# Patient Record
Sex: FEMALE | Race: BLACK OR AFRICAN AMERICAN | NOT HISPANIC OR LATINO
[De-identification: names, ages, dates, MRNs, and addresses within clinical notes are randomized per-mention and may not be internally consistent; named-entity substitution may affect disease eponyms.]

---

## 2017-01-28 ENCOUNTER — APPOINTMENT (OUTPATIENT)
Dept: FAMILY MEDICINE | Facility: CLINIC | Age: 57
End: 2017-01-28

## 2017-01-28 VITALS
DIASTOLIC BLOOD PRESSURE: 72 MMHG | WEIGHT: 150 LBS | OXYGEN SATURATION: 92 % | HEIGHT: 65 IN | HEART RATE: 81 BPM | SYSTOLIC BLOOD PRESSURE: 118 MMHG | BODY MASS INDEX: 24.99 KG/M2 | RESPIRATION RATE: 13 BRPM

## 2017-01-30 LAB
25(OH)D3 SERPL-MCNC: 18 NG/ML
ALBUMIN SERPL ELPH-MCNC: 4.1 G/DL
ALP BLD-CCNC: 51 U/L
ALT SERPL-CCNC: 7 U/L
ANION GAP SERPL CALC-SCNC: 16 MMOL/L
APPEARANCE: CLEAR
AST SERPL-CCNC: 12 U/L
BASOPHILS # BLD AUTO: 0.01 K/UL
BASOPHILS NFR BLD AUTO: 0.2 %
BILIRUB SERPL-MCNC: 0.3 MG/DL
BILIRUBIN URINE: NEGATIVE
BLOOD URINE: NEGATIVE
BUN SERPL-MCNC: 21 MG/DL
C PEPTIDE SERPL-MCNC: 1.7 NG/ML
CALCIUM SERPL-MCNC: 9.7 MG/DL
CHLORIDE SERPL-SCNC: 99 MMOL/L
CHOLEST SERPL-MCNC: 181 MG/DL
CHOLEST/HDLC SERPL: 2.6 RATIO
CO2 SERPL-SCNC: 24 MMOL/L
COLOR: YELLOW
CREAT SERPL-MCNC: 1.16 MG/DL
EOSINOPHIL # BLD AUTO: 0.05 K/UL
EOSINOPHIL NFR BLD AUTO: 1.2 %
FERRITIN SERPL-MCNC: 57.7 NG/ML
FOLATE SERPL-MCNC: 15.7 NG/ML
GLUCOSE QUALITATIVE U: NORMAL MG/DL
GLUCOSE SERPL-MCNC: 78 MG/DL
HBA1C MFR BLD HPLC: 5.7 %
HCT VFR BLD CALC: 37.5 %
HDLC SERPL-MCNC: 69 MG/DL
HGB BLD-MCNC: 12.5 G/DL
IMM GRANULOCYTES NFR BLD AUTO: 0.2 %
IRON SATN MFR SERPL: 14 %
IRON SERPL-MCNC: 43 UG/DL
KETONES URINE: NEGATIVE
LDLC SERPL CALC-MCNC: 100 MG/DL
LEUKOCYTE ESTERASE URINE: NEGATIVE
LYMPHOCYTES # BLD AUTO: 1.41 K/UL
LYMPHOCYTES NFR BLD AUTO: 32.8 %
MAN DIFF?: NORMAL
MCHC RBC-ENTMCNC: 28.6 PG
MCHC RBC-ENTMCNC: 33.3 GM/DL
MCV RBC AUTO: 85.8 FL
MONOCYTES # BLD AUTO: 0.41 K/UL
MONOCYTES NFR BLD AUTO: 9.5 %
NEUTROPHILS # BLD AUTO: 2.41 K/UL
NEUTROPHILS NFR BLD AUTO: 56.1 %
NITRITE URINE: NEGATIVE
PH URINE: 5.5
PLATELET # BLD AUTO: 252 K/UL
POTASSIUM SERPL-SCNC: 4.2 MMOL/L
PROT SERPL-MCNC: 8.4 G/DL
PROTEIN URINE: NEGATIVE MG/DL
RBC # BLD: 4.37 M/UL
RBC # FLD: 15.5 %
SODIUM SERPL-SCNC: 139 MMOL/L
SPECIFIC GRAVITY URINE: 1.01
TIBC SERPL-MCNC: 317 UG/DL
TRIGL SERPL-MCNC: 62 MG/DL
UIBC SERPL-MCNC: 274 UG/DL
UROBILINOGEN URINE: NORMAL MG/DL
VIT B12 SERPL-MCNC: 598 PG/ML
WBC # FLD AUTO: 4.3 K/UL

## 2017-02-11 ENCOUNTER — APPOINTMENT (OUTPATIENT)
Dept: FAMILY MEDICINE | Facility: CLINIC | Age: 57
End: 2017-02-11

## 2017-02-11 VITALS
SYSTOLIC BLOOD PRESSURE: 118 MMHG | BODY MASS INDEX: 24.99 KG/M2 | WEIGHT: 150 LBS | OXYGEN SATURATION: 95 % | RESPIRATION RATE: 12 BRPM | HEART RATE: 83 BPM | DIASTOLIC BLOOD PRESSURE: 70 MMHG | TEMPERATURE: 98.6 F | HEIGHT: 65 IN

## 2018-05-31 ENCOUNTER — APPOINTMENT (OUTPATIENT)
Dept: FAMILY MEDICINE | Facility: CLINIC | Age: 58
End: 2018-05-31
Payer: COMMERCIAL

## 2018-05-31 VITALS
DIASTOLIC BLOOD PRESSURE: 74 MMHG | RESPIRATION RATE: 12 BRPM | BODY MASS INDEX: 24.99 KG/M2 | WEIGHT: 150 LBS | HEART RATE: 70 BPM | SYSTOLIC BLOOD PRESSURE: 128 MMHG | OXYGEN SATURATION: 96 % | HEIGHT: 65 IN

## 2018-05-31 PROCEDURE — 99214 OFFICE O/P EST MOD 30 MIN: CPT

## 2018-05-31 RX ORDER — DULOXETINE HYDROCHLORIDE 60 MG/1
60 CAPSULE, DELAYED RELEASE ORAL
Refills: 0 | Status: COMPLETED | COMMUNITY
End: 2018-05-31

## 2018-05-31 NOTE — REVIEW OF SYSTEMS
[Patient Intake Form Reviewed] : Patient intake form was reviewed [Negative] : Heme/Lymph [FreeTextEntry5] : HTN, HLD [de-identified] : Bipolar Disorder [FreeTextEntry1] : Low Vit. D, DM

## 2018-05-31 NOTE — ASSESSMENT
[FreeTextEntry1] : HTN - Controlled with Meds.\par Overweight/DM - Diet and Exercise Discussed with Pt.\par Low Vit D - Vit D.\par Bipolar Disorder - Mgt with Psychiatrist\par GERD - Controlled\par Fatigue - Increased Rest \par Low Iron - OTC Iron Supplement.\par \par Full Labs Next Visit.\par

## 2018-05-31 NOTE — PHYSICAL EXAM

## 2018-05-31 NOTE — HISTORY OF PRESENT ILLNESS
[FreeTextEntry1] : F/U Apt. to Check BP and Renew Meds [de-identified] : F/U Apt. to Check BP and Renew Meds

## 2018-05-31 NOTE — CURRENT MEDS
[Takes medication as prescribed] : takes [Cost] : cost [Lack of understanding] : lack of understanding

## 2018-06-15 ENCOUNTER — APPOINTMENT (OUTPATIENT)
Dept: FAMILY MEDICINE | Facility: CLINIC | Age: 58
End: 2018-06-15
Payer: COMMERCIAL

## 2018-06-15 ENCOUNTER — LABORATORY RESULT (OUTPATIENT)
Age: 58
End: 2018-06-15

## 2018-06-15 VITALS
TEMPERATURE: 98.8 F | OXYGEN SATURATION: 96 % | WEIGHT: 150 LBS | SYSTOLIC BLOOD PRESSURE: 130 MMHG | DIASTOLIC BLOOD PRESSURE: 76 MMHG | RESPIRATION RATE: 13 BRPM | BODY MASS INDEX: 24.99 KG/M2 | HEIGHT: 65 IN | HEART RATE: 80 BPM

## 2018-06-15 PROCEDURE — 93000 ELECTROCARDIOGRAM COMPLETE: CPT | Mod: 59

## 2018-06-15 PROCEDURE — 99396 PREV VISIT EST AGE 40-64: CPT | Mod: 25

## 2018-06-15 PROCEDURE — 36415 COLL VENOUS BLD VENIPUNCTURE: CPT

## 2018-06-15 NOTE — HEALTH RISK ASSESSMENT
[Good] : ~his/her~  mood as  good [No falls in past year] : Patient reported no falls in the past year [0] : 2) Feeling down, depressed, or hopeless: Not at all (0) [Patient reported mammogram was normal] : Patient reported mammogram was normal [Patient reported PAP Smear was normal] : Patient reported PAP Smear was normal [Patient reported bone density results were normal] : Patient reported bone density results were normal [Patient reported colonoscopy was normal] : Patient reported colonoscopy was normal [HIV Test offered] : HIV Test offered [Hepatitis C test offered] : Hepatitis C test offered [None] : None [With Significant Other] : lives with significant other [# of Members in Household ___] :  household currently consist of [unfilled] member(s) [Employed] : employed [College] : College [] :  [# Of Children ___] : has [unfilled] children [Sexually Active] : sexually active [Feels Safe at Home] : Feels safe at home [Fully functional (bathing, dressing, toileting, transferring, walking, feeding)] : Fully functional (bathing, dressing, toileting, transferring, walking, feeding) [Fully functional (using the telephone, shopping, preparing meals, housekeeping, doing laundry, using] : Fully functional and needs no help or supervision to perform IADLs (using the telephone, shopping, preparing meals, housekeeping, doing laundry, using transportation, managing medications and managing finances) [Smoke Detector] : smoke detector [Carbon Monoxide Detector] : carbon monoxide detector [Safety elements used in home] : safety elements used in home [Seat Belt] :  uses seat belt [Sunscreen] : uses sunscreen [Discussed at today's visit] : Advance Directives Discussed at today's visit [No changes since last discussed ___] : No changes since last discussed  [unfilled] [] : No [Change in mental status noted] : No change in mental status noted [Language] : denies difficulty with language [Behavior] : denies difficulty with behavior [Learning/Retaining New Information] : denies difficulty learning/retaining new information [Handling Complex Tasks] : denies difficulty handling complex tasks [Reasoning] : denies difficulty with reasoning [Spatial Ability and Orientation] : denies difficulty with spatial ability and orientation [High Risk Behavior] : no high risk behavior [Reports changes in hearing] : Reports no changes in hearing [Reports changes in vision] : Reports no changes in vision [Reports changes in dental health] : Reports no changes in dental health [Guns at Home] : no guns at home [Travel to Developing Areas] : does not  travel to developing areas [TB Exposure] : is not being exposed to tuberculosis [Caregiver Concerns] : does not have caregiver concerns [MammogramDate] : 09/17 [PapSmearDate] : 09/17 [BoneDensityDate] : 09/17 [ColonoscopyDate] : 10/15 [HIVDate] : 06/17 [HepatitisCDate] : 06/17 [FreeTextEntry2] : LENNY

## 2018-06-15 NOTE — PHYSICAL EXAM

## 2018-06-15 NOTE — REVIEW OF SYSTEMS
[Patient Intake Form Reviewed] : Patient intake form was reviewed [Negative] : Heme/Lymph [FreeTextEntry5] : HTN, HLD [de-identified] : Bipolar Disorder [FreeTextEntry1] : Low Vit. D, DM

## 2018-06-16 ENCOUNTER — APPOINTMENT (OUTPATIENT)
Dept: FAMILY MEDICINE | Facility: CLINIC | Age: 58
End: 2018-06-16

## 2018-06-16 LAB
25(OH)D3 SERPL-MCNC: 33.5 NG/ML
ALBUMIN SERPL ELPH-MCNC: 4.1 G/DL
ALP BLD-CCNC: 60 U/L
ALT SERPL-CCNC: 13 U/L
ANION GAP SERPL CALC-SCNC: 17 MMOL/L
APPEARANCE: CLEAR
AST SERPL-CCNC: 16 U/L
BASOPHILS # BLD AUTO: 0.01 K/UL
BASOPHILS NFR BLD AUTO: 0.3 %
BILIRUB SERPL-MCNC: 0.3 MG/DL
BILIRUBIN URINE: NEGATIVE
BLOOD URINE: NEGATIVE
BUN SERPL-MCNC: 16 MG/DL
CALCIUM SERPL-MCNC: 9.5 MG/DL
CHLORIDE SERPL-SCNC: 101 MMOL/L
CHOLEST SERPL-MCNC: 196 MG/DL
CHOLEST/HDLC SERPL: 3 RATIO
CO2 SERPL-SCNC: 24 MMOL/L
COLOR: ABNORMAL
CREAT SERPL-MCNC: 1.4 MG/DL
EOSINOPHIL # BLD AUTO: 0.05 K/UL
EOSINOPHIL NFR BLD AUTO: 1.4 %
FERRITIN SERPL-MCNC: 107 NG/ML
FOLATE SERPL-MCNC: 15.4 NG/ML
GLUCOSE QUALITATIVE U: NEGATIVE MG/DL
GLUCOSE SERPL-MCNC: 100 MG/DL
HBA1C MFR BLD HPLC: 5.4 %
HCT VFR BLD CALC: 44.6 %
HCV AB SER QL: NONREACTIVE
HCV S/CO RATIO: 0.14 S/CO
HDLC SERPL-MCNC: 66 MG/DL
HGB BLD-MCNC: 15.3 G/DL
HIV1+2 AB SPEC QL IA.RAPID: NONREACTIVE
IMM GRANULOCYTES NFR BLD AUTO: 0.3 %
IRON SATN MFR SERPL: 17 %
IRON SERPL-MCNC: 37 UG/DL
KETONES URINE: NEGATIVE
LDLC SERPL CALC-MCNC: 114 MG/DL
LEUKOCYTE ESTERASE URINE: ABNORMAL
LYMPHOCYTES # BLD AUTO: 1.23 K/UL
LYMPHOCYTES NFR BLD AUTO: 33.9 %
MAN DIFF?: NORMAL
MCHC RBC-ENTMCNC: 29.9 PG
MCHC RBC-ENTMCNC: 34.3 GM/DL
MCV RBC AUTO: 87.3 FL
MONOCYTES # BLD AUTO: 0.57 K/UL
MONOCYTES NFR BLD AUTO: 15.7 %
NEUTROPHILS # BLD AUTO: 1.76 K/UL
NEUTROPHILS NFR BLD AUTO: 48.4 %
NITRITE URINE: NEGATIVE
PH URINE: 5.5
PLATELET # BLD AUTO: 179 K/UL
POTASSIUM SERPL-SCNC: 4.4 MMOL/L
PROT SERPL-MCNC: 8 G/DL
PROTEIN URINE: ABNORMAL MG/DL
RBC # BLD: 5.11 M/UL
RBC # FLD: 15.7 %
SODIUM SERPL-SCNC: 142 MMOL/L
SPECIFIC GRAVITY URINE: 1.02
T4 SERPL-MCNC: 10.4 UG/DL
TIBC SERPL-MCNC: 224 UG/DL
TRIGL SERPL-MCNC: 81 MG/DL
TSH SERPL-ACNC: 1.22 UIU/ML
UIBC SERPL-MCNC: 187 UG/DL
UROBILINOGEN URINE: NEGATIVE MG/DL
VIT B12 SERPL-MCNC: 686 PG/ML
WBC # FLD AUTO: 3.63 K/UL

## 2018-06-18 LAB — C PEPTIDE SERPL-MCNC: 7.3 NG/ML

## 2018-06-30 ENCOUNTER — APPOINTMENT (OUTPATIENT)
Dept: FAMILY MEDICINE | Facility: CLINIC | Age: 58
End: 2018-06-30
Payer: COMMERCIAL

## 2018-06-30 VITALS
WEIGHT: 160 LBS | HEIGHT: 65 IN | SYSTOLIC BLOOD PRESSURE: 110 MMHG | OXYGEN SATURATION: 99 % | RESPIRATION RATE: 13 BRPM | DIASTOLIC BLOOD PRESSURE: 70 MMHG | BODY MASS INDEX: 26.66 KG/M2 | HEART RATE: 67 BPM | TEMPERATURE: 98.2 F

## 2018-06-30 PROCEDURE — 99214 OFFICE O/P EST MOD 30 MIN: CPT

## 2018-06-30 NOTE — ASSESSMENT
[FreeTextEntry1] : Overweight/Fatigue/DM - Diet and Exercise.\par Low Vit. D - Vit. D.\par HLD - Controlled with Meds.\par HTN - Controlled with Meds.\par HCV Screening - Negative.\par HIV Screening - Negative.\par GERD - Controlled with Meds.\par Low Iron - Iron Supplement. \par \par F/U 3 Months to Repeat Lab Testing.\par \par \par

## 2018-06-30 NOTE — COUNSELING
[Weight management counseling provided] : Weight management [Healthy eating counseling provided] : healthy eating [Activity counseling provided] : activity [Smoking cessation counseling provided] : smoking cessation [Behavioral health counseling provided] : behavioral health  [None] : None [Good understanding] : Patient has a good understanding of lifestyle changes and the steps needed to achieve self management goals

## 2018-06-30 NOTE — PHYSICAL EXAM

## 2018-07-28 ENCOUNTER — APPOINTMENT (OUTPATIENT)
Dept: FAMILY MEDICINE | Facility: CLINIC | Age: 58
End: 2018-07-28

## 2018-08-11 ENCOUNTER — APPOINTMENT (OUTPATIENT)
Dept: FAMILY MEDICINE | Facility: CLINIC | Age: 58
End: 2018-08-11

## 2018-09-22 ENCOUNTER — LABORATORY RESULT (OUTPATIENT)
Age: 58
End: 2018-09-22

## 2018-09-22 ENCOUNTER — APPOINTMENT (OUTPATIENT)
Dept: FAMILY MEDICINE | Facility: CLINIC | Age: 58
End: 2018-09-22
Payer: COMMERCIAL

## 2018-09-22 VITALS
RESPIRATION RATE: 13 BRPM | DIASTOLIC BLOOD PRESSURE: 60 MMHG | WEIGHT: 160 LBS | SYSTOLIC BLOOD PRESSURE: 100 MMHG | TEMPERATURE: 98.5 F | BODY MASS INDEX: 26.66 KG/M2 | HEART RATE: 69 BPM | HEIGHT: 65 IN | OXYGEN SATURATION: 98 %

## 2018-09-22 PROCEDURE — G0008: CPT | Mod: 59

## 2018-09-22 PROCEDURE — 90686 IIV4 VACC NO PRSV 0.5 ML IM: CPT

## 2018-09-22 PROCEDURE — 36415 COLL VENOUS BLD VENIPUNCTURE: CPT

## 2018-09-22 PROCEDURE — 99214 OFFICE O/P EST MOD 30 MIN: CPT | Mod: 25

## 2018-09-24 LAB
25(OH)D3 SERPL-MCNC: 28.1 NG/ML
ALBUMIN SERPL ELPH-MCNC: 4.1 G/DL
ALP BLD-CCNC: 59 U/L
ALT SERPL-CCNC: 9 U/L
ANION GAP SERPL CALC-SCNC: 12 MMOL/L
APPEARANCE: CLEAR
AST SERPL-CCNC: 11 U/L
BASOPHILS # BLD AUTO: 0.01 K/UL
BASOPHILS NFR BLD AUTO: 0.2 %
BILIRUB SERPL-MCNC: 0.2 MG/DL
BILIRUBIN URINE: NEGATIVE
BLOOD URINE: NEGATIVE
BUN SERPL-MCNC: 18 MG/DL
C PEPTIDE SERPL-MCNC: 9.2 NG/ML
CALCIUM SERPL-MCNC: 9.4 MG/DL
CHLORIDE SERPL-SCNC: 103 MMOL/L
CHOLEST SERPL-MCNC: 184 MG/DL
CHOLEST/HDLC SERPL: 3.4 RATIO
CO2 SERPL-SCNC: 27 MMOL/L
COLOR: YELLOW
CREAT SERPL-MCNC: 1.32 MG/DL
CREAT SPEC-SCNC: 158 MG/DL
EOSINOPHIL # BLD AUTO: 0.05 K/UL
EOSINOPHIL NFR BLD AUTO: 1.2 %
FERRITIN SERPL-MCNC: 144 NG/ML
FOLATE SERPL-MCNC: 13.3 NG/ML
GLUCOSE QUALITATIVE U: NEGATIVE MG/DL
GLUCOSE SERPL-MCNC: 83 MG/DL
HBA1C MFR BLD HPLC: 5.2 %
HCT VFR BLD CALC: 40.5 %
HDLC SERPL-MCNC: 54 MG/DL
HGB BLD-MCNC: 14.1 G/DL
IMM GRANULOCYTES NFR BLD AUTO: 0.2 %
IRON SATN MFR SERPL: 20 %
IRON SERPL-MCNC: 55 UG/DL
KETONES URINE: NEGATIVE
LDLC SERPL CALC-MCNC: 113 MG/DL
LEUKOCYTE ESTERASE URINE: ABNORMAL
LYMPHOCYTES # BLD AUTO: 1.32 K/UL
LYMPHOCYTES NFR BLD AUTO: 32.5 %
MAN DIFF?: NORMAL
MCHC RBC-ENTMCNC: 31.1 PG
MCHC RBC-ENTMCNC: 34.8 GM/DL
MCV RBC AUTO: 89.4 FL
MICROALBUMIN 24H UR DL<=1MG/L-MCNC: 4.5 MG/DL
MICROALBUMIN/CREAT 24H UR-RTO: 29 MG/G
MONOCYTES # BLD AUTO: 0.45 K/UL
MONOCYTES NFR BLD AUTO: 11.1 %
NEUTROPHILS # BLD AUTO: 2.22 K/UL
NEUTROPHILS NFR BLD AUTO: 54.8 %
NITRITE URINE: NEGATIVE
PH URINE: 6
PLATELET # BLD AUTO: 221 K/UL
POTASSIUM SERPL-SCNC: 4.4 MMOL/L
PROT SERPL-MCNC: 7.6 G/DL
PROTEIN URINE: NEGATIVE MG/DL
RBC # BLD: 4.53 M/UL
RBC # FLD: 16.4 %
SODIUM SERPL-SCNC: 142 MMOL/L
SPECIFIC GRAVITY URINE: 1.02
T4 SERPL-MCNC: 9.8 UG/DL
TIBC SERPL-MCNC: 271 UG/DL
TRIGL SERPL-MCNC: 87 MG/DL
TSH SERPL-ACNC: 1.2 UIU/ML
UIBC SERPL-MCNC: 216 UG/DL
UROBILINOGEN URINE: NEGATIVE MG/DL
VIT B12 SERPL-MCNC: 877 PG/ML
WBC # FLD AUTO: 4.06 K/UL

## 2018-10-20 ENCOUNTER — APPOINTMENT (OUTPATIENT)
Dept: FAMILY MEDICINE | Facility: CLINIC | Age: 58
End: 2018-10-20

## 2018-12-15 ENCOUNTER — APPOINTMENT (OUTPATIENT)
Dept: FAMILY MEDICINE | Facility: CLINIC | Age: 58
End: 2018-12-15
Payer: COMMERCIAL

## 2018-12-15 VITALS
DIASTOLIC BLOOD PRESSURE: 78 MMHG | HEART RATE: 62 BPM | WEIGHT: 160 LBS | RESPIRATION RATE: 13 BRPM | BODY MASS INDEX: 26.66 KG/M2 | SYSTOLIC BLOOD PRESSURE: 120 MMHG | OXYGEN SATURATION: 99 % | HEIGHT: 65 IN

## 2018-12-15 PROCEDURE — 99214 OFFICE O/P EST MOD 30 MIN: CPT

## 2018-12-15 RX ORDER — IBUPROFEN 600 MG/1
600 TABLET, FILM COATED ORAL
Qty: 12 | Refills: 0 | Status: COMPLETED | COMMUNITY
Start: 2018-12-06

## 2018-12-15 RX ORDER — AMOXICILLIN 875 MG/1
875 TABLET, FILM COATED ORAL
Qty: 14 | Refills: 0 | Status: COMPLETED | COMMUNITY
Start: 2018-12-06

## 2019-03-01 ENCOUNTER — APPOINTMENT (OUTPATIENT)
Dept: NEPHROLOGY | Facility: CLINIC | Age: 59
End: 2019-03-01
Payer: COMMERCIAL

## 2019-03-01 VITALS
HEIGHT: 65 IN | BODY MASS INDEX: 27.99 KG/M2 | WEIGHT: 168 LBS | SYSTOLIC BLOOD PRESSURE: 126 MMHG | DIASTOLIC BLOOD PRESSURE: 70 MMHG

## 2019-03-01 PROCEDURE — 36415 COLL VENOUS BLD VENIPUNCTURE: CPT

## 2019-03-01 PROCEDURE — 99215 OFFICE O/P EST HI 40 MIN: CPT | Mod: 25

## 2019-03-01 RX ORDER — ENALAPRIL MALEATE 5 MG/1
5 TABLET ORAL DAILY
Qty: 90 | Refills: 2 | Status: DISCONTINUED | COMMUNITY
Start: 2018-05-31 | End: 2019-03-01

## 2019-03-01 RX ORDER — VARENICLINE TARTRATE 0.5 (11)-1
0.5 MG X 11 & KIT ORAL
Qty: 1 | Refills: 1 | Status: DISCONTINUED | COMMUNITY
Start: 2018-06-30 | End: 2019-03-01

## 2019-03-01 RX ORDER — TRAVOPROST 0.04 MG/ML
0 SOLUTION/ DROPS OPHTHALMIC
Refills: 0 | Status: DISCONTINUED | COMMUNITY
End: 2019-03-01

## 2019-03-01 RX ORDER — BENZTROPINE MESYLATE 0.5 MG/1
0.5 TABLET ORAL
Refills: 0 | Status: DISCONTINUED | COMMUNITY
End: 2019-03-01

## 2019-03-01 RX ORDER — FLUOCINONIDE 1 MG/G
0.1 CREAM TOPICAL TWICE DAILY
Qty: 1 | Refills: 3 | Status: DISCONTINUED | COMMUNITY
Start: 2018-10-19 | End: 2019-03-01

## 2019-03-01 RX ORDER — DIVALPROEX SODIUM 250 MG/1
250 TABLET, EXTENDED RELEASE ORAL
Refills: 0 | Status: DISCONTINUED | COMMUNITY
End: 2019-03-01

## 2019-03-01 RX ORDER — TRAVOPROST 0.04 MG/ML
0 SOLUTION/ DROPS OPHTHALMIC
Qty: 3 | Refills: 0 | Status: DISCONTINUED | COMMUNITY
Start: 2018-10-22 | End: 2019-03-01

## 2019-03-01 RX ORDER — CHLORHEXIDINE GLUCONATE, 0.12% ORAL RINSE 1.2 MG/ML
0.12 SOLUTION DENTAL
Qty: 473 | Refills: 0 | Status: DISCONTINUED | COMMUNITY
Start: 2018-12-06 | End: 2019-03-01

## 2019-03-01 RX ORDER — VARENICLINE TARTRATE 1 MG/1
1 TABLET, FILM COATED ORAL
Qty: 1 | Refills: 3 | Status: DISCONTINUED | COMMUNITY
Start: 2018-06-30 | End: 2019-03-01

## 2019-03-01 NOTE — HISTORY OF PRESENT ILLNESS
[FreeTextEntry1] : Here for CKD Evaluation,\par \par On Haldol Injection Q month,\par \par + HTN, Non Diabetic,

## 2019-03-01 NOTE — ASSESSMENT
[FreeTextEntry1] : A : CKD - 3 , ? LILIA,\par \par HTN,\par \par \par P : D.C ACEi,\par \par Add LYNNE LIANGU

## 2019-03-04 ENCOUNTER — APPOINTMENT (OUTPATIENT)
Dept: FAMILY MEDICINE | Facility: CLINIC | Age: 59
End: 2019-03-04

## 2019-03-04 LAB
25(OH)D3 SERPL-MCNC: 46.2 NG/ML
ALBUMIN SERPL ELPH-MCNC: 3.8 G/DL
ANION GAP SERPL CALC-SCNC: 9 MMOL/L
APPEARANCE: CLEAR
BACTERIA: NEGATIVE
BASOPHILS # BLD AUTO: 0.01 K/UL
BASOPHILS NFR BLD AUTO: 0.2 %
BILIRUBIN URINE: NEGATIVE
BLOOD URINE: NEGATIVE
BUN SERPL-MCNC: 24 MG/DL
CALCIUM SERPL-MCNC: 9.4 MG/DL
CALCIUM SERPL-MCNC: 9.4 MG/DL
CHLORIDE SERPL-SCNC: 105 MMOL/L
CO2 SERPL-SCNC: 29 MMOL/L
COLOR: YELLOW
CREAT SERPL-MCNC: 1.31 MG/DL
CREAT SPEC-SCNC: 164 MG/DL
CREAT/PROT UR: 0.2 RATIO
EOSINOPHIL # BLD AUTO: 0.07 K/UL
EOSINOPHIL NFR BLD AUTO: 1.7 %
GLUCOSE QUALITATIVE U: NEGATIVE
GLUCOSE SERPL-MCNC: 82 MG/DL
HCT VFR BLD CALC: 43.7 %
HGB BLD-MCNC: 14.5 G/DL
HYALINE CASTS: 0 /LPF
IMM GRANULOCYTES NFR BLD AUTO: 0.5 %
KETONES URINE: NEGATIVE
LEUKOCYTE ESTERASE URINE: NEGATIVE
LYMPHOCYTES # BLD AUTO: 1.56 K/UL
LYMPHOCYTES NFR BLD AUTO: 37.6 %
MAN DIFF?: NORMAL
MCHC RBC-ENTMCNC: 30.3 PG
MCHC RBC-ENTMCNC: 33.2 GM/DL
MCV RBC AUTO: 91.2 FL
MICROSCOPIC-UA: NORMAL
MONOCYTES # BLD AUTO: 0.47 K/UL
MONOCYTES NFR BLD AUTO: 11.3 %
NEUTROPHILS # BLD AUTO: 2.02 K/UL
NEUTROPHILS NFR BLD AUTO: 48.7 %
NITRITE URINE: NEGATIVE
PARATHYROID HORMONE INTACT: 58 PG/ML
PH URINE: 6.5
PHOSPHATE SERPL-MCNC: 3.4 MG/DL
PLATELET # BLD AUTO: 226 K/UL
POTASSIUM SERPL-SCNC: 6.1 MMOL/L
PROT UR-MCNC: 29 MG/DL
PROTEIN URINE: NORMAL
RBC # BLD: 4.79 M/UL
RBC # FLD: 14.6 %
RED BLOOD CELLS URINE: 1 /HPF
SODIUM SERPL-SCNC: 143 MMOL/L
SPECIFIC GRAVITY URINE: 1.02
SQUAMOUS EPITHELIAL CELLS: 3 /HPF
UROBILINOGEN URINE: NORMAL
WBC # FLD AUTO: 4.15 K/UL
WHITE BLOOD CELLS URINE: 1 /HPF

## 2019-06-26 ENCOUNTER — APPOINTMENT (OUTPATIENT)
Dept: OBGYN | Facility: CLINIC | Age: 59
End: 2019-06-26
Payer: COMMERCIAL

## 2019-06-26 VITALS
WEIGHT: 160 LBS | SYSTOLIC BLOOD PRESSURE: 120 MMHG | DIASTOLIC BLOOD PRESSURE: 80 MMHG | HEIGHT: 65 IN | BODY MASS INDEX: 26.66 KG/M2

## 2019-06-26 PROCEDURE — 99213 OFFICE O/P EST LOW 20 MIN: CPT

## 2019-06-26 NOTE — PHYSICAL EXAM
[Moderate] : moderate [Normal] : uterus [Abdoul] : yellow [Discharge] : a  ~M vaginal discharge was present [Thick] : thick [Uterine Adnexae] : were not tender and not enlarged [No Bleeding] : there was no active vaginal bleeding

## 2019-06-26 NOTE — PHYSICAL EXAM
[Moderate] : moderate [Normal] : cervix [Thick] : thick [Abdoul] : yellow [Discharge] : a  ~M vaginal discharge was present [Uterine Adnexae] : were not tender and not enlarged [No Bleeding] : there was no active vaginal bleeding

## 2019-06-30 ENCOUNTER — LABORATORY RESULT (OUTPATIENT)
Age: 59
End: 2019-06-30

## 2019-07-01 ENCOUNTER — APPOINTMENT (OUTPATIENT)
Dept: OBGYN | Facility: CLINIC | Age: 59
End: 2019-07-01
Payer: COMMERCIAL

## 2019-07-01 VITALS
HEIGHT: 65 IN | BODY MASS INDEX: 26.66 KG/M2 | DIASTOLIC BLOOD PRESSURE: 84 MMHG | WEIGHT: 160 LBS | SYSTOLIC BLOOD PRESSURE: 120 MMHG

## 2019-07-01 DIAGNOSIS — Z01.419 ENCOUNTER FOR GYNECOLOGICAL EXAMINATION (GENERAL) (ROUTINE) W/OUT ABNORMAL FINDINGS: ICD-10-CM

## 2019-07-01 DIAGNOSIS — Z87.19 PERSONAL HISTORY OF OTHER DISEASES OF THE DIGESTIVE SYSTEM: ICD-10-CM

## 2019-07-01 PROCEDURE — 99396 PREV VISIT EST AGE 40-64: CPT

## 2019-07-01 NOTE — COUNSELING
[Exercise] : exercise [Vitamins/Supplements] : vitamins/supplements [Smoking Cessation] : smoking cessation

## 2019-07-01 NOTE — PHYSICAL EXAM
[Awake] : awake [Alert] : alert [Acute Distress] : no acute distress [Mass] : no breast mass [Nipple Discharge] : no nipple discharge [Axillary LAD] : no axillary lymphadenopathy [Soft] : soft [Tender] : non tender [Oriented x3] : oriented to person, place, and time [Normal] : uterus [Discharge] : a  ~M vaginal discharge was present [No Bleeding] : there was no active vaginal bleeding [Uterine Adnexae] : were not tender and not enlarged [No Tenderness] : no rectal tenderness [Nl Sphincter Tone] : normal sphincter tone

## 2019-07-01 NOTE — HISTORY OF PRESENT ILLNESS
[Discharge] : discharge [Regular Cycle Intervals] : periods have been regular [Frequency: Q ___ days] : menstrual periods occur approximately every [unfilled] days [Menarche Age: ____] : age at menarche was [unfilled] [Menopause Age: ____] : age at menopause was [unfilled]

## 2019-07-05 LAB
CYTOLOGY CVX/VAG DOC THIN PREP: NORMAL
HPV HIGH+LOW RISK DNA PNL CVX: NOT DETECTED

## 2019-07-08 DIAGNOSIS — N76.0 ACUTE VAGINITIS: ICD-10-CM

## 2019-07-08 DIAGNOSIS — B37.3 CANDIDIASIS OF VULVA AND VAGINA: ICD-10-CM

## 2019-07-08 DIAGNOSIS — B96.89 ACUTE VAGINITIS: ICD-10-CM

## 2019-07-16 ENCOUNTER — APPOINTMENT (OUTPATIENT)
Dept: OBGYN | Facility: CLINIC | Age: 59
End: 2019-07-16

## 2019-08-01 ENCOUNTER — APPOINTMENT (OUTPATIENT)
Dept: FAMILY MEDICINE | Facility: CLINIC | Age: 59
End: 2019-08-01

## 2019-08-06 ENCOUNTER — APPOINTMENT (OUTPATIENT)
Dept: FAMILY MEDICINE | Facility: CLINIC | Age: 59
End: 2019-08-06
Payer: COMMERCIAL

## 2019-08-06 VITALS
WEIGHT: 160 LBS | SYSTOLIC BLOOD PRESSURE: 122 MMHG | HEIGHT: 65 IN | BODY MASS INDEX: 26.66 KG/M2 | HEART RATE: 61 BPM | OXYGEN SATURATION: 98 % | DIASTOLIC BLOOD PRESSURE: 76 MMHG | RESPIRATION RATE: 13 BRPM

## 2019-08-06 DIAGNOSIS — Z71.6 TOBACCO ABUSE COUNSELING: ICD-10-CM

## 2019-08-06 PROCEDURE — 99406 BEHAV CHNG SMOKING 3-10 MIN: CPT

## 2019-08-06 PROCEDURE — 99214 OFFICE O/P EST MOD 30 MIN: CPT

## 2019-08-07 ENCOUNTER — RX RENEWAL (OUTPATIENT)
Age: 59
End: 2019-08-07

## 2019-09-06 ENCOUNTER — APPOINTMENT (OUTPATIENT)
Dept: FAMILY MEDICINE | Facility: CLINIC | Age: 59
End: 2019-09-06
Payer: COMMERCIAL

## 2019-09-06 ENCOUNTER — NON-APPOINTMENT (OUTPATIENT)
Age: 59
End: 2019-09-06

## 2019-09-06 VITALS
SYSTOLIC BLOOD PRESSURE: 136 MMHG | HEIGHT: 65 IN | HEART RATE: 45 BPM | RESPIRATION RATE: 12 BRPM | WEIGHT: 160 LBS | OXYGEN SATURATION: 97 % | TEMPERATURE: 98 F | DIASTOLIC BLOOD PRESSURE: 80 MMHG | BODY MASS INDEX: 26.66 KG/M2

## 2019-09-06 DIAGNOSIS — F17.200 NICOTINE DEPENDENCE, UNSPECIFIED, UNCOMPLICATED: ICD-10-CM

## 2019-09-06 PROCEDURE — 99214 OFFICE O/P EST MOD 30 MIN: CPT

## 2019-10-21 ENCOUNTER — APPOINTMENT (OUTPATIENT)
Dept: FAMILY MEDICINE | Facility: CLINIC | Age: 59
End: 2019-10-21
Payer: COMMERCIAL

## 2019-10-21 VITALS
DIASTOLIC BLOOD PRESSURE: 74 MMHG | HEIGHT: 65 IN | OXYGEN SATURATION: 98 % | RESPIRATION RATE: 13 BRPM | WEIGHT: 160 LBS | HEART RATE: 66 BPM | BODY MASS INDEX: 26.66 KG/M2 | SYSTOLIC BLOOD PRESSURE: 130 MMHG

## 2019-10-21 DIAGNOSIS — Z92.29 PERSONAL HISTORY OF OTHER DRUG THERAPY: ICD-10-CM

## 2019-10-21 PROCEDURE — G0008: CPT

## 2019-10-21 PROCEDURE — 90686 IIV4 VACC NO PRSV 0.5 ML IM: CPT

## 2019-10-21 PROCEDURE — 99214 OFFICE O/P EST MOD 30 MIN: CPT | Mod: 25

## 2019-11-08 LAB — HEMOCCULT STL QL IA: NEGATIVE

## 2019-12-13 ENCOUNTER — RESULT REVIEW (OUTPATIENT)
Age: 59
End: 2019-12-13

## 2020-01-20 ENCOUNTER — APPOINTMENT (OUTPATIENT)
Dept: FAMILY MEDICINE | Facility: CLINIC | Age: 60
End: 2020-01-20

## 2020-03-13 ENCOUNTER — APPOINTMENT (OUTPATIENT)
Dept: SURGERY | Facility: CLINIC | Age: 60
End: 2020-03-13

## 2020-05-18 ENCOUNTER — RX CHANGE (OUTPATIENT)
Age: 60
End: 2020-05-18

## 2020-05-18 RX ORDER — TRAVOPROST 0.04 MG/ML
0 SOLUTION OPHTHALMIC DAILY
Qty: 5 | Refills: 5 | Status: DISCONTINUED | COMMUNITY
Start: 2020-03-30 | End: 2020-05-18

## 2020-07-09 ENCOUNTER — APPOINTMENT (OUTPATIENT)
Dept: FAMILY MEDICINE | Facility: CLINIC | Age: 60
End: 2020-07-09

## 2020-07-20 ENCOUNTER — APPOINTMENT (OUTPATIENT)
Dept: FAMILY MEDICINE | Facility: CLINIC | Age: 60
End: 2020-07-20
Payer: MEDICARE

## 2020-07-20 ENCOUNTER — LABORATORY RESULT (OUTPATIENT)
Age: 60
End: 2020-07-20

## 2020-07-20 PROCEDURE — 99214 OFFICE O/P EST MOD 30 MIN: CPT | Mod: 25

## 2020-07-20 PROCEDURE — 36415 COLL VENOUS BLD VENIPUNCTURE: CPT

## 2020-07-20 RX ORDER — BRIMONIDINE TARTRATE 2 MG/MG
0.2 SOLUTION/ DROPS OPHTHALMIC
Qty: 20 | Refills: 0 | Status: COMPLETED | COMMUNITY
Start: 2018-12-20 | End: 2020-07-20

## 2020-07-20 NOTE — COUNSELING
[Fall prevention counseling provided] : Fall prevention counseling provided [Adequate lighting] : Adequate lighting [No throw rugs] : No throw rugs [Use proper foot wear] : Use proper foot wear [Behavioral health counseling provided] : Behavioral health counseling provided [Sleep ___ hours/day] : Sleep [unfilled] hours/day [Engage in a relaxing activity] : Engage in a relaxing activity [Plan in advance] : Plan in advance [Cessation strategies including cessation program discussed] : Cessation strategies including cessation program discussed [Use of nicotine replacement therapies and other medications discussed] : Use of nicotine replacement therapies and other medications discussed [Willing to Quit Smoking] : Willing to quit smoking [Encouraged to pick a quit date and identify support needed to quit] : Encouraged to pick a quit date and identify support needed to quit [AUDIT-C Screening administered and reviewed] : AUDIT-C Screening administered and reviewed [Potential consequences of obesity discussed] : Potential consequences of obesity discussed [Structured Weight Management Program suggested:] : Structured weight management program suggested [Benefits of weight loss discussed] : Benefits of weight loss discussed [Encouraged to maintain food diary] : Encouraged to maintain food diary [Encouraged to use exercise tracking device] : Encouraged to use exercise tracking device [Encouraged to increase physical activity] : Encouraged to increase physical activity [Weigh Self Weekly] : weigh self weekly [Decrease Portions] : decrease portions [None] : None [Good understanding] : Patient has a good understanding of lifestyle changes and steps needed to achieve self management goal

## 2020-07-20 NOTE — HEALTH RISK ASSESSMENT
[No] : In the past 12 months have you used drugs other than those required for medical reasons? No [] : Yes [No falls in past year] : Patient reported no falls in the past year [0] : 2) Feeling down, depressed, or hopeless: Not at all (0) [de-identified] : Average [Audit-CScore] : 0 [de-identified] : Average

## 2020-07-20 NOTE — ASSESSMENT
[FreeTextEntry1] : Overweight/Fatigue/DM - Labs for Fatigue and Dysmetabolism.\par Low Vit. D - Vit. D Level.\par HLD - Controlled with Meds.\par HTN - Controlled with Meds.\par HCV Screening - Negative.\par HIV Screening - Negative.\par GERD - Controlled with Meds.\par Low Iron - Anemia Panel.\par \par \par F/U 4 wks to Review Test Results.\par \par \par

## 2020-07-20 NOTE — HISTORY OF PRESENT ILLNESS
[FreeTextEntry1] : F/U Apt. for Renewal of Meds and Lab Testing. [de-identified] : F/U Apt. for Renewal of Meds and Lab Testing.

## 2020-07-20 NOTE — REVIEW OF SYSTEMS
[Fatigue] : fatigue [Patient Intake Form Reviewed] : Patient intake form was reviewed [Negative] : Heme/Lymph [FreeTextEntry6] : Smoking [FreeTextEntry5] : HTN, HLD [de-identified] : Bipolar Disorder [FreeTextEntry1] : Low Vit. D, DM

## 2020-07-20 NOTE — PHYSICAL EXAM
[No Acute Distress] : no acute distress [Well Nourished] : well nourished [Well-Appearing] : well-appearing [Well Developed] : well developed [Normal Sclera/Conjunctiva] : normal sclera/conjunctiva [PERRL] : pupils equal round and reactive to light [EOMI] : extraocular movements intact [Normal Outer Ear/Nose] : the outer ears and nose were normal in appearance [Normal Oropharynx] : the oropharynx was normal [No JVD] : no jugular venous distention [No Lymphadenopathy] : no lymphadenopathy [Supple] : supple [Thyroid Normal, No Nodules] : the thyroid was normal and there were no nodules present [No Respiratory Distress] : no respiratory distress  [No Accessory Muscle Use] : no accessory muscle use [Normal Rate] : normal rate  [Clear to Auscultation] : lungs were clear to auscultation bilaterally [Regular Rhythm] : with a regular rhythm [Normal S1, S2] : normal S1 and S2 [No Murmur] : no murmur heard [No Carotid Bruits] : no carotid bruits [No Abdominal Bruit] : a ~M bruit was not heard ~T in the abdomen [No Varicosities] : no varicosities [Pedal Pulses Present] : the pedal pulses are present [No Edema] : there was no peripheral edema [No Palpable Aorta] : no palpable aorta [No Extremity Clubbing/Cyanosis] : no extremity clubbing/cyanosis [Non Tender] : non-tender [Soft] : abdomen soft [No Masses] : no abdominal mass palpated [Non-distended] : non-distended [No HSM] : no HSM [Normal Bowel Sounds] : normal bowel sounds [Normal Anterior Cervical Nodes] : no anterior cervical lymphadenopathy [Normal Posterior Cervical Nodes] : no posterior cervical lymphadenopathy [No CVA Tenderness] : no CVA  tenderness [No Spinal Tenderness] : no spinal tenderness [No Joint Swelling] : no joint swelling [Grossly Normal Strength/Tone] : grossly normal strength/tone [No Focal Deficits] : no focal deficits [Coordination Grossly Intact] : coordination grossly intact [Deep Tendon Reflexes (DTR)] : deep tendon reflexes were 2+ and symmetric [Normal Gait] : normal gait [Normal Affect] : the affect was normal [Normal Insight/Judgement] : insight and judgment were intact [de-identified] : 1.5 cm. Mass L Axilla

## 2020-07-21 ENCOUNTER — LABORATORY RESULT (OUTPATIENT)
Age: 60
End: 2020-07-21

## 2020-07-21 LAB
25(OH)D3 SERPL-MCNC: 29.6 NG/ML
ALBUMIN SERPL ELPH-MCNC: 4 G/DL
ALP BLD-CCNC: 58 U/L
ALT SERPL-CCNC: 8 U/L
ANION GAP SERPL CALC-SCNC: 10 MMOL/L
AST SERPL-CCNC: 12 U/L
BASOPHILS # BLD AUTO: 0.02 K/UL
BASOPHILS NFR BLD AUTO: 0.4 %
BILIRUB SERPL-MCNC: 0.3 MG/DL
BUN SERPL-MCNC: 22 MG/DL
C PEPTIDE SERPL-MCNC: 1.6 NG/ML
CALCIUM SERPL-MCNC: 9.4 MG/DL
CHLORIDE SERPL-SCNC: 104 MMOL/L
CHOLEST SERPL-MCNC: 179 MG/DL
CHOLEST/HDLC SERPL: 3.7 RATIO
CO2 SERPL-SCNC: 29 MMOL/L
CREAT SERPL-MCNC: 1.51 MG/DL
CREAT SPEC-SCNC: 189 MG/DL
EOSINOPHIL # BLD AUTO: 0.09 K/UL
EOSINOPHIL NFR BLD AUTO: 2 %
ESTIMATED AVERAGE GLUCOSE: 108 MG/DL
FERRITIN SERPL-MCNC: 164 NG/ML
FOLATE SERPL-MCNC: 8.5 NG/ML
GLUCOSE SERPL-MCNC: 80 MG/DL
HBA1C MFR BLD HPLC: 5.4 %
HCT VFR BLD CALC: 43 %
HDLC SERPL-MCNC: 49 MG/DL
HGB BLD-MCNC: 13.5 G/DL
IMM GRANULOCYTES NFR BLD AUTO: 0.4 %
IRON SATN MFR SERPL: 21 %
IRON SERPL-MCNC: 60 UG/DL
LDLC SERPL CALC-MCNC: 107 MG/DL
LYMPHOCYTES # BLD AUTO: 1.6 K/UL
LYMPHOCYTES NFR BLD AUTO: 35.6 %
MAN DIFF?: NORMAL
MCHC RBC-ENTMCNC: 30.1 PG
MCHC RBC-ENTMCNC: 31.4 GM/DL
MCV RBC AUTO: 95.8 FL
MICROALBUMIN 24H UR DL<=1MG/L-MCNC: 2.6 MG/DL
MICROALBUMIN/CREAT 24H UR-RTO: 14 MG/G
MONOCYTES # BLD AUTO: 0.53 K/UL
MONOCYTES NFR BLD AUTO: 11.8 %
NEUTROPHILS # BLD AUTO: 2.23 K/UL
NEUTROPHILS NFR BLD AUTO: 49.8 %
PLATELET # BLD AUTO: 222 K/UL
POTASSIUM SERPL-SCNC: 4.5 MMOL/L
PROT SERPL-MCNC: 7.8 G/DL
RBC # BLD: 4.49 M/UL
RBC # FLD: 15.1 %
SODIUM SERPL-SCNC: 144 MMOL/L
T4 SERPL-MCNC: 9.9 UG/DL
TIBC SERPL-MCNC: 289 UG/DL
TRIGL SERPL-MCNC: 113 MG/DL
TSH SERPL-ACNC: 1.09 UIU/ML
UIBC SERPL-MCNC: 229 UG/DL
VIT B12 SERPL-MCNC: 577 PG/ML
WBC # FLD AUTO: 4.49 K/UL

## 2020-07-23 LAB
APPEARANCE: ABNORMAL
BILIRUBIN URINE: NEGATIVE
BLOOD URINE: NEGATIVE
COLOR: YELLOW
GLUCOSE QUALITATIVE U: NEGATIVE
KETONES URINE: NORMAL
LEUKOCYTE ESTERASE URINE: ABNORMAL
NITRITE URINE: NEGATIVE
PH URINE: 6
PROTEIN URINE: NORMAL
SPECIFIC GRAVITY URINE: 1.02
UROBILINOGEN URINE: ABNORMAL

## 2020-10-19 ENCOUNTER — RX RENEWAL (OUTPATIENT)
Age: 60
End: 2020-10-19

## 2020-12-21 PROBLEM — N76.0 GARDNERELLA VAGINITIS: Status: RESOLVED | Noted: 2019-07-08 | Resolved: 2020-12-21

## 2020-12-21 PROBLEM — Z01.419 ENCOUNTER FOR GYNECOLOGICAL EXAMINATION: Status: RESOLVED | Noted: 2019-07-01 | Resolved: 2020-12-21

## 2021-01-07 ENCOUNTER — RX RENEWAL (OUTPATIENT)
Age: 61
End: 2021-01-07

## 2021-01-19 ENCOUNTER — APPOINTMENT (OUTPATIENT)
Dept: FAMILY MEDICINE | Facility: CLINIC | Age: 61
End: 2021-01-19

## 2021-03-09 ENCOUNTER — NON-APPOINTMENT (OUTPATIENT)
Age: 61
End: 2021-03-09

## 2021-05-04 ENCOUNTER — RX RENEWAL (OUTPATIENT)
Age: 61
End: 2021-05-04

## 2021-05-11 ENCOUNTER — RX RENEWAL (OUTPATIENT)
Age: 61
End: 2021-05-11

## 2021-06-13 ENCOUNTER — INPATIENT (INPATIENT)
Facility: HOSPITAL | Age: 61
LOS: 0 days | Discharge: HOME | DRG: 312 | End: 2021-06-14
Attending: INTERNAL MEDICINE | Admitting: HOSPITALIST
Payer: COMMERCIAL

## 2021-06-13 ENCOUNTER — TRANSCRIPTION ENCOUNTER (OUTPATIENT)
Age: 61
End: 2021-06-13

## 2021-06-13 VITALS
OXYGEN SATURATION: 99 % | HEART RATE: 81 BPM | HEIGHT: 66 IN | TEMPERATURE: 98 F | RESPIRATION RATE: 17 BRPM | DIASTOLIC BLOOD PRESSURE: 56 MMHG | WEIGHT: 149.91 LBS | SYSTOLIC BLOOD PRESSURE: 123 MMHG

## 2021-06-13 DIAGNOSIS — R55 SYNCOPE AND COLLAPSE: ICD-10-CM

## 2021-06-13 LAB
ALBUMIN SERPL ELPH-MCNC: 3.3 G/DL — SIGNIFICANT CHANGE UP (ref 3.3–5.2)
ALP SERPL-CCNC: 58 U/L — SIGNIFICANT CHANGE UP (ref 40–120)
ALT FLD-CCNC: <5 U/L — SIGNIFICANT CHANGE UP
ANION GAP SERPL CALC-SCNC: 10 MMOL/L — SIGNIFICANT CHANGE UP (ref 5–17)
APPEARANCE UR: CLEAR — SIGNIFICANT CHANGE UP
AST SERPL-CCNC: 14 U/L — SIGNIFICANT CHANGE UP
BACTERIA # UR AUTO: ABNORMAL
BASOPHILS # BLD AUTO: 0.02 K/UL — SIGNIFICANT CHANGE UP (ref 0–0.2)
BASOPHILS NFR BLD AUTO: 0.5 % — SIGNIFICANT CHANGE UP (ref 0–2)
BILIRUB SERPL-MCNC: 0.4 MG/DL — SIGNIFICANT CHANGE UP (ref 0.4–2)
BILIRUB UR-MCNC: ABNORMAL
BUN SERPL-MCNC: 17.2 MG/DL — SIGNIFICANT CHANGE UP (ref 8–20)
CALCIUM SERPL-MCNC: 9.2 MG/DL — SIGNIFICANT CHANGE UP (ref 8.6–10.2)
CHLORIDE SERPL-SCNC: 101 MMOL/L — SIGNIFICANT CHANGE UP (ref 98–107)
CO2 SERPL-SCNC: 27 MMOL/L — SIGNIFICANT CHANGE UP (ref 22–29)
COLOR SPEC: YELLOW — SIGNIFICANT CHANGE UP
CREAT SERPL-MCNC: 1.66 MG/DL — HIGH (ref 0.5–1.3)
D DIMER BLD IA.RAPID-MCNC: 401 NG/ML DDU — HIGH
DIFF PNL FLD: ABNORMAL
EOSINOPHIL # BLD AUTO: 0.05 K/UL — SIGNIFICANT CHANGE UP (ref 0–0.5)
EOSINOPHIL NFR BLD AUTO: 1.2 % — SIGNIFICANT CHANGE UP (ref 0–6)
EPI CELLS # UR: SIGNIFICANT CHANGE UP
GLUCOSE SERPL-MCNC: 72 MG/DL — SIGNIFICANT CHANGE UP (ref 70–99)
GLUCOSE UR QL: NEGATIVE MG/DL — SIGNIFICANT CHANGE UP
HCT VFR BLD CALC: 40.8 % — SIGNIFICANT CHANGE UP (ref 34.5–45)
HGB BLD-MCNC: 13.9 G/DL — SIGNIFICANT CHANGE UP (ref 11.5–15.5)
IMM GRANULOCYTES NFR BLD AUTO: 0.5 % — SIGNIFICANT CHANGE UP (ref 0–1.5)
KETONES UR-MCNC: ABNORMAL
LEUKOCYTE ESTERASE UR-ACNC: ABNORMAL
LYMPHOCYTES # BLD AUTO: 1.36 K/UL — SIGNIFICANT CHANGE UP (ref 1–3.3)
LYMPHOCYTES # BLD AUTO: 32.9 % — SIGNIFICANT CHANGE UP (ref 13–44)
MCHC RBC-ENTMCNC: 30.3 PG — SIGNIFICANT CHANGE UP (ref 27–34)
MCHC RBC-ENTMCNC: 34.1 GM/DL — SIGNIFICANT CHANGE UP (ref 32–36)
MCV RBC AUTO: 89.1 FL — SIGNIFICANT CHANGE UP (ref 80–100)
MONOCYTES # BLD AUTO: 0.7 K/UL — SIGNIFICANT CHANGE UP (ref 0–0.9)
MONOCYTES NFR BLD AUTO: 16.9 % — HIGH (ref 2–14)
NEUTROPHILS # BLD AUTO: 1.99 K/UL — SIGNIFICANT CHANGE UP (ref 1.8–7.4)
NEUTROPHILS NFR BLD AUTO: 48 % — SIGNIFICANT CHANGE UP (ref 43–77)
NITRITE UR-MCNC: NEGATIVE — SIGNIFICANT CHANGE UP
PH UR: 6 — SIGNIFICANT CHANGE UP (ref 5–8)
PLATELET # BLD AUTO: 217 K/UL — SIGNIFICANT CHANGE UP (ref 150–400)
POTASSIUM SERPL-MCNC: 4.6 MMOL/L — SIGNIFICANT CHANGE UP (ref 3.5–5.3)
POTASSIUM SERPL-SCNC: 4.6 MMOL/L — SIGNIFICANT CHANGE UP (ref 3.5–5.3)
PROT SERPL-MCNC: 8.2 G/DL — SIGNIFICANT CHANGE UP (ref 6.6–8.7)
PROT UR-MCNC: 30 MG/DL
RAPID RVP RESULT: SIGNIFICANT CHANGE UP
RBC # BLD: 4.58 M/UL — SIGNIFICANT CHANGE UP (ref 3.8–5.2)
RBC # FLD: 14.4 % — SIGNIFICANT CHANGE UP (ref 10.3–14.5)
RBC CASTS # UR COMP ASSIST: SIGNIFICANT CHANGE UP /HPF (ref 0–4)
SARS-COV-2 RNA SPEC QL NAA+PROBE: SIGNIFICANT CHANGE UP
SODIUM SERPL-SCNC: 137 MMOL/L — SIGNIFICANT CHANGE UP (ref 135–145)
SP GR SPEC: 1.01 — SIGNIFICANT CHANGE UP (ref 1.01–1.02)
UROBILINOGEN FLD QL: 4 MG/DL
VALPROATE SERPL-MCNC: 47.4 UG/ML — LOW (ref 50–100)
WBC # BLD: 4.14 K/UL — SIGNIFICANT CHANGE UP (ref 3.8–10.5)
WBC # FLD AUTO: 4.14 K/UL — SIGNIFICANT CHANGE UP (ref 3.8–10.5)
WBC UR QL: ABNORMAL

## 2021-06-13 PROCEDURE — 93010 ELECTROCARDIOGRAM REPORT: CPT

## 2021-06-13 PROCEDURE — 99285 EMERGENCY DEPT VISIT HI MDM: CPT | Mod: GC

## 2021-06-13 PROCEDURE — 71045 X-RAY EXAM CHEST 1 VIEW: CPT | Mod: 26

## 2021-06-13 PROCEDURE — 70450 CT HEAD/BRAIN W/O DYE: CPT | Mod: 26,MG

## 2021-06-13 PROCEDURE — G1004: CPT

## 2021-06-13 PROCEDURE — 99223 1ST HOSP IP/OBS HIGH 75: CPT

## 2021-06-13 RX ORDER — DIVALPROEX SODIUM 500 MG/1
1000 TABLET, DELAYED RELEASE ORAL AT BEDTIME
Refills: 0 | Status: DISCONTINUED | OUTPATIENT
Start: 2021-06-13 | End: 2021-06-14

## 2021-06-13 RX ORDER — SODIUM CHLORIDE 9 MG/ML
1000 INJECTION INTRAMUSCULAR; INTRAVENOUS; SUBCUTANEOUS ONCE
Refills: 0 | Status: DISCONTINUED | OUTPATIENT
Start: 2021-06-13 | End: 2021-06-13

## 2021-06-13 RX ORDER — HALOPERIDOL DECANOATE 100 MG/ML
0 INJECTION INTRAMUSCULAR
Qty: 0 | Refills: 0 | DISCHARGE

## 2021-06-13 RX ORDER — BENZTROPINE MESYLATE 1 MG
1 TABLET ORAL AT BEDTIME
Refills: 0 | Status: DISCONTINUED | OUTPATIENT
Start: 2021-06-13 | End: 2021-06-14

## 2021-06-13 RX ORDER — NETARSUDIL 0.2 MG/ML
1 SOLUTION/ DROPS OPHTHALMIC; TOPICAL
Qty: 0 | Refills: 0 | DISCHARGE

## 2021-06-13 RX ORDER — ACETAMINOPHEN 500 MG
650 TABLET ORAL ONCE
Refills: 0 | Status: COMPLETED | OUTPATIENT
Start: 2021-06-13 | End: 2021-06-13

## 2021-06-13 RX ORDER — LATANOPROST 0.05 MG/ML
1 SOLUTION/ DROPS OPHTHALMIC; TOPICAL AT BEDTIME
Refills: 0 | Status: DISCONTINUED | OUTPATIENT
Start: 2021-06-13 | End: 2021-06-14

## 2021-06-13 RX ORDER — ENOXAPARIN SODIUM 100 MG/ML
40 INJECTION SUBCUTANEOUS DAILY
Refills: 0 | Status: DISCONTINUED | OUTPATIENT
Start: 2021-06-13 | End: 2021-06-14

## 2021-06-13 RX ORDER — CEPHALEXIN 500 MG
500 CAPSULE ORAL ONCE
Refills: 0 | Status: COMPLETED | OUTPATIENT
Start: 2021-06-13 | End: 2021-06-13

## 2021-06-13 RX ORDER — BRIMONIDINE TARTRATE, TIMOLOL MALEATE 2; 5 MG/ML; MG/ML
1 SOLUTION/ DROPS OPHTHALMIC
Qty: 0 | Refills: 0 | DISCHARGE

## 2021-06-13 RX ADMIN — Medication 500 MILLIGRAM(S): at 18:38

## 2021-06-13 RX ADMIN — Medication 650 MILLIGRAM(S): at 20:26

## 2021-06-13 RX ADMIN — DIVALPROEX SODIUM 1000 MILLIGRAM(S): 500 TABLET, DELAYED RELEASE ORAL at 21:48

## 2021-06-13 RX ADMIN — Medication 1 MILLIGRAM(S): at 21:48

## 2021-06-13 NOTE — ED PROVIDER NOTE - PHYSICAL EXAMINATION
Constitutional: Awake, alert, in no acute distress  Eyes: no scleral icterus  HENT: normocephalic, atraumatic, moist oral mucosa  Neck: supple  CV: RRR, no murmur  Pulm: non-labored respirations, CTAB  Abdomen: soft, non-tender, non-distended  Extremities: no edema, no deformity  Skin: no rash, no jaundice  Neuro: AAOx3, CNs II-XII intact, no facial asymmetry, 5/5 strength and sensation in all extremities, no finger-to-nose or heel-to-shin dysmetria, negative Romberg's, slow but stable gait.

## 2021-06-13 NOTE — H&P ADULT - HISTORY OF PRESENT ILLNESS
59 y/o female with PMH of HTN, bipolar, glaucoma came to the ED s/p fall. Patient reported doing laundry today, felt dizzy and fell hitting her head. She reported similar episodes few months ago did not seek any medical intervention at the time. Patient reported decrease appetite and sometimes shortness of breath which she attributed to chronic cigarette smoking. She has no LOC, chest pain, palpitation, nausea, vomiting, abdominal pain, diarrhea, dysuria, hematuria, tongue bite, bowel/bladder incontinence, weakness, numbness.

## 2021-06-13 NOTE — H&P ADULT - NSICDXFAMILYHX_GEN_ALL_CORE_FT
FAMILY HISTORY:  Sibling  Still living? Yes, Estimated age: 61-70  FHx: breast cancer, Age at diagnosis: Age Unknown

## 2021-06-13 NOTE — ED PROVIDER NOTE - ATTENDING CONTRIBUTION TO CARE
60y F w/ hx bipolar, HTN, CKD, presenting for fall after near syncopal episode today.  Pt with stable VS, no focal neurologic deficits on exam.  Will check CT head, EKG, labs, UA, CXR, reassess.

## 2021-06-13 NOTE — ED PROVIDER NOTE - CARE PLAN
Principal Discharge DX:	Near syncope   Principal Discharge DX:	Near syncope  Secondary Diagnosis:	UTI (urinary tract infection)

## 2021-06-13 NOTE — H&P ADULT - NSICDXPASTMEDICALHX_GEN_ALL_CORE_FT
PAST MEDICAL HISTORY:  Bipolar disorder     CKD (chronic kidney disease)     Glaucoma     HTN (hypertension)

## 2021-06-13 NOTE — ED ADULT NURSE REASSESSMENT NOTE - NS ED NURSE REASSESS COMMENT FT1
assumed care of pt, Pt in no apparent distress at this time. Airway patent, breathing spontaneous and nonlabored. Pt A&Ox3 resting in stretcher. Pt no complaints at this time.

## 2021-06-13 NOTE — H&P ADULT - ASSESSMENT
59 y/o female with PMH of HTN, bipolar, glaucoma came to the ED s/p fall. Patient reported doing laundry today, felt dizzy and fell hitting her head. She reported similar episodes few months ago did not seek any medical intervention at the time. Patient reported decrease appetite and sometimes shortness of breath which she attributed to chronic cigarette smoking.     In the ED, ECG done: TWI in inferiorlateral lead, troponin x1 negative; BNP: 2303. Cardiology consulted. Also UA in the ED noted. Given Keflex once, will hold up since patient is asymptomatic    Dizziness/fall   Admit to telemetry   CT head: no intracranial finding   ECG as noted above, there is no prior to compare. Cardiology consulted  Will check d-dimer, patient smokes and recently started working with her  as   Fall precaution   Trend troponin   Will check echo   Cardiology on board     Bipolar disorder   Haldol q-monthly   cogentin 1mg HS   Depakote 1000mg HS     HTN  As per patient, PCP held her Amlodipine and Metoprolol because her BP as been on the low side  Monitor BP     Glaucoma   Latanoprost HS both eyes   Combigan bid     Supportive  DVT prophylaxis: Lovenox   Diet: DASH    Plan of care discussed with patient     60 minutes spent

## 2021-06-13 NOTE — ED PROVIDER NOTE - PROGRESS NOTE DETAILS
Juan: EKG reviewed -- no prior available for comparison.  Consult placed to [facility]. Juan: EKG reviewed -- no prior available for comparison.  Consult placed to [facility]. Juan: Spoke to Dr. Durbin, who did not have prior EKG available for comparison but requests observation for serial troponins.  If pt remains stable, then she may be discharged tomorrow morning and f/u in the office for echo and further workup. Reviewed all results with pt as well as plans for admission. Pt is comfortable with plan for admission. Questions answered. - Hardeep Portillo, PGY-2

## 2021-06-13 NOTE — ED PROVIDER NOTE - CLINICAL SUMMARY MEDICAL DECISION MAKING FREE TEXT BOX
60y F w/ hx bipolar, HTN, CKD, presenting for fall after near syncopal episode today.  Pt with stable VS, no focal neurologic deficits on exam.  Will check CT head, EKG, labs, UA.  Treat with IV fluids, reassess. 60y F w/ hx bipolar, HTN, CKD, presenting for fall after near syncopal episode today.  Pt with stable VS, no focal neurologic deficits on exam.  Will check CT head, EKG, labs, UA, CXR. 60y F w/ hx bipolar, HTN, CKD, presenting for fall after near syncopal episode today.  Pt with stable VS, no focal neurologic deficits on exam.  Will check CT head, EKG, labs, UA, CXR, reassess.

## 2021-06-13 NOTE — ED PROVIDER NOTE - NS ED ROS FT
Constitutional: no fever, no chills, +decreased appetite  Eyes: no vision changes  ENT: no nasal congestion, no sore throat  CV: no chest pain  Resp: no cough, no shortness of breath  GI: no abdominal pain, no vomiting, no diarrhea  : no dysuria  MSK: no joint pain  Skin: no rash  Neuro: +headache, no weakness, no paresthesias, +dizziness

## 2021-06-13 NOTE — ED PROVIDER NOTE - OBJECTIVE STATEMENT
60y F w/ hx HTN, bipolar (on depakote and monthly haldol injections), glaucoma, CKD, presenting for near syncopal episode.  Pt reports that she was at home today when she started feeling off-balance and fell, hitting her head.  Complains of mild headache; denies LOC, chest pain, SOB, palpitations, fever, chills, cough, n/v/d, urinary complaints, vision changes, focal weakness, trouble speaking/swallowing, paresthesias.  No reported seizure-like activity.   states that pt has had decreased appetite over the past few days.  Denies recent changes in medications.  Pt admits to feeling depressed over the past few years since her son passed away, but denies SI or HI.

## 2021-06-13 NOTE — ED ADULT TRIAGE NOTE - CHIEF COMPLAINT QUOTE
pt presents to ED c/o falls x one year caused by dizziness.  pt states she fell today and hit her head.  denies blood thinners or LOC.  speech mildly slurred, as per  pts speech has been like this for months.   states patient has not been eating well since Friday.

## 2021-06-14 ENCOUNTER — TRANSCRIPTION ENCOUNTER (OUTPATIENT)
Age: 61
End: 2021-06-14

## 2021-06-14 VITALS
SYSTOLIC BLOOD PRESSURE: 130 MMHG | DIASTOLIC BLOOD PRESSURE: 70 MMHG | TEMPERATURE: 98 F | OXYGEN SATURATION: 95 % | RESPIRATION RATE: 17 BRPM | HEART RATE: 52 BPM

## 2021-06-14 LAB
ANION GAP SERPL CALC-SCNC: 8 MMOL/L — SIGNIFICANT CHANGE UP (ref 5–17)
BUN SERPL-MCNC: 17.3 MG/DL — SIGNIFICANT CHANGE UP (ref 8–20)
CALCIUM SERPL-MCNC: 9.3 MG/DL — SIGNIFICANT CHANGE UP (ref 8.6–10.2)
CHLORIDE SERPL-SCNC: 100 MMOL/L — SIGNIFICANT CHANGE UP (ref 98–107)
CO2 SERPL-SCNC: 29 MMOL/L — SIGNIFICANT CHANGE UP (ref 22–29)
COVID-19 SPIKE DOMAIN AB INTERP: POSITIVE
COVID-19 SPIKE DOMAIN ANTIBODY RESULT: >250 U/ML — HIGH
CREAT SERPL-MCNC: 1.31 MG/DL — HIGH (ref 0.5–1.3)
GLUCOSE SERPL-MCNC: 70 MG/DL — SIGNIFICANT CHANGE UP (ref 70–99)
HCV AB S/CO SERPL IA: 0.14 S/CO — SIGNIFICANT CHANGE UP (ref 0–0.99)
HCV AB SERPL-IMP: SIGNIFICANT CHANGE UP
MAGNESIUM SERPL-MCNC: 1.8 MG/DL — SIGNIFICANT CHANGE UP (ref 1.6–2.6)
PHOSPHATE SERPL-MCNC: 3.5 MG/DL — SIGNIFICANT CHANGE UP (ref 2.4–4.7)
POTASSIUM SERPL-MCNC: 4.4 MMOL/L — SIGNIFICANT CHANGE UP (ref 3.5–5.3)
POTASSIUM SERPL-SCNC: 4.4 MMOL/L — SIGNIFICANT CHANGE UP (ref 3.5–5.3)
SARS-COV-2 IGG+IGM SERPL QL IA: >250 U/ML — HIGH
SARS-COV-2 IGG+IGM SERPL QL IA: POSITIVE
SODIUM SERPL-SCNC: 137 MMOL/L — SIGNIFICANT CHANGE UP (ref 135–145)

## 2021-06-14 PROCEDURE — 81001 URINALYSIS AUTO W/SCOPE: CPT

## 2021-06-14 PROCEDURE — 84484 ASSAY OF TROPONIN QUANT: CPT

## 2021-06-14 PROCEDURE — 80053 COMPREHEN METABOLIC PANEL: CPT

## 2021-06-14 PROCEDURE — 80164 ASSAY DIPROPYLACETIC ACD TOT: CPT

## 2021-06-14 PROCEDURE — 85379 FIBRIN DEGRADATION QUANT: CPT

## 2021-06-14 PROCEDURE — 70450 CT HEAD/BRAIN W/O DYE: CPT

## 2021-06-14 PROCEDURE — 99239 HOSP IP/OBS DSCHRG MGMT >30: CPT

## 2021-06-14 PROCEDURE — 86769 SARS-COV-2 COVID-19 ANTIBODY: CPT

## 2021-06-14 PROCEDURE — 71045 X-RAY EXAM CHEST 1 VIEW: CPT | Mod: 26

## 2021-06-14 PROCEDURE — 93970 EXTREMITY STUDY: CPT | Mod: 26

## 2021-06-14 PROCEDURE — 36415 COLL VENOUS BLD VENIPUNCTURE: CPT

## 2021-06-14 PROCEDURE — 80048 BASIC METABOLIC PNL TOTAL CA: CPT

## 2021-06-14 PROCEDURE — 93005 ELECTROCARDIOGRAM TRACING: CPT

## 2021-06-14 PROCEDURE — 84100 ASSAY OF PHOSPHORUS: CPT

## 2021-06-14 PROCEDURE — 93970 EXTREMITY STUDY: CPT

## 2021-06-14 PROCEDURE — 83880 ASSAY OF NATRIURETIC PEPTIDE: CPT

## 2021-06-14 PROCEDURE — 86803 HEPATITIS C AB TEST: CPT

## 2021-06-14 PROCEDURE — 0225U NFCT DS DNA&RNA 21 SARSCOV2: CPT

## 2021-06-14 PROCEDURE — 99285 EMERGENCY DEPT VISIT HI MDM: CPT | Mod: 25

## 2021-06-14 PROCEDURE — 93306 TTE W/DOPPLER COMPLETE: CPT | Mod: 26

## 2021-06-14 PROCEDURE — 71045 X-RAY EXAM CHEST 1 VIEW: CPT

## 2021-06-14 PROCEDURE — 85025 COMPLETE CBC W/AUTO DIFF WBC: CPT

## 2021-06-14 PROCEDURE — 83735 ASSAY OF MAGNESIUM: CPT

## 2021-06-14 PROCEDURE — 93306 TTE W/DOPPLER COMPLETE: CPT

## 2021-06-14 PROCEDURE — 97163 PT EVAL HIGH COMPLEX 45 MIN: CPT

## 2021-06-14 RX ORDER — LATANOPROST 0.05 MG/ML
1 SOLUTION/ DROPS OPHTHALMIC; TOPICAL
Qty: 0 | Refills: 0 | DISCHARGE

## 2021-06-14 RX ORDER — DIVALPROEX SODIUM 500 MG/1
2 TABLET, DELAYED RELEASE ORAL
Qty: 0 | Refills: 0 | DISCHARGE

## 2021-06-14 RX ORDER — BENZTROPINE MESYLATE 1 MG
1 TABLET ORAL
Qty: 0 | Refills: 0 | DISCHARGE

## 2021-06-14 RX ORDER — AMLODIPINE BESYLATE 2.5 MG/1
1 TABLET ORAL
Qty: 0 | Refills: 0 | DISCHARGE

## 2021-06-14 RX ORDER — METOPROLOL TARTRATE 50 MG
1 TABLET ORAL
Qty: 0 | Refills: 0 | DISCHARGE

## 2021-06-14 RX ORDER — LATANOPROST 0.05 MG/ML
1 SOLUTION/ DROPS OPHTHALMIC; TOPICAL
Qty: 0 | Refills: 0 | DISCHARGE
Start: 2021-06-14

## 2021-06-14 RX ORDER — TIMOLOL 0.5 %
1 DROPS OPHTHALMIC (EYE) EVERY 12 HOURS
Refills: 0 | Status: DISCONTINUED | OUTPATIENT
Start: 2021-06-14 | End: 2021-06-14

## 2021-06-14 RX ORDER — BENZTROPINE MESYLATE 1 MG
1 TABLET ORAL
Qty: 0 | Refills: 0 | DISCHARGE
Start: 2021-06-14

## 2021-06-14 RX ORDER — DIVALPROEX SODIUM 500 MG/1
2 TABLET, DELAYED RELEASE ORAL
Qty: 0 | Refills: 0 | DISCHARGE
Start: 2021-06-14

## 2021-06-14 RX ORDER — CEPHALEXIN 500 MG
1 CAPSULE ORAL
Qty: 6 | Refills: 0
Start: 2021-06-14 | End: 2021-06-16

## 2021-06-14 RX ORDER — BRIMONIDINE TARTRATE 2 MG/MG
1 SOLUTION/ DROPS OPHTHALMIC
Refills: 0 | Status: DISCONTINUED | OUTPATIENT
Start: 2021-06-14 | End: 2021-06-14

## 2021-06-14 RX ORDER — ASPIRIN/CALCIUM CARB/MAGNESIUM 324 MG
81 TABLET ORAL
Qty: 0 | Refills: 0 | DISCHARGE

## 2021-06-14 RX ADMIN — LATANOPROST 1 DROP(S): 0.05 SOLUTION/ DROPS OPHTHALMIC; TOPICAL at 00:00

## 2021-06-14 RX ADMIN — Medication 1 DROP(S): at 17:57

## 2021-06-14 RX ADMIN — BRIMONIDINE TARTRATE 1 DROP(S): 2 SOLUTION/ DROPS OPHTHALMIC at 17:56

## 2021-06-14 RX ADMIN — ENOXAPARIN SODIUM 40 MILLIGRAM(S): 100 INJECTION SUBCUTANEOUS at 12:34

## 2021-06-14 NOTE — DISCHARGE NOTE PROVIDER - NSDCCPCAREPLAN_GEN_ALL_CORE_FT
PRINCIPAL DISCHARGE DIAGNOSIS  Diagnosis: Near syncope  Assessment and Plan of Treatment: secodnary to excess blood pressure medications. Please stop taking your blood pressure medications. Please monitor your blood pressure closely. Please keep yourself hydrated and follow up with your cardiologist for further management of your blood pressure. Your echocardiogram showed preserved ejection fraction and no other noted abnormalities.      SECONDARY DISCHARGE DIAGNOSES  Diagnosis: Acute UTI  Assessment and Plan of Treatment: Continue with antibiotics for 3 days. Please follow up with your primary care physician within one week.    Diagnosis: Superficial vein thrombosis  Assessment and Plan of Treatment: Thrombosis of the right lesser saphenous vein within the right knee and right calf. Thrombosis of the right greater saphenous vein in its proximal and distal component. 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 Please remember to get up and ambulate every 2-3 hours and also continue to take aspirin 81mg po qd daily. Please have a repeat Ultrasound with your primary care physician in 2-3 weeks to ensure the clot has not propagated or become deep.    Diagnosis: Bipolar disorder  Assessment and Plan of Treatment: Continue with home medications, please follow up with your psychiatrist with scheduled appointment. Depakote level is within normal limits.    Diagnosis: Glaucoma  Assessment and Plan of Treatment: Continue with home medications.    Diagnosis: Tobacco dependence  Assessment and Plan of Treatment: Smoking cessation advised. consider nicotine replacement therapy.    Diagnosis: Acute kidney failure  Assessment and Plan of Treatment: secondary to dehydration and low blood pressure. Continue to remain hydrated and repeat bun/creatine labs with your primary care physician.

## 2021-06-14 NOTE — DISCHARGE NOTE PROVIDER - NSDCFUADDAPPT_GEN_ALL_CORE_FT
Please follow up with [facility] in 1-2 weeks. Please follow up with your primary care physician in 1 week. Please also follow up with your psychiatrist in 1-2 weeks.

## 2021-06-14 NOTE — DISCHARGE NOTE PROVIDER - NSDCMRMEDTOKEN_GEN_ALL_CORE_FT
aspirin 81 mg oral tablet: 81 milligram(s) orally once a day  benztropine 1 mg oral tablet: 1 tab(s) orally once a day (at bedtime)  Combigan 0.2%-0.5% ophthalmic solution: 1 drop(s) to each affected eye every 12 hours  divalproex sodium 500 mg oral tablet, extended release: 2 tab(s) orally once a day (at bedtime)  Haldol Decanoate 50 mg/mL intramuscular solution: intramuscular every 4 weeks  Keflex 500 mg oral capsule: 1 cap(s) orally 2 times a day   latanoprost 0.005% ophthalmic solution: 1 drop(s) to each affected eye once a day (at bedtime)  Rhopressa 0.02% ophthalmic solution: 1 drop(s) to each affected eye once a day (in the evening)

## 2021-06-14 NOTE — CONSULT NOTE ADULT - SUBJECTIVE AND OBJECTIVE BOX
[facility], [facility]                                                    [address], [address], [place] [address]                                                         PHONE: [phone]    FAX: [phone] [address], [address], [place] [address]                                                 PHONE: [phone]    FAX: [phone]  *******************************************************************************    Reason for Consult: Syncope    HPI:  CARLTON GARCIA is a 60y Female with h/o pre-DM, HTN, HL, bipolar disorder, tobacco a/w fall.  Patient reports she was doing laundry and then became dizzy and fell back hitting her head.  She does not think she had LOC.  No chest pain at the time but she reports occasional diffuse chest pain greatest at rest with no clear exacerbating or relieving factors.  No exertional chest pain or associated N/V, diaphoresis or radiation.  + Intermittent SOB attributed to active tobacco use.  No bowel or bladder incontinence or tongue biting.  No palpitations, orthopnea, PND, LE edema.  No fever, chills or cough.  She reports 1 prior episode of syncope and 2 prior falls.    PAST MEDICAL & SURGICAL HISTORY:  HTN (hypertension)    Bipolar disorder    Glaucoma    CKD (chronic kidney disease)    No significant past surgical history        No Known Allergies      MEDICATIONS  (STANDING):  benztropine 1 milliGRAM(s) Oral at bedtime  brimonidine 0.2% Ophthalmic Solution 1 Drop(s) Both EYES two times a day  diVALproex ER 1000 milliGRAM(s) Oral at bedtime  enoxaparin Injectable 40 milliGRAM(s) SubCutaneous daily  latanoprost 0.005% Ophthalmic Solution 1 Drop(s) Both EYES at bedtime  timolol 0.5% Solution 1 Drop(s) Both EYES every 12 hours    MEDICATIONS  (PRN):      Social History: + active tobacco 1 PPD since age 29, no EtOH or IVDA    Family History: FHx: breast cancer (Sibling)        ROS: As noted above, otherwise unremarkable.    Vital Signs Last 24 Hrs  T(C): 36.5 (14 Jun 2021 00:13), Max: 36.8 (13 Jun 2021 14:26)  T(F): 97.7 (14 Jun 2021 00:13), Max: 98.3 (13 Jun 2021 14:26)  HR: 47 (14 Jun 2021 00:13) (47 - 81)  BP: 132/72 (14 Jun 2021 00:13) (123/56 - 132/72)  BP(mean): --  RR: 16 (14 Jun 2021 00:13) (16 - 17)  SpO2: 94% (14 Jun 2021 00:13) (94% - 99%)    I&O's Detail    I&O's Summary          PHYSICAL EXAM:  General: Appears well developed, well nourished, no acute distress  HEENT: Head: normocephalic, atraumatic  Eyes: Pupils equal and reactive  Neck: Supple, no carotid bruit, no JVD, no HJR  CARDIOVASCULAR: Normal S1 and S2, no murmur, rub, or gallop  LUNGS: Clear to auscultation bilaterally, no rales, rhonchi or wheeze  ABDOMEN: Soft, nontender, non-distended, positive bowel sounds, no mass or bruit  EXTREMITIES: No edema, distal pulses WNL  SKIN: Warm and dry with normal turgor  NEURO: Alert & oriented x 3, grossly intact  PSYCH: normal mood and affect    LABS:                        13.9   4.14  )-----------( 217      ( 13 Jun 2021 16:20 )             40.8     06-13    137  |  101  |  17.2  ----------------------------<  72  4.6   |  27.0  |  1.66<H>    Ca    9.2      13 Jun 2021 16:20  Mg     1.8     06-13    TPro  8.2  /  Alb  3.3  /  TBili  0.4  /  DBili  x   /  AST  14  /  ALT  <5  /  AlkPhos  58  06-13    CARDIAC MARKERS ( 13 Jun 2021 16:20 )  x     / <0.01 ng/mL / x     / x     / x              RADIOLOGY & ADDITIONAL STUDIES:    EXAM:  CT BRAIN                        PROCEDURE DATE:  06/13/2021    IMPRESSION: No evidence acute hemorrhage, mass or mass effect.    ECG: sinus bradycardia @ 48 bpm, LVH, diffuse deep TWI greatest in inferior and anterolateral leads c/w LVH strain vs ischemia    ECHO (11/8/19): normal LV fxn (EF 55-60%), mild MR, mild TR, mild pulmonary HTN (RVSP 35.2 mmHg), dilatation of ascending aorta 4.0 cm    CAROTID DUPLEX SCAN (11/8/19): mild atherosclerosis with no evidence of significant stenosis bilaterally    TELEMETRY PERSONALLY REVIEWED: sinus bradycardia 40-50s      Assessment and Plan:  In summary, CARLTON GARCIA is a 60F a/w fall (denies syncope), chronic grossly abnormal EKG (troponin negative), sinus bradycardia, UTI, ? ARF vs CRI (initial Cr 1.66), h/o pre-DM, HTN, HL, bipolar disorder, tobacco  - Monitor on telemetry  - Orthostatics  - Check repeat troponin & EKG  - Stable cardiovascular status, no evidence of ischemia clinically.  Chronic grossly abnormal EKG, no chest pain, troponin negative.  Continue medical management.  Outpatient ischemic evaluation (i.e. stress test) can be performed for further risk stratification.  - Increased BNP 2303 on 6/13/21 non-specific, no evidence of CHF clinically, lungs clear on exam.  Chest CXR.  ARF vs CRI.  Trend creatinine.  - Echocardiogram 11/8/19 demonstrated normal LV fxn (EF 55-60%), mild MR, mild TR, mild pulmonary HTN (RVSP 35.2 mmHg), dilatation of ascending aorta 4.0 cm = repeat study pending  - Carotid Duplex Scan 11/8/19 demonstrated mild atherosclerosis with no evidence of significant stenosis bilaterally  - CT brain 6/13/21 negative.  Further work-up & testing per medicine.  - Rhythm sinus bradycardia (patient asymptomatic), BP stable.  She was on Metoprolol XL & Amlodipine as an outpatient but these may have been held recently due to low BP.  Maintain off of BP medication for now and monitor vitals closely.  No indication for a pacemaker at this time given asymptomatic sinus bradycardia.  Assess HR with ambulation.  - D-dimer increased 401 on 6/13/21 = LE dopplers pending per medicine  - D/C planning per medicine.  Outpatient follow-up at [facility] upon discharge.    We will follow with you.  Thank you for allowing me to participate in the care of your patient.      Sincerely,    Marcel Sullivan MD

## 2021-06-14 NOTE — PATIENT PROFILE ADULT - DOES PATIENT HAVE ADVANCE DIRECTIVE
Transitioned to level 4, comfort care, on 07/25  Palliative following  Pt has multiple children who are visiting him and family wants to continue to let them visit; therefore, pt is not a candidate for inpatient hospice care at this time  CM consulted and aware, need to determine if home hospice is an option   Continue AEDs and comfort care measures with Ativan, Haldol and Robinol PRN No

## 2021-06-14 NOTE — DISCHARGE NOTE NURSING/CASE MANAGEMENT/SOCIAL WORK - NSDCFUADDAPPT_GEN_ALL_CORE_FT
Please follow up with [place] [facility] in 1-2 weeks. Please follow up with your primary care physician in 1 week. Please also follow up with your psychiatrist in 1-2 weeks.

## 2021-06-14 NOTE — DISCHARGE NOTE PROVIDER - CARE PROVIDER_API CALL
Marcel Sullivan)  Cardiology; Internal Medicine  [address]  [place] [address]  Phone: [phone]  Fax: [phone]  Follow Up Time:

## 2021-06-14 NOTE — DISCHARGE NOTE NURSING/CASE MANAGEMENT/SOCIAL WORK - PATIENT PORTAL LINK FT
You can access the FollowMyHealth Patient Portal offered by [facility] by registering at the following website: [email or web address]. By joining [facility]’s FollowMyHealth portal, you will also be able to view your health information using other applications (apps) compatible with our system.

## 2021-06-14 NOTE — DISCHARGE NOTE PROVIDER - HOSPITAL COURSE
59 y/o female with hx of HTN, bipolar, tobacco dependence, glaucoma who presented to the ED s/p fall. Prior to fall had felt light headed and then fell + head trauma to the back of her head. CT head negative for any findings. Pt admitted with near syncope, cardiology consulted and thought to be related to pts blood pressure medications, pt takes norvasc and metoprolol as an outpt was noted to have bradycardia and low normal bp. But pt continued to take her medications and has felt unwell for some time. This was also her second fall. Cardiac enzymes were negative, ekg unchanged from prior, cardiology evaluated the pt and ordered ECHO which showed preserved EF and no other abnormal findings. Noted bradycardia which will be followed by cardio outpt, Cardio cleared pt for dispo. Currently pt also reported not eating or hydrating well bc she has been on the road with her  who is a . Given pts immobile stable pt also had d dimer completed which was 400 as well as venous duplex which showed evidence of superficial vein thrombosis in the R lesser and greater saphenous vein. Pt is asymptomatic and has no issues or pain within the calf., Pt advised to c/w asa therapy and repeat venous duplex in 2-3 weeks with PCP. Incidentally also noted with UA positive, pt reports slight sx of dysuria and was given keflex short course to finish outpt. Initial LILIA likely related to low bp from outpt, trended down from 1.6 to 1.3 after being given IVF. Pt advised to avoid nephrotoxic medications. Pt advised to hydrate and take in better nutrition. Pt recommended to f/u with her pmd in 1 week. Pt medically stable to be discharged home. Evaluated by PT and independent.              Vital Signs Last 24 Hrs  T(C): 36.7 (14 Jun 2021 09:00), Max: 36.7 (14 Jun 2021 09:00)  T(F): 98 (14 Jun 2021 09:00), Max: 98 (14 Jun 2021 09:00)  HR: 52 (14 Jun 2021 09:00) (47 - 52)  BP: 130/70 (14 Jun 2021 09:00) (130/70 - 132/72)  BP(mean): --  RR: 17 (14 Jun 2021 09:00) (16 - 17)  SpO2: 95% (14 Jun 2021 09:00) (94% - 95%)      CONSTITUTIONAL: Well appearing, well nourished, awake, alert and in no apparent distress  ENMT: Airway patent, Nasal mucosa clear. Mouth with normal mucosa. Throat has no vesicles, no oropharyngeal exudates and uvula is midline.  EYES: Clear bilaterally, pupils equal, round and reactive to light.   CARDIAC: Normal rate, regular rhythm.  Heart sounds S1, S2.  No murmurs, rubs or gallops   RESPIRATORY: Breath sounds clear and equal bilaterally. No wheezes, rhales or rhonchi  GASTROENTEROLOGY: soft nt nd bs +normo active   MUSCULOSKELETAL: Spine appears normal, range of motion is not limited, no muscle or joint tenderness  EXTREMITIES: No edema, cyanosis or deformity   NEUROLOGICAL: Alert and oriented, no focal deficits, no motor or sensory deficits.  SKIN: No rash, skin turgor wnl              Discharge planning took 47 minutes

## 2021-06-15 ENCOUNTER — APPOINTMENT (OUTPATIENT)
Dept: FAMILY MEDICINE | Facility: CLINIC | Age: 61
End: 2021-06-15
Payer: COMMERCIAL

## 2021-06-15 ENCOUNTER — LABORATORY RESULT (OUTPATIENT)
Age: 61
End: 2021-06-15

## 2021-06-15 ENCOUNTER — NON-APPOINTMENT (OUTPATIENT)
Age: 61
End: 2021-06-15

## 2021-06-15 ENCOUNTER — TRANSCRIPTION ENCOUNTER (OUTPATIENT)
Age: 61
End: 2021-06-15

## 2021-06-15 VITALS
HEART RATE: 47 BPM | TEMPERATURE: 97.3 F | OXYGEN SATURATION: 96 % | DIASTOLIC BLOOD PRESSURE: 80 MMHG | RESPIRATION RATE: 16 BRPM | SYSTOLIC BLOOD PRESSURE: 122 MMHG | WEIGHT: 151 LBS | HEIGHT: 65 IN | BODY MASS INDEX: 25.16 KG/M2

## 2021-06-15 DIAGNOSIS — R22.32 LOCALIZED SWELLING, MASS AND LUMP, LEFT UPPER LIMB: ICD-10-CM

## 2021-06-15 PROBLEM — H40.9 UNSPECIFIED GLAUCOMA: Chronic | Status: ACTIVE | Noted: 2021-06-13

## 2021-06-15 PROBLEM — I10 ESSENTIAL (PRIMARY) HYPERTENSION: Chronic | Status: ACTIVE | Noted: 2021-06-13

## 2021-06-15 PROBLEM — N18.9 CHRONIC KIDNEY DISEASE, UNSPECIFIED: Chronic | Status: ACTIVE | Noted: 2021-06-13

## 2021-06-15 PROBLEM — F31.9 BIPOLAR DISORDER, UNSPECIFIED: Chronic | Status: ACTIVE | Noted: 2021-06-13

## 2021-06-15 PROCEDURE — 99214 OFFICE O/P EST MOD 30 MIN: CPT | Mod: 25

## 2021-06-15 PROCEDURE — 36415 COLL VENOUS BLD VENIPUNCTURE: CPT

## 2021-06-15 RX ORDER — HALOPERIDOL 5 MG/1
5 TABLET ORAL
Refills: 0 | Status: COMPLETED | COMMUNITY
End: 2021-06-15

## 2021-06-15 RX ORDER — METRONIDAZOLE 7.5 MG/G
0.75 GEL VAGINAL
Qty: 5 | Refills: 0 | Status: COMPLETED | COMMUNITY
Start: 2019-07-08 | End: 2021-06-15

## 2021-06-15 RX ORDER — FLUCONAZOLE 150 MG/1
150 TABLET ORAL
Qty: 1 | Refills: 0 | Status: COMPLETED | COMMUNITY
Start: 2019-06-26 | End: 2021-06-15

## 2021-06-16 ENCOUNTER — TRANSCRIPTION ENCOUNTER (OUTPATIENT)
Age: 61
End: 2021-06-16

## 2021-06-16 ENCOUNTER — LABORATORY RESULT (OUTPATIENT)
Age: 61
End: 2021-06-16

## 2021-06-17 LAB
25(OH)D3 SERPL-MCNC: 28.2 NG/ML
ALBUMIN SERPL ELPH-MCNC: 3.4 G/DL
ALP BLD-CCNC: 56 U/L
ALT SERPL-CCNC: 6 U/L
ANION GAP SERPL CALC-SCNC: 11 MMOL/L
APPEARANCE: ABNORMAL
AST SERPL-CCNC: 12 U/L
BASOPHILS # BLD AUTO: 0.02 K/UL
BASOPHILS NFR BLD AUTO: 0.5 %
BILIRUB SERPL-MCNC: 0.4 MG/DL
BILIRUBIN URINE: NEGATIVE
BLOOD URINE: NEGATIVE
BUN SERPL-MCNC: 23 MG/DL
C PEPTIDE SERPL-MCNC: 3.9 NG/ML
CALCIUM SERPL-MCNC: 9.1 MG/DL
CHLORIDE SERPL-SCNC: 99 MMOL/L
CHOLEST SERPL-MCNC: 169 MG/DL
CO2 SERPL-SCNC: 26 MMOL/L
COLOR: YELLOW
CREAT SERPL-MCNC: 1.84 MG/DL
EOSINOPHIL # BLD AUTO: 0.09 K/UL
EOSINOPHIL NFR BLD AUTO: 2.2 %
ESTIMATED AVERAGE GLUCOSE: 111 MG/DL
FERRITIN SERPL-MCNC: 225 NG/ML
FOLATE SERPL-MCNC: 11.3 NG/ML
GLUCOSE QUALITATIVE U: NEGATIVE
GLUCOSE SERPL-MCNC: 90 MG/DL
HBA1C MFR BLD HPLC: 5.5 %
HCT VFR BLD CALC: 39.6 %
HDLC SERPL-MCNC: 40 MG/DL
HGB BLD-MCNC: 13.5 G/DL
IMM GRANULOCYTES NFR BLD AUTO: 0.7 %
IRON SATN MFR SERPL: 20 %
IRON SERPL-MCNC: 54 UG/DL
KETONES URINE: NORMAL
LDLC SERPL CALC-MCNC: 107 MG/DL
LEUKOCYTE ESTERASE URINE: ABNORMAL
LYMPHOCYTES # BLD AUTO: 1.5 K/UL
LYMPHOCYTES NFR BLD AUTO: 37.1 %
MAN DIFF?: NORMAL
MCHC RBC-ENTMCNC: 30.8 PG
MCHC RBC-ENTMCNC: 34.1 GM/DL
MCV RBC AUTO: 90.2 FL
MONOCYTES # BLD AUTO: 0.63 K/UL
MONOCYTES NFR BLD AUTO: 15.6 %
NEUTROPHILS # BLD AUTO: 1.77 K/UL
NEUTROPHILS NFR BLD AUTO: 43.9 %
NITRITE URINE: NEGATIVE
NONHDLC SERPL-MCNC: 128 MG/DL
PH URINE: 6
PLATELET # BLD AUTO: 221 K/UL
POTASSIUM SERPL-SCNC: 4.6 MMOL/L
PROT SERPL-MCNC: 8 G/DL
PROTEIN URINE: ABNORMAL
RBC # BLD: 4.39 M/UL
RBC # FLD: 14.7 %
SODIUM SERPL-SCNC: 137 MMOL/L
SPECIFIC GRAVITY URINE: 1.02
T4 SERPL-MCNC: 10 UG/DL
TIBC SERPL-MCNC: 265 UG/DL
TRIGL SERPL-MCNC: 110 MG/DL
TSH SERPL-ACNC: 1.97 UIU/ML
UIBC SERPL-MCNC: 211 UG/DL
UROBILINOGEN URINE: ABNORMAL
VIT B12 SERPL-MCNC: 682 PG/ML
WBC # FLD AUTO: 4.04 K/UL

## 2021-06-21 LAB
CREAT SPEC-SCNC: 300 MG/DL
MICROALBUMIN 24H UR DL<=1MG/L-MCNC: 21.4 MG/DL
MICROALBUMIN/CREAT 24H UR-RTO: 71 MG/G
URINE CULTURE <10: NORMAL

## 2021-07-02 ENCOUNTER — TRANSCRIPTION ENCOUNTER (OUTPATIENT)
Age: 61
End: 2021-07-02

## 2021-07-19 ENCOUNTER — APPOINTMENT (OUTPATIENT)
Dept: FAMILY MEDICINE | Facility: CLINIC | Age: 61
End: 2021-07-19
Payer: MEDICARE

## 2021-07-19 VITALS
WEIGHT: 154 LBS | BODY MASS INDEX: 25.66 KG/M2 | SYSTOLIC BLOOD PRESSURE: 110 MMHG | OXYGEN SATURATION: 99 % | TEMPERATURE: 97 F | DIASTOLIC BLOOD PRESSURE: 70 MMHG | HEIGHT: 65 IN | RESPIRATION RATE: 16 BRPM | HEART RATE: 46 BPM

## 2021-07-19 PROCEDURE — 99072 ADDL SUPL MATRL&STAF TM PHE: CPT

## 2021-07-19 PROCEDURE — 99214 OFFICE O/P EST MOD 30 MIN: CPT

## 2021-07-19 RX ORDER — BUPROPION HYDROCHLORIDE 75 MG/1
75 TABLET, FILM COATED ORAL TWICE DAILY
Qty: 180 | Refills: 0 | Status: COMPLETED | COMMUNITY
Start: 2019-08-06 | End: 2021-07-19

## 2021-07-20 ENCOUNTER — NON-APPOINTMENT (OUTPATIENT)
Age: 61
End: 2021-07-20

## 2021-07-23 ENCOUNTER — RX RENEWAL (OUTPATIENT)
Age: 61
End: 2021-07-23

## 2021-07-23 ENCOUNTER — APPOINTMENT (OUTPATIENT)
Dept: FAMILY MEDICINE | Facility: CLINIC | Age: 61
End: 2021-07-23

## 2021-08-17 ENCOUNTER — APPOINTMENT (OUTPATIENT)
Dept: NEPHROLOGY | Facility: CLINIC | Age: 61
End: 2021-08-17

## 2021-09-17 ENCOUNTER — LABORATORY RESULT (OUTPATIENT)
Age: 61
End: 2021-09-17

## 2021-09-17 ENCOUNTER — APPOINTMENT (OUTPATIENT)
Dept: NEPHROLOGY | Facility: CLINIC | Age: 61
End: 2021-09-17
Payer: MEDICARE

## 2021-09-17 VITALS
WEIGHT: 154 LBS | BODY MASS INDEX: 25.66 KG/M2 | OXYGEN SATURATION: 98 % | HEART RATE: 68 BPM | DIASTOLIC BLOOD PRESSURE: 84 MMHG | HEIGHT: 65 IN | SYSTOLIC BLOOD PRESSURE: 132 MMHG

## 2021-09-17 DIAGNOSIS — E66.3 OVERWEIGHT: ICD-10-CM

## 2021-09-17 DIAGNOSIS — R93.89 ABNORMAL FINDINGS ON DIAGNOSTIC IMAGING OF OTHER SPECIFIED BODY STRUCTURES: ICD-10-CM

## 2021-09-17 DIAGNOSIS — L85.3 XEROSIS CUTIS: ICD-10-CM

## 2021-09-17 DIAGNOSIS — R73.03 PREDIABETES.: ICD-10-CM

## 2021-09-17 DIAGNOSIS — Z86.39 PERSONAL HISTORY OF OTHER ENDOCRINE, NUTRITIONAL AND METABOLIC DISEASE: ICD-10-CM

## 2021-09-17 DIAGNOSIS — Z87.898 PERSONAL HISTORY OF OTHER SPECIFIED CONDITIONS: ICD-10-CM

## 2021-09-17 DIAGNOSIS — M79.604 PAIN IN RIGHT LEG: ICD-10-CM

## 2021-09-17 DIAGNOSIS — K21.9 GASTRO-ESOPHAGEAL REFLUX DISEASE W/OUT ESOPHAGITIS: ICD-10-CM

## 2021-09-17 DIAGNOSIS — Z11.59 ENCOUNTER FOR SCREENING FOR OTHER VIRAL DISEASES: ICD-10-CM

## 2021-09-17 DIAGNOSIS — Z12.11 ENCOUNTER FOR SCREENING FOR MALIGNANT NEOPLASM OF COLON: ICD-10-CM

## 2021-09-17 DIAGNOSIS — Z86.19 PERSONAL HISTORY OF OTHER INFECTIOUS AND PARASITIC DISEASES: ICD-10-CM

## 2021-09-17 DIAGNOSIS — Z86.69 PERSONAL HISTORY OF OTHER DISEASES OF THE NERVOUS SYSTEM AND SENSE ORGANS: ICD-10-CM

## 2021-09-17 DIAGNOSIS — Z11.4 ENCOUNTER FOR SCREENING FOR HUMAN IMMUNODEFICIENCY VIRUS [HIV]: ICD-10-CM

## 2021-09-17 DIAGNOSIS — Z71.6 TOBACCO ABUSE COUNSELING: ICD-10-CM

## 2021-09-17 DIAGNOSIS — E87.5 HYPERKALEMIA: ICD-10-CM

## 2021-09-17 DIAGNOSIS — Z78.0 ASYMPTOMATIC MENOPAUSAL STATE: ICD-10-CM

## 2021-09-17 DIAGNOSIS — R94.4 ABNORMAL RESULTS OF KIDNEY FUNCTION STUDIES: ICD-10-CM

## 2021-09-17 DIAGNOSIS — H26.9 UNSPECIFIED CATARACT: ICD-10-CM

## 2021-09-17 DIAGNOSIS — M79.605 PAIN IN RIGHT LEG: ICD-10-CM

## 2021-09-17 DIAGNOSIS — Z87.42 PERSONAL HISTORY OF OTHER DISEASES OF THE FEMALE GENITAL TRACT: ICD-10-CM

## 2021-09-17 DIAGNOSIS — R60.0 LOCALIZED EDEMA: ICD-10-CM

## 2021-09-17 DIAGNOSIS — Z92.29 PERSONAL HISTORY OF OTHER DRUG THERAPY: ICD-10-CM

## 2021-09-17 PROCEDURE — 99214 OFFICE O/P EST MOD 30 MIN: CPT | Mod: 25

## 2021-09-17 PROCEDURE — 36415 COLL VENOUS BLD VENIPUNCTURE: CPT

## 2021-09-17 RX ORDER — AMLODIPINE BESYLATE 5 MG/1
5 TABLET ORAL DAILY
Qty: 90 | Refills: 3 | Status: DISCONTINUED | COMMUNITY
Start: 2018-05-31 | End: 2021-09-17

## 2021-09-17 RX ORDER — NETARSUDIL 0.2 MG/ML
0.02 SOLUTION/ DROPS OPHTHALMIC; TOPICAL DAILY
Qty: 2.5 | Refills: 5 | Status: DISCONTINUED | COMMUNITY
Start: 2020-03-30 | End: 2021-09-17

## 2021-09-17 RX ORDER — PREDNISOLONE ACETATE 10 MG/ML
1 SUSPENSION/ DROPS OPHTHALMIC
Qty: 5 | Refills: 0 | Status: DISCONTINUED | COMMUNITY
Start: 2021-09-15 | End: 2021-09-17

## 2021-09-17 RX ORDER — TIMOLOL MALEATE 5 MG/ML
0.5 SOLUTION OPHTHALMIC
Qty: 15 | Refills: 0 | Status: DISCONTINUED | COMMUNITY
Start: 2021-07-23 | End: 2021-09-17

## 2021-09-17 RX ORDER — CEPHALEXIN 500 MG/1
500 CAPSULE ORAL
Qty: 6 | Refills: 0 | Status: DISCONTINUED | COMMUNITY
Start: 2021-06-14 | End: 2021-09-17

## 2021-09-17 RX ORDER — TRAVOPROST 0.04 MG/ML
0 SOLUTION OPHTHALMIC
Qty: 1 | Refills: 5 | Status: DISCONTINUED | COMMUNITY
Start: 2020-05-18 | End: 2021-09-17

## 2021-09-17 RX ORDER — BRIMONIDINE TARTRATE, TIMOLOL MALEATE 2; 5 MG/ML; MG/ML
0.2-0.5 SOLUTION/ DROPS OPHTHALMIC TWICE DAILY
Qty: 10 | Refills: 5 | Status: DISCONTINUED | COMMUNITY
Start: 2020-03-30 | End: 2021-09-17

## 2021-09-17 RX ORDER — HALOPERIDOL DECANOATE 100 MG/ML
100 INJECTION INTRAMUSCULAR
Qty: 2 | Refills: 0 | Status: DISCONTINUED | COMMUNITY
Start: 2021-08-17 | End: 2021-09-17

## 2021-09-17 RX ORDER — PYRIDOXINE HCL (VITAMIN B6) 100 MG
18 TABLET ORAL DAILY
Qty: 30 | Refills: 3 | Status: DISCONTINUED | COMMUNITY
Start: 2017-02-11 | End: 2021-09-17

## 2021-09-17 RX ORDER — LATANOPROST/PF 0.005 %
0.01 DROPS OPHTHALMIC (EYE)
Qty: 8 | Refills: 0 | Status: DISCONTINUED | COMMUNITY
Start: 2020-05-27 | End: 2021-09-17

## 2021-09-17 NOTE — ASSESSMENT
[FreeTextEntry1] : A : CKD - 3 , Slowly progressive ,  LILIA Resolved, \par \par HTN,\par \par P : D.C ACEi, Amlodipine Held ( ? Intolerant )\par \par On BB,   CKD - W.U , Recent Labs & Meds Reviewed, \par \par BP : 132/84, \par \par \par C/W Current management, \par \par Maintain F.U - HTN & CKD Management,

## 2021-09-17 NOTE — HISTORY OF PRESENT ILLNESS
[FreeTextEntry1] : S/P CKD Evaluation,\par \par On Haldol Injection Q month,\par \par + HTN, ? Type - 2 Diabetic,

## 2021-09-17 NOTE — PHYSICAL EXAM
[General Appearance - Alert] : alert [General Appearance - In No Acute Distress] : in no acute distress [Sclera] : the sclera and conjunctiva were normal [PERRL With Normal Accommodation] : pupils were equal in size, round, and reactive to light [Extraocular Movements] : extraocular movements were intact [Outer Ear] : the ears and nose were normal in appearance [Oropharynx] : the oropharynx was normal [Neck Appearance] : the appearance of the neck was normal [Neck Cervical Mass (___cm)] : no neck mass was observed [Jugular Venous Distention Increased] : there was no jugular-venous distention [Thyroid Diffuse Enlargement] : the thyroid was not enlarged [Thyroid Nodule] : there were no palpable thyroid nodules [Auscultation Breath Sounds / Voice Sounds] : lungs were clear to auscultation bilaterally [Heart Rate And Rhythm] : heart rate was normal and rhythm regular [Heart Sounds Gallop] : no gallops [Heart Sounds] : normal S1 and S2 [Murmurs] : no murmurs [Heart Sounds Pericardial Friction Rub] : no pericardial rub [Full Pulse] : the pedal pulses are present [Edema] : there was no peripheral edema [Abdomen Soft] : soft [Bowel Sounds] : normal bowel sounds [Abdomen Tenderness] : non-tender [Abdomen Mass (___ Cm)] : no abdominal mass palpated [Cervical Lymph Nodes Enlarged Posterior Bilaterally] : posterior cervical [Cervical Lymph Nodes Enlarged Anterior Bilaterally] : anterior cervical [Supraclavicular Lymph Nodes Enlarged Bilaterally] : supraclavicular [Axillary Lymph Nodes Enlarged Bilaterally] : axillary [Femoral Lymph Nodes Enlarged Bilaterally] : femoral [Inguinal Lymph Nodes Enlarged Bilaterally] : inguinal [No CVA Tenderness] : no ~M costovertebral angle tenderness [No Spinal Tenderness] : no spinal tenderness [Abnormal Walk] : normal gait [Nail Clubbing] : no clubbing  or cyanosis of the fingernails [Musculoskeletal - Swelling] : no joint swelling seen [Motor Tone] : muscle strength and tone were normal [Skin Color & Pigmentation] : normal skin color and pigmentation [] : no rash [Skin Turgor] : normal skin turgor [Deep Tendon Reflexes (DTR)] : deep tendon reflexes were 2+ and symmetric [Sensation] : the sensory exam was normal to light touch and pinprick [No Focal Deficits] : no focal deficits [Oriented To Time, Place, And Person] : oriented to person, place, and time [Impaired Insight] : insight and judgment were intact [Affect] : the affect was normal

## 2021-09-20 ENCOUNTER — APPOINTMENT (OUTPATIENT)
Dept: OBGYN | Facility: CLINIC | Age: 61
End: 2021-09-20
Payer: MEDICARE

## 2021-09-20 ENCOUNTER — LABORATORY RESULT (OUTPATIENT)
Age: 61
End: 2021-09-20

## 2021-09-20 VITALS
WEIGHT: 154 LBS | BODY MASS INDEX: 25.66 KG/M2 | HEIGHT: 65 IN | SYSTOLIC BLOOD PRESSURE: 128 MMHG | DIASTOLIC BLOOD PRESSURE: 80 MMHG

## 2021-09-20 DIAGNOSIS — B96.89 ACUTE VAGINITIS: ICD-10-CM

## 2021-09-20 DIAGNOSIS — Z01.419 ENCOUNTER FOR GYNECOLOGICAL EXAMINATION (GENERAL) (ROUTINE) W/OUT ABNORMAL FINDINGS: ICD-10-CM

## 2021-09-20 DIAGNOSIS — N76.0 ACUTE VAGINITIS: ICD-10-CM

## 2021-09-20 LAB
25(OH)D3 SERPL-MCNC: 22.8 NG/ML
ALBUMIN SERPL ELPH-MCNC: 3.5 G/DL
ANION GAP SERPL CALC-SCNC: 10 MMOL/L
APPEARANCE: CLEAR
BACTERIA: NEGATIVE
BASOPHILS # BLD AUTO: 0.01 K/UL
BASOPHILS NFR BLD AUTO: 0.4 %
BILIRUB UR QL STRIP: NORMAL
BILIRUBIN URINE: NEGATIVE
BLOOD URINE: NEGATIVE
BUN SERPL-MCNC: 13 MG/DL
CALCIUM SERPL-MCNC: 9.2 MG/DL
CALCIUM SERPL-MCNC: 9.2 MG/DL
CHLORIDE SERPL-SCNC: 102 MMOL/L
CO2 SERPL-SCNC: 29 MMOL/L
COLOR: YELLOW
CREAT SERPL-MCNC: 1.59 MG/DL
CREAT SPEC-SCNC: 112 MG/DL
CREAT/PROT UR: 0.1 RATIO
EOSINOPHIL # BLD AUTO: 0.05 K/UL
EOSINOPHIL NFR BLD AUTO: 2 %
GLUCOSE QUALITATIVE U: NEGATIVE
GLUCOSE SERPL-MCNC: 79 MG/DL
GLUCOSE UR-MCNC: NORMAL
HCG UR QL: 1 EU/DL
HCT VFR BLD CALC: 36.9 %
HGB BLD-MCNC: 11.8 G/DL
HGB UR QL STRIP.AUTO: NORMAL
HYALINE CASTS: 0 /LPF
IMM GRANULOCYTES NFR BLD AUTO: 0 %
KETONES UR-MCNC: NORMAL
KETONES URINE: NEGATIVE
LEUKOCYTE ESTERASE UR QL STRIP: NORMAL
LEUKOCYTE ESTERASE URINE: NEGATIVE
LYMPHOCYTES # BLD AUTO: 0.79 K/UL
LYMPHOCYTES NFR BLD AUTO: 31.1 %
MAN DIFF?: NORMAL
MCHC RBC-ENTMCNC: 30.6 PG
MCHC RBC-ENTMCNC: 32 GM/DL
MCV RBC AUTO: 95.6 FL
MICROSCOPIC-UA: NORMAL
MONOCYTES # BLD AUTO: 0.44 K/UL
MONOCYTES NFR BLD AUTO: 17.3 %
NEUTROPHILS # BLD AUTO: 1.25 K/UL
NEUTROPHILS NFR BLD AUTO: 49.2 %
NITRITE UR QL STRIP: NORMAL
NITRITE URINE: NEGATIVE
PARATHYROID HORMONE INTACT: 60 PG/ML
PH UR STRIP: 6
PH URINE: 6.5
PHOSPHATE SERPL-MCNC: 3.1 MG/DL
PLATELET # BLD AUTO: 170 K/UL
POTASSIUM SERPL-SCNC: 4.5 MMOL/L
PROT UR STRIP-MCNC: NORMAL
PROT UR-MCNC: 15 MG/DL
PROTEIN URINE: NEGATIVE
RBC # BLD: 3.86 M/UL
RBC # FLD: 15.9 %
RED BLOOD CELLS URINE: 2 /HPF
SODIUM SERPL-SCNC: 141 MMOL/L
SP GR UR STRIP: 1.01
SPECIFIC GRAVITY URINE: 1.01
SQUAMOUS EPITHELIAL CELLS: 1 /HPF
UROBILINOGEN URINE: NORMAL
WBC # FLD AUTO: 2.54 K/UL
WHITE BLOOD CELLS URINE: 1 /HPF

## 2021-09-20 PROCEDURE — 81000 URINALYSIS NONAUTO W/SCOPE: CPT

## 2021-09-20 PROCEDURE — G0101: CPT

## 2021-09-20 NOTE — HISTORY OF PRESENT ILLNESS
[Menarche Age: ____] : age at menarche was [unfilled] [Menopause Age: ____] : age at menopause was [unfilled] [N] : Patient is not sexually active [PGHxTotal] : 4 [[facility]] : 4 [PGHxPremature] : 0 [PGHxAbortions] : 0 [[facility] Centeriving] : 3 [PGHxABInduced] : 0 [PGHxEctopic] : 0 [PGHxABSpont] : 0 [PGHxMultBirths] : 0

## 2021-09-20 NOTE — PHYSICAL EXAM
[Appropriately responsive] : appropriately responsive [Alert] : alert [No Acute Distress] : no acute distress [Soft] : soft [Non-tender] : non-tender [Non-distended] : non-distended [No HSM] : No HSM [No Lesions] : no lesions [Oriented x3] : oriented x3 [No Mass] : no mass [Examination Of The Breasts] : a normal appearance [No Masses] : no breast masses were palpable [Labia Majora] : normal [Labia Minora] : normal [Discharge] : a  ~M vaginal discharge was present [Moderate] : moderate [Foul Smelling] : foul smelling [Normal] : normal [Uterine Adnexae] : normal [No Tenderness] : no tenderness [Nl Sphincter Tone] : normal sphincter tone

## 2021-09-23 ENCOUNTER — TRANSCRIPTION ENCOUNTER (OUTPATIENT)
Age: 61
End: 2021-09-23

## 2021-09-24 LAB
APPEARANCE: CLEAR
BACTERIA UR CULT: NORMAL
BACTERIA: NEGATIVE
BILIRUBIN URINE: NEGATIVE
BLOOD URINE: NORMAL
CANDIDA VAG CYTO: NOT DETECTED
COLOR: YELLOW
CYTOLOGY CVX/VAG DOC THIN PREP: NORMAL
G VAGINALIS+PREV SP MTYP VAG QL MICRO: NOT DETECTED
GLUCOSE QUALITATIVE U: NEGATIVE
HPV HIGH+LOW RISK DNA PNL CVX: DETECTED
HYALINE CASTS: 2 /LPF
KETONES URINE: NORMAL
LEUKOCYTE ESTERASE URINE: ABNORMAL
MICROSCOPIC-UA: NORMAL
NITRITE URINE: NEGATIVE
PH URINE: 6
PROTEIN URINE: NEGATIVE
RED BLOOD CELLS URINE: 3 /HPF
SPECIFIC GRAVITY URINE: 1.01
SQUAMOUS EPITHELIAL CELLS: 7 /HPF
T VAGINALIS VAG QL WET PREP: NOT DETECTED
UROBILINOGEN URINE: NORMAL
WHITE BLOOD CELLS URINE: 8 /HPF

## 2021-09-28 ENCOUNTER — NON-APPOINTMENT (OUTPATIENT)
Age: 61
End: 2021-09-28

## 2021-10-15 ENCOUNTER — APPOINTMENT (OUTPATIENT)
Dept: FAMILY MEDICINE | Facility: CLINIC | Age: 61
End: 2021-10-15

## 2021-11-15 ENCOUNTER — APPOINTMENT (OUTPATIENT)
Dept: FAMILY MEDICINE | Facility: CLINIC | Age: 61
End: 2021-11-15
Payer: MEDICARE

## 2021-11-15 VITALS
HEART RATE: 60 BPM | HEIGHT: 65 IN | WEIGHT: 154 LBS | BODY MASS INDEX: 25.66 KG/M2 | DIASTOLIC BLOOD PRESSURE: 76 MMHG | RESPIRATION RATE: 16 BRPM | SYSTOLIC BLOOD PRESSURE: 110 MMHG | TEMPERATURE: 97.6 F | OXYGEN SATURATION: 90 %

## 2021-11-15 DIAGNOSIS — Z78.0 ASYMPTOMATIC MENOPAUSAL STATE: ICD-10-CM

## 2021-11-15 PROCEDURE — 99214 OFFICE O/P EST MOD 30 MIN: CPT | Mod: 25

## 2021-11-15 PROCEDURE — 36415 COLL VENOUS BLD VENIPUNCTURE: CPT

## 2021-11-15 RX ORDER — METOPROLOL SUCCINATE 50 MG/1
50 TABLET, EXTENDED RELEASE ORAL
Qty: 90 | Refills: 3 | Status: COMPLETED | COMMUNITY
Start: 2019-03-01 | End: 2021-11-15

## 2021-11-15 RX ORDER — BENZTROPINE MESYLATE 2 MG/1
2 TABLET ORAL
Refills: 0 | Status: COMPLETED | COMMUNITY
Start: 2018-11-19 | End: 2021-11-15

## 2021-11-16 LAB
25(OH)D3 SERPL-MCNC: 22.4 NG/ML
ALBUMIN SERPL ELPH-MCNC: 3.4 G/DL
ALP BLD-CCNC: 57 U/L
ALT SERPL-CCNC: <5 U/L
ANION GAP SERPL CALC-SCNC: 9 MMOL/L
APPEARANCE: CLEAR
AST SERPL-CCNC: 16 U/L
BASOPHILS # BLD AUTO: 0.01 K/UL
BASOPHILS NFR BLD AUTO: 0.3 %
BILIRUB SERPL-MCNC: 0.3 MG/DL
BILIRUBIN URINE: NEGATIVE
BLOOD URINE: NEGATIVE
BUN SERPL-MCNC: 16 MG/DL
C PEPTIDE SERPL-MCNC: 1.6 NG/ML
CALCIUM SERPL-MCNC: 9.3 MG/DL
CHLORIDE SERPL-SCNC: 96 MMOL/L
CHOLEST SERPL-MCNC: 171 MG/DL
CO2 SERPL-SCNC: 26 MMOL/L
COLOR: YELLOW
COVID-19 NUCLEOCAPSID  GAM ANTIBODY INTERPRETATION: NEGATIVE
COVID-19 SPIKE DOMAIN ANTIBODY INTERPRETATION: POSITIVE
CREAT SERPL-MCNC: 1.45 MG/DL
CREAT SPEC-SCNC: 120 MG/DL
EOSINOPHIL # BLD AUTO: 0.06 K/UL
EOSINOPHIL NFR BLD AUTO: 2 %
ESTIMATED AVERAGE GLUCOSE: 100 MG/DL
FERRITIN SERPL-MCNC: 299 NG/ML
FOLATE SERPL-MCNC: 8.7 NG/ML
GLUCOSE QUALITATIVE U: NEGATIVE
GLUCOSE SERPL-MCNC: 65 MG/DL
HBA1C MFR BLD HPLC: 5.1 %
HCT VFR BLD CALC: 37.5 %
HDLC SERPL-MCNC: 45 MG/DL
HGB BLD-MCNC: 13 G/DL
IMM GRANULOCYTES NFR BLD AUTO: 0.7 %
IRON SATN MFR SERPL: 20 %
IRON SERPL-MCNC: 62 UG/DL
KETONES URINE: NEGATIVE
LDLC SERPL CALC-MCNC: 106 MG/DL
LEUKOCYTE ESTERASE URINE: NEGATIVE
LYMPHOCYTES # BLD AUTO: 1.07 K/UL
LYMPHOCYTES NFR BLD AUTO: 35.9 %
MAN DIFF?: NORMAL
MCHC RBC-ENTMCNC: 31.5 PG
MCHC RBC-ENTMCNC: 34.7 GM/DL
MCV RBC AUTO: 90.8 FL
MICROALBUMIN 24H UR DL<=1MG/L-MCNC: <1.2 MG/DL
MICROALBUMIN/CREAT 24H UR-RTO: NORMAL MG/G
MONOCYTES # BLD AUTO: 0.52 K/UL
MONOCYTES NFR BLD AUTO: 17.4 %
NEUTROPHILS # BLD AUTO: 1.3 K/UL
NEUTROPHILS NFR BLD AUTO: 43.7 %
NITRITE URINE: NEGATIVE
NONHDLC SERPL-MCNC: 126 MG/DL
PH URINE: 5.5
PLATELET # BLD AUTO: 192 K/UL
POTASSIUM SERPL-SCNC: 5.4 MMOL/L
PROT SERPL-MCNC: 8 G/DL
PROTEIN URINE: NEGATIVE
RBC # BLD: 4.13 M/UL
RBC # FLD: 14.6 %
SARS-COV-2 AB SERPL IA-ACNC: >250 U/ML
SARS-COV-2 AB SERPL QL IA: 0.07 INDEX
SODIUM SERPL-SCNC: 131 MMOL/L
SPECIFIC GRAVITY URINE: 1.01
T4 SERPL-MCNC: 8.8 UG/DL
TIBC SERPL-MCNC: 315 UG/DL
TRIGL SERPL-MCNC: 97 MG/DL
TSH SERPL-ACNC: 1.84 UIU/ML
UIBC SERPL-MCNC: 253 UG/DL
UROBILINOGEN URINE: NORMAL
VIT B12 SERPL-MCNC: 516 PG/ML
WBC # FLD AUTO: 2.98 K/UL

## 2022-02-18 ENCOUNTER — APPOINTMENT (OUTPATIENT)
Dept: NEPHROLOGY | Facility: CLINIC | Age: 62
End: 2022-02-18

## 2022-04-19 ENCOUNTER — RX RENEWAL (OUTPATIENT)
Age: 62
End: 2022-04-19

## 2022-04-19 DIAGNOSIS — B35.3 TINEA PEDIS: ICD-10-CM

## 2022-04-19 RX ORDER — KETOCONAZOLE 20 MG/G
2 CREAM TOPICAL TWICE DAILY
Qty: 60 | Refills: 5 | Status: ACTIVE | COMMUNITY
Start: 2020-03-30 | End: 1900-01-01

## 2022-04-21 ENCOUNTER — RX RENEWAL (OUTPATIENT)
Age: 62
End: 2022-04-21

## 2022-10-28 ENCOUNTER — EMERGENCY (EMERGENCY)
Facility: HOSPITAL | Age: 62
LOS: 1 days | Discharge: HOME | End: 2022-10-28
Payer: MEDICARE

## 2022-10-28 VITALS
RESPIRATION RATE: 16 BRPM | TEMPERATURE: 98 F | DIASTOLIC BLOOD PRESSURE: 98 MMHG | HEIGHT: 66 IN | HEART RATE: 69 BPM | OXYGEN SATURATION: 96 % | SYSTOLIC BLOOD PRESSURE: 192 MMHG

## 2022-10-28 PROCEDURE — 99285 EMERGENCY DEPT VISIT HI MDM: CPT

## 2022-10-28 PROCEDURE — 99053 MED SERV 10PM-8AM 24 HR FAC: CPT

## 2022-10-28 PROCEDURE — 90792 PSYCH DIAG EVAL W/MED SRVCS: CPT

## 2022-10-28 NOTE — ED ADULT TRIAGE NOTE - LOCATION:
[facility] Progress Note        Antibiotics:  Anti-Infectives (From admission, onward)    Ordered     Dose/Rate Route Frequency Start Stop    07/21/20 0859  ertapenem (INVanz) 1 g/100 mL 0.9% NS VTB (mbp)     Ordering Provider:  Fredrick Mccloud MD    1 g  over 30 Minutes Intravenous Every 24 Hours 07/21/20 0945 07/28/20 0944    07/19/20 0901  DAPTOmycin (CUBICIN) 300 mg in sodium chloride 0.9 % 50 mL IVPB     [facility] reviewed the order on 07/21/20 0802.   Ordering Provider:  David Dorsey APRN    4 mg/kg × 70.1 kg (Adjusted)  100 mL/hr over 30 Minutes Intravenous Every 24 Hours 07/19/20 1000 07/26/20 0959    07/18/20 1523  vancomycin 1750 mg/500 mL 0.9% NS IVPB ([facility])     Ordering Provider:  Golden Hedrick MD    20 mg/kg × 86.2 kg Intravenous Once 07/18/20 1525 07/18/20 1723    07/18/20 1523  piperacillin-tazobactam (ZOSYN) 3.375 g in iso-osmotic dextrose 50 ml (premix)     Ordering Provider:  Golden Hedrick MD    3.375 g Intravenous Once 07/18/20 1525 07/18/20 1723          CC: labial infection    HPI:    Ms. Margarita Aragon is a 38 y.o. female is being evaluated for sepsis and right labial abscess.  She has a past medical history of hidradenitis and has followed with [facility] in past; polycystic ovarian syndrome, fatty liver, obesity and remote tobacco abuse (quit in 2006).  She presented to the ED on 7/18 with complaints of right labial lesion that was approximately pea-sized that originally appeared this past Friday.  Pain progressively worsened on Saturday and lesion progressed to approximately grape sized.  Due to her pain she presented to the ED for further evaluation and treatment on the morning of 7/18.  Lesion was lanced and patient was discharged home on Bactrim DS.  Later that afternoon/evening the patient spiked a fever of 102 as well as nausea and vomiting with worsening right groin pain.  She presented back to the ED where she was diagnosed with sepsis, blood cultures were collected and  she was started on Vanco and Zosyn.  She is admitted for further evaluation and treatment.     Since admission, she has had T-max of 102.1 with some hypotension noted.  Most recent labs show WBC of 21.05 with a neutrophilia of 87.6%.  Procalcitonin was 0.07 and lactate was 0.7.  She had a lymphocyte percent of 4.4 with an absolute lymphocyte count of 0.92.  CMP was unremarkable.  UA was not impressive for UTI.  Blood cultures have been collected and are in progress.  Chest x-ray was collected and showed no acute cardiopulmonary process.   She has worked as an OR circulator      7/21/20 feeling better and wants home;  She prefers IV abx as she says she doesn't tolerate oral antibiotics well in general and also given improvents on IV agents; Pain at labia has been less intense, nonrad, worse with pressure, better since admit and with abx, 2-3 / 10 at present and not progressive;  nonradiating     No HA/photophobia or neck stiffness; no rash;  No dysuria/hematuria or pyuria/ no abd pain;  No N/V/D and no SOB/cough     She got itching/flushed with vancomycin infusions      ROS:      7/21/20 No f/c/s. No n/v/d. No rash. No new ADR to Abx.     Constitutional--  fevers trended down;  Appetite good, and no malaise. No fatigue.  HEENT-- No new vision, hearing or throat complaints.  No epistaxis or oral sores.  Denies odynophagia or dysphagia. No headache, photophobia or neck stiffness.  CV-- No chest pain, palpitation or syncope  Resp-- No SOB/cough/Hemoptysis  GI- No nausea, vomiting, or diarrhea.  No hematochezia, melena, or hematemesis. Denies jaundice or chronic liver disease.  --positive for right labial abscess.  Lymph- no swollen lymph nodes in neck/axilla or groin.   Heme- No active bruising or bleeding; no Hx of DVT or PE.  MS-- no swelling or pain in the bones or joints of arms/legs.  No new back pain.  Neuro-- No acute focal weakness or numbness in the arms or legs.  No seizures.  Skin--No rashes or  "lesions     All other systems negative for acute complaints on a 12 system ROS       PE:   /81 (BP Location: Right arm, Patient Position: Lying)   Pulse 59   Temp 98.1 °F (36.7 °C) (Oral)   Resp 16   Ht 167.6 cm (66\")   Wt 86.2 kg (190 lb)   LMP 07/03/2020   SpO2 96%   BMI 30.67 kg/m²     GENERAL: Awake and alert, in no acute distress.   HEENT: Normocephalic, atraumatic.  PERRL. EOMI. No conjunctival injection. No icterus. Oropharynx clear without evidence of thrush or exudate. No evidence of peridontal disease.    NECK: Supple without nuchal rigidity. No mass.  LYMPH: No cervical, axillary lymphadenopathy.  There is some inguinal lymphadenopathy on the right side.  HEART: RRR; No murmur, rubs, gallops.   LUNGS: Clear to auscultation bilaterally without wheezing, rales, rhonchi. Normal respiratory effort. Nonlabored. No dullness.  ABDOMEN: Soft, nontender, nondistended. Positive bowel sounds. No rebound or guarding. NO mass or HSM.  EXT:  No cyanosis, clubbing or edema. No cord.  : Right labia is swollen 2-3+ edema with crusted area at the lower aspect of the labia.  The area is erythematous and is tender to touch but less so.   No crepitus or bullae; no fluctuance  MSK: FROM without joint effusions noted arms/legs.    SKIN: Warm and dry without cutaneous eruptions on Inspection/palpation.    NEURO: Oriented to PPT. No focal deficits on motor/sensory exam at arms/legs.  PSYCHIATRIC: Normal insight and judgement. Cooperative with PE     No peripheral stigmata/phenomena of IE      Laboratory Data    Results from last 7 days   Lab Units 07/20/20  0832 07/19/20  0651 07/18/20  1559   WBC 10*3/mm3 7.01 11.65* 21.05*   HEMOGLOBIN g/dL 12.0 11.5* 13.0   HEMATOCRIT % 36.7 35.0 37.7   PLATELETS 10*3/mm3 270 227 298     Results from last 7 days   Lab Units 07/20/20  0832   SODIUM mmol/L 138   POTASSIUM mmol/L 3.8   CHLORIDE mmol/L 107   CO2 mmol/L 21.0*   BUN mg/dL 6   CREATININE mg/dL 0.58   GLUCOSE mg/dL " 169*   CALCIUM mg/dL 8.7     Results from last 7 days   Lab Units 07/20/20  0832   ALK PHOS U/L 75   BILIRUBIN mg/dL 0.4   ALT (SGPT) U/L 35*   AST (SGOT) U/L 35*     Results from last 7 days   Lab Units 07/20/20  0832   SED RATE mm/hr 17     Results from last 7 days   Lab Units 07/20/20  0832   CRP mg/dL 3.89*       Estimated Creatinine Clearance: 147 mL/min (by C-G formula based on SCr of 0.58 mg/dL).      Microbiology:      Radiology:  Imaging Results (Last 72 Hours)     Procedure Component Value Units Date/Time    XR Chest 1 View [[id number]] Collected:  07/18/20 1557     Updated:  07/19/20 1537    Narrative:          EXAMINATION: XR CHEST 1 VW - 07/18/2020     INDICATION: Fever      COMPARISON: NONE     FINDINGS: Portable chest reveals cardiac and mediastinal silhouettes  within normal limits. The lung fields are clear. No focal parenchymal  opacification is present. No pleural effusion or pneumothorax. The bony  structures are unremarkable. The pulmonary vascularity is within normal  limits.         Impression:       No acute cardiopulmonary disease.     DICTATED:   07/18/2020  EDITED/ls :   07/18/2020      This report was finalized on 7/19/2020 3:34 PM by Dr. Rocio Weiss MD.               Impression:     1. Acute Sepsis presenting with fever, leukocytosis, neutrophilia and labial abscess; sepsis parameters improving and no new focus of infection at present;  Monitor for other potential foci  2. Acute Right labial abscess- High risk for Gm+ organisms with Hx of hidradenitis  but keep broad coverage until further culture data; taper once more culture data  3. Polycystic ovarian syndrome; Hidradenitis  4. Red man syndrome- RN and patient reports that during the last 2 doses of vancomycin the patient has become erythematous, hot and felt like she is having an allergic reaction.  She was treated with Benadryl both times. Vancomycin stopped  5. Obesity  6. Remote history of tobacco abuse    PLAN:  1.   Left arm; daptomycin 4 mg/kg IV daily  2.  Invanz IV  3. Continue to follow cultures and sensitivity reports and adjust antibiotics accordingly  4. Obtain abscess cultures-these will be antibiotic modified at this point but are still worth obtaining  5. Continue supportive care     --check/ review labs, cultures and scans  --Hx per nursing  --d/w micro  --highly complex set of issues with high risk for further serious morbidity and other serious sequelae  --she will need to go back to dermatology for management of hidradenitis  --d/w Dr Campuzano    **I anticipate home and coming to our office daily for IV antibiotics (daptomycin/Invanz) and f/u with me wed in office;  D/w Dr Campuzano    **labile BP overnight although feels better overall and  with less erythema and no new sequelae otherwise;  Cx with bacillus sp which likely to be skin contaminant/colonizer rather than pathogen         Fredrick Mccloud MD  7/21/2020

## 2022-10-28 NOTE — ED ADULT NURSE NOTE - CHIEF COMPLAINT QUOTE
BIBEMS for medical clearance for emergency housing placement at [address] [place] [address]. Patient called DSS and already got placement until Monday however needs medical clearance and negative COVID test prior to arrival. Offers no complaints at this time.

## 2022-10-28 NOTE — ED ADULT TRIAGE NOTE - CHIEF COMPLAINT QUOTE
BIBEMS for medical clearance for emergency housing placement at [address] [place] [address]. Patient called DSS and already got placement until Monday however needs medical clearance and negative COVID test prior to arrival. Offers no complaints at this time.

## 2022-10-29 DIAGNOSIS — F31.9 BIPOLAR DISORDER, UNSPECIFIED: ICD-10-CM

## 2022-10-29 DIAGNOSIS — H26.9 UNSPECIFIED CATARACT: Chronic | ICD-10-CM

## 2022-10-29 LAB
ALBUMIN SERPL ELPH-MCNC: 3.7 G/DL — SIGNIFICANT CHANGE UP (ref 3.3–5.2)
ALP SERPL-CCNC: 37 U/L — LOW (ref 40–120)
ALT FLD-CCNC: 8 U/L — SIGNIFICANT CHANGE UP
AMPHET UR-MCNC: NEGATIVE — SIGNIFICANT CHANGE UP
ANION GAP SERPL CALC-SCNC: 10 MMOL/L — SIGNIFICANT CHANGE UP (ref 5–17)
APAP SERPL-MCNC: <3 UG/ML — LOW (ref 10–26)
APPEARANCE UR: CLEAR — SIGNIFICANT CHANGE UP
AST SERPL-CCNC: 27 U/L — SIGNIFICANT CHANGE UP
BACTERIA # UR AUTO: ABNORMAL
BARBITURATES UR SCN-MCNC: NEGATIVE — SIGNIFICANT CHANGE UP
BASOPHILS # BLD AUTO: 0 K/UL — SIGNIFICANT CHANGE UP (ref 0–0.2)
BASOPHILS NFR BLD AUTO: 0 % — SIGNIFICANT CHANGE UP (ref 0–2)
BENZODIAZ UR-MCNC: NEGATIVE — SIGNIFICANT CHANGE UP
BILIRUB SERPL-MCNC: 0.5 MG/DL — SIGNIFICANT CHANGE UP (ref 0.4–2)
BILIRUB UR-MCNC: NEGATIVE — SIGNIFICANT CHANGE UP
BUN SERPL-MCNC: 11.9 MG/DL — SIGNIFICANT CHANGE UP (ref 8–20)
BURR CELLS BLD QL SMEAR: PRESENT — SIGNIFICANT CHANGE UP
CALCIUM SERPL-MCNC: 8.9 MG/DL — SIGNIFICANT CHANGE UP (ref 8.4–10.5)
CHLORIDE SERPL-SCNC: 106 MMOL/L — SIGNIFICANT CHANGE UP (ref 96–108)
CO2 SERPL-SCNC: 25 MMOL/L — SIGNIFICANT CHANGE UP (ref 22–29)
COCAINE METAB.OTHER UR-MCNC: NEGATIVE — SIGNIFICANT CHANGE UP
COLOR SPEC: YELLOW — SIGNIFICANT CHANGE UP
CREAT SERPL-MCNC: 1.41 MG/DL — HIGH (ref 0.5–1.3)
DACRYOCYTES BLD QL SMEAR: SLIGHT — SIGNIFICANT CHANGE UP
DIFF PNL FLD: ABNORMAL
EGFR: 42 ML/MIN/1.73M2 — LOW
EOSINOPHIL # BLD AUTO: 0.03 K/UL — SIGNIFICANT CHANGE UP (ref 0–0.5)
EOSINOPHIL NFR BLD AUTO: 0.9 % — SIGNIFICANT CHANGE UP (ref 0–6)
EPI CELLS # UR: SIGNIFICANT CHANGE UP
ETHANOL SERPL-MCNC: <10 MG/DL — SIGNIFICANT CHANGE UP (ref 0–9)
GIANT PLATELETS BLD QL SMEAR: PRESENT — SIGNIFICANT CHANGE UP
GLUCOSE SERPL-MCNC: 72 MG/DL — SIGNIFICANT CHANGE UP (ref 70–99)
GLUCOSE UR QL: NEGATIVE MG/DL — SIGNIFICANT CHANGE UP
HCT VFR BLD CALC: 40.2 % — SIGNIFICANT CHANGE UP (ref 34.5–45)
HGB BLD-MCNC: 14 G/DL — SIGNIFICANT CHANGE UP (ref 11.5–15.5)
KETONES UR-MCNC: NEGATIVE — SIGNIFICANT CHANGE UP
LEUKOCYTE ESTERASE UR-ACNC: ABNORMAL
LYMPHOCYTES # BLD AUTO: 1.18 K/UL — SIGNIFICANT CHANGE UP (ref 1–3.3)
LYMPHOCYTES # BLD AUTO: 35.4 % — SIGNIFICANT CHANGE UP (ref 13–44)
MANUAL SMEAR VERIFICATION: SIGNIFICANT CHANGE UP
MCHC RBC-ENTMCNC: 30.4 PG — SIGNIFICANT CHANGE UP (ref 27–34)
MCHC RBC-ENTMCNC: 34.8 GM/DL — SIGNIFICANT CHANGE UP (ref 32–36)
MCV RBC AUTO: 87.4 FL — SIGNIFICANT CHANGE UP (ref 80–100)
METHADONE UR-MCNC: NEGATIVE — SIGNIFICANT CHANGE UP
MONOCYTES # BLD AUTO: 0.29 K/UL — SIGNIFICANT CHANGE UP (ref 0–0.9)
MONOCYTES NFR BLD AUTO: 8.8 % — SIGNIFICANT CHANGE UP (ref 2–14)
NEUTROPHILS # BLD AUTO: 1.65 K/UL — LOW (ref 1.8–7.4)
NEUTROPHILS NFR BLD AUTO: 49.6 % — SIGNIFICANT CHANGE UP (ref 43–77)
NITRITE UR-MCNC: NEGATIVE — SIGNIFICANT CHANGE UP
OPIATES UR-MCNC: NEGATIVE — SIGNIFICANT CHANGE UP
OVALOCYTES BLD QL SMEAR: SLIGHT — SIGNIFICANT CHANGE UP
PCP SPEC-MCNC: SIGNIFICANT CHANGE UP
PCP UR-MCNC: NEGATIVE — SIGNIFICANT CHANGE UP
PH UR: 6.5 — SIGNIFICANT CHANGE UP (ref 5–8)
PLAT MORPH BLD: NORMAL — SIGNIFICANT CHANGE UP
PLATELET # BLD AUTO: 162 K/UL — SIGNIFICANT CHANGE UP (ref 150–400)
POIKILOCYTOSIS BLD QL AUTO: SIGNIFICANT CHANGE UP
POLYCHROMASIA BLD QL SMEAR: SLIGHT — SIGNIFICANT CHANGE UP
POTASSIUM SERPL-MCNC: 5.4 MMOL/L — HIGH (ref 3.5–5.3)
POTASSIUM SERPL-SCNC: 5.4 MMOL/L — HIGH (ref 3.5–5.3)
PROT SERPL-MCNC: 8.8 G/DL — HIGH (ref 6.6–8.7)
PROT UR-MCNC: 15
RBC # BLD: 4.6 M/UL — SIGNIFICANT CHANGE UP (ref 3.8–5.2)
RBC # FLD: 13.9 % — SIGNIFICANT CHANGE UP (ref 10.3–14.5)
RBC BLD AUTO: ABNORMAL
RBC CASTS # UR COMP ASSIST: ABNORMAL /HPF (ref 0–4)
SALICYLATES SERPL-MCNC: <0.6 MG/DL — LOW (ref 10–20)
SARS-COV-2 RNA SPEC QL NAA+PROBE: SIGNIFICANT CHANGE UP
SCHISTOCYTES BLD QL AUTO: SLIGHT — SIGNIFICANT CHANGE UP
SMUDGE CELLS # BLD: PRESENT — SIGNIFICANT CHANGE UP
SODIUM SERPL-SCNC: 141 MMOL/L — SIGNIFICANT CHANGE UP (ref 135–145)
SP GR SPEC: 1.01 — SIGNIFICANT CHANGE UP (ref 1.01–1.02)
TARGETS BLD QL SMEAR: SLIGHT — SIGNIFICANT CHANGE UP
THC UR QL: NEGATIVE — SIGNIFICANT CHANGE UP
UROBILINOGEN FLD QL: NEGATIVE MG/DL — SIGNIFICANT CHANGE UP
VARIANT LYMPHS # BLD: 5.3 % — SIGNIFICANT CHANGE UP (ref 0–6)
WBC # BLD: 3.33 K/UL — LOW (ref 3.8–10.5)
WBC # FLD AUTO: 3.33 K/UL — LOW (ref 3.8–10.5)
WBC UR QL: SIGNIFICANT CHANGE UP /HPF (ref 0–5)

## 2022-10-29 PROCEDURE — 93010 ELECTROCARDIOGRAM REPORT: CPT

## 2022-10-29 PROCEDURE — 99220: CPT

## 2022-10-29 RX ORDER — DIVALPROEX SODIUM 500 MG/1
1000 TABLET, DELAYED RELEASE ORAL AT BEDTIME
Refills: 0 | Status: DISCONTINUED | OUTPATIENT
Start: 2022-10-29 | End: 2022-11-05

## 2022-10-29 RX ORDER — NICOTINE POLACRILEX 2 MG
1 GUM BUCCAL DAILY
Refills: 0 | Status: DISCONTINUED | OUTPATIENT
Start: 2022-10-29 | End: 2022-11-05

## 2022-10-29 RX ORDER — BENZTROPINE MESYLATE 1 MG
1 TABLET ORAL
Refills: 0 | Status: DISCONTINUED | OUTPATIENT
Start: 2022-10-29 | End: 2022-11-05

## 2022-10-29 RX ADMIN — Medication 1 PATCH: at 19:30

## 2022-10-29 RX ADMIN — Medication 1 PATCH: at 18:08

## 2022-10-29 NOTE — ED BEHAVIORAL HEALTH ASSESSMENT NOTE - ABUSE / TRAUMA HISTORY
OK to refill. PDMP reviewed; no aberrant behavior identified, prescription authorized and eprescribed.      
[facility] dispensary calls to state patient called them to let them know that script was sent to them in error for her alprazolam. It should have been sent to [facility]. Dispensary asking for this to be called in over to [facility].   Sending to on call to sign.     Incoming refill request for: Alprazolam  Per PDMP last dispensed on: 1/15/19  Last seen by: Margi Baker M.D. on:4/12/19  Future appointment to see PCP on: 4/20/20  Active medication agreement updated on: 4/12/19  Last Drug Screen Collected on: 4/12/19    Script pended, with a start date of: 12/30/19    PDMP review:    Criteria met. Forwarded to Physician/GISELA for signature.  
Unable to assess
no

## 2022-10-29 NOTE — ED PROVIDER NOTE - CARE PLAN
Assessment and plan of treatment:	Patient is a 62 year old female with a PMH significant for HTN, CKD, bipolar disorder is being evaluated for emergency housing placement.     COVID Test results pending.  will see pt in morning.   1 Principal Discharge DX:	Homelessness  Assessment and plan of treatment:	Patient is a 62 year old female with a PMH significant for HTN, CKD, bipolar disorder is being evaluated for emergency housing placement.     COVID Test results pending.  will see pt in morning.

## 2022-10-29 NOTE — CHART NOTE - NSCHARTNOTEFT_GEN_A_CORE
SW Note: Notified by  provider that the plan is to transfer pt for inpt psychiatric care for delusional thoughts and non compliance with meds. 9.37 legals. No beds currently available for transfer called [facility] ( spoke with RN on call)  [phone], [facility] [phone], [facility] [phone] and [facility] [phone].  team aware
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
## 2022-10-29 NOTE — ED BEHAVIORAL HEALTH ASSESSMENT NOTE - DESCRIPTION
, lives with  and daughter T(C): 36.9 (10-29-22 @ 10:53), Max: 36.9 (10-29-22 @ 10:53)  T(F): 98.5 (10-29-22 @ 10:53), Max: 98.5 (10-29-22 @ 10:53)  HR: 72 (10-29-22 @ 10:53) (68 - 72)  BP: 179/107 (10-29-22 @ 10:53) (179/104 - 192/98)  RR: 18 (10-29-22 @ 10:53) (16 - 18)  SpO2: 100% (10-29-22 @ 10:53) (96% - 100%) Breast CA, CKD, Glaucoma, HTN

## 2022-10-29 NOTE — ED PROVIDER NOTE - CLINICAL SUMMARY MEDICAL DECISION MAKING FREE TEXT BOX
Patient is a 62 year old female with a PMH significant for HTN, CKD, bipolar disorder is being evaluated for emergency housing placement.     COVID Test results pending.  will see pt in morning. Patient is a 62 year old female with a PMH significant for HTN, CKD, bipolar disorder is being evaluated for emergency housing placement.     According pt family pt has had worsening delusions. will obtain medical workup and  eval

## 2022-10-29 NOTE — ED BEHAVIORAL HEALTH ASSESSMENT NOTE - RISK ASSESSMENT
Low Acute Suicide Risk RF medication adjustments, paranoid, poor po intake weight loss  PF no known SIB, no substance use, supportive family
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
## 2022-10-29 NOTE — ED BEHAVIORAL HEALTH ASSESSMENT NOTE - SUMMARY
63 yo AA female, lives with  Ramses George and daughter Shelly, PPH Bipolar disorder in tx with [facility] ACT team,  , prior AOT order which , last psych admission approx 2 yrs at [facility] for 2 weeks following son being murdered by [employer], the year before, no prior suicide attempt, no h/o violence, no drug or alcohol use smokes PPD per records, PMH Breast CA, CKD, Glaucoma, HTN bib police requesting medical clearance in order to go to shelter.   Pt presents paranoid and acute delusions involving her  she believes is not her .  Pt paranoid, has not eaten food form home in 2 weeks and left the home to live in a shelter because she is afraid of him.     Pt will require involuntary psych admission due to decline in functioning, safety and paranoid psychosis.   Spoke with  ACT team member Zayra MCCRAY who reports Pt prescribing NPP is Stephanie Elizalde.  Zayra will return call for current med list.

## 2022-10-29 NOTE — ED PROVIDER NOTE - ATTENDING CONTRIBUTION TO CARE
Patient is a 62 year old female with a PMH significant for HTN, CKD, bipolar disorder is being evaluated for emergency housing placement.     COVID Test results pending.  will see pt in morning.
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
## 2022-10-29 NOTE — ED PROVIDER NOTE - NEUROLOGICAL, MLM
[id number]: || ||00\01||False;[id number]: || ||00\01||False;
Alert and oriented, no focal deficits, no motor or sensory deficits.

## 2022-10-30 LAB — VALPROATE SERPL-MCNC: <3.7 UG/ML — LOW (ref 50–100)

## 2022-10-30 PROCEDURE — 99226: CPT

## 2022-10-30 PROCEDURE — 99282 EMERGENCY DEPT VISIT SF MDM: CPT

## 2022-10-30 RX ORDER — AMLODIPINE BESYLATE 2.5 MG/1
10 TABLET ORAL ONCE
Refills: 0 | Status: COMPLETED | OUTPATIENT
Start: 2022-10-30 | End: 2022-10-30

## 2022-10-30 RX ADMIN — Medication 1 PATCH: at 17:50

## 2022-10-30 RX ADMIN — DIVALPROEX SODIUM 1000 MILLIGRAM(S): 500 TABLET, DELAYED RELEASE ORAL at 22:47

## 2022-10-30 RX ADMIN — Medication 1 PATCH: at 22:47

## 2022-10-30 RX ADMIN — Medication 1 PATCH: at 17:56

## 2022-10-30 RX ADMIN — Medication 1 PATCH: at 08:51

## 2022-10-30 RX ADMIN — AMLODIPINE BESYLATE 10 MILLIGRAM(S): 2.5 TABLET ORAL at 15:30

## 2022-10-30 RX ADMIN — Medication 1 MILLIGRAM(S): at 06:40

## 2022-10-30 RX ADMIN — Medication 1 MILLIGRAM(S): at 17:56

## 2022-10-30 NOTE — ED ADULT NURSE REASSESSMENT NOTE - NS ED NURSE REASSESS COMMENT FT1
Assumed care of patient.  Pt alert and cooperative.  v/s taken and charted MD made aware of most recent b/p  Will monitor and chart changes

## 2022-10-30 NOTE — ED ADULT NURSE REASSESSMENT NOTE - NS ED NURSE REASSESS COMMENT FT1
Pt received from night shift RN - and assumed care @ 0730 hrs.   Patient found in a eating breakfast comfortable in no apparent distress Patient is A&Ox4 denies any pain or discomfort even unlabored resperations no SOB.  Bilateral  Lung sounds clear.  Pt was sent to the ED for medical eval and has been placed in observation pending SW intervention for housing placement.  Pt is in a yellow gown on a 1:1 for safety.  Pt denies SI/Hi  Plan of care explained and reviewed with patient, call bell within reach.

## 2022-10-30 NOTE — CHART NOTE - NSCHARTNOTEFT_GEN_A_CORE
SHAZIA Note: SHAZIA following for inpt psych trx. SHAZIA placed call to [facility] ([facility]), [facility] ([facility]) [facility] ([facility]), [facility] ([facility]), [facility] (Juliet), [facility], no beds, placed call to [facility], no call back at this time. [facility] had 1 bed, SW made referral for a pt w/ longer LOS pt w/ longer LOS. SHAZIA placed call to [facility], spoke to Estefany, a bed is available. SHAZIA faxed chart of pt w/ longer LOS, SW following pending bed availability

## 2022-10-30 NOTE — ED ADULT NURSE REASSESSMENT NOTE - NS ED NURSE REASSESS COMMENT FT1
pt awake, alert and oriented. vitals obtained and elevated b/p 193/101, pt denies any dizziness, sob, cp or difficulty breathing. pt medicated with norvasc 10mg for b/p which pt needed to be educated to accept administration. pt accepts half the dose of 5mg at this time and will re-assess in an hour for effectiveness and side affects before taking other half of pill.
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
## 2022-10-30 NOTE — ED ADULT NURSE REASSESSMENT NOTE - NS ED NURSE REASSESS COMMENT FT1
pt resting / sleeping in NAD, repeat b/p 170/74 with no complaints. pt was compliant with medication administration. no attempts to harm herself or others. pt safety being maintained.
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
## 2022-10-31 ENCOUNTER — INPATIENT (INPATIENT)
Facility: HOSPITAL | Age: 62
LOS: 7 days | Discharge: HOME | DRG: 885 | End: 2022-11-08
Attending: PSYCHIATRY & NEUROLOGY | Admitting: PSYCHIATRY & NEUROLOGY
Payer: MEDICARE

## 2022-10-31 VITALS
HEART RATE: 68 BPM | OXYGEN SATURATION: 100 % | DIASTOLIC BLOOD PRESSURE: 74 MMHG | SYSTOLIC BLOOD PRESSURE: 148 MMHG | RESPIRATION RATE: 19 BRPM | TEMPERATURE: 98 F

## 2022-10-31 VITALS — HEIGHT: 65 IN | WEIGHT: 138.01 LBS

## 2022-10-31 DIAGNOSIS — F31.9 BIPOLAR DISORDER, UNSPECIFIED: ICD-10-CM

## 2022-10-31 DIAGNOSIS — H26.9 UNSPECIFIED CATARACT: Chronic | ICD-10-CM

## 2022-10-31 PROCEDURE — 93005 ELECTROCARDIOGRAM TRACING: CPT

## 2022-10-31 PROCEDURE — 80053 COMPREHEN METABOLIC PANEL: CPT

## 2022-10-31 PROCEDURE — U0003: CPT

## 2022-10-31 PROCEDURE — 80307 DRUG TEST PRSMV CHEM ANLYZR: CPT

## 2022-10-31 PROCEDURE — 80061 LIPID PANEL: CPT

## 2022-10-31 PROCEDURE — U0005: CPT

## 2022-10-31 PROCEDURE — 83036 HEMOGLOBIN GLYCOSYLATED A1C: CPT

## 2022-10-31 PROCEDURE — 80048 BASIC METABOLIC PNL TOTAL CA: CPT

## 2022-10-31 PROCEDURE — 99217: CPT

## 2022-10-31 PROCEDURE — 80164 ASSAY DIPROPYLACETIC ACD TOT: CPT

## 2022-10-31 PROCEDURE — 36415 COLL VENOUS BLD VENIPUNCTURE: CPT

## 2022-10-31 PROCEDURE — 85025 COMPLETE CBC W/AUTO DIFF WBC: CPT

## 2022-10-31 PROCEDURE — 93010 ELECTROCARDIOGRAM REPORT: CPT

## 2022-10-31 PROCEDURE — 84443 ASSAY THYROID STIM HORMONE: CPT

## 2022-10-31 PROCEDURE — 99223 1ST HOSP IP/OBS HIGH 75: CPT

## 2022-10-31 PROCEDURE — 86769 SARS-COV-2 COVID-19 ANTIBODY: CPT

## 2022-10-31 PROCEDURE — G0378: CPT

## 2022-10-31 PROCEDURE — 81001 URINALYSIS AUTO W/SCOPE: CPT

## 2022-10-31 PROCEDURE — 99285 EMERGENCY DEPT VISIT HI MDM: CPT

## 2022-10-31 RX ORDER — BENZTROPINE MESYLATE 1 MG
0.5 TABLET ORAL
Refills: 0 | Status: DISCONTINUED | OUTPATIENT
Start: 2022-10-31 | End: 2022-11-08

## 2022-10-31 RX ORDER — LANOLIN ALCOHOL/MO/W.PET/CERES
3 CREAM (GRAM) TOPICAL AT BEDTIME
Refills: 0 | Status: DISCONTINUED | OUTPATIENT
Start: 2022-10-31 | End: 2022-11-08

## 2022-10-31 RX ORDER — HALOPERIDOL DECANOATE 100 MG/ML
1 INJECTION INTRAMUSCULAR
Refills: 0 | Status: DISCONTINUED | OUTPATIENT
Start: 2022-10-31 | End: 2022-11-08

## 2022-10-31 RX ORDER — NICOTINE POLACRILEX 2 MG
1 GUM BUCCAL DAILY
Refills: 0 | Status: DISCONTINUED | OUTPATIENT
Start: 2022-10-31 | End: 2022-11-08

## 2022-10-31 RX ADMIN — Medication 1 MILLIGRAM(S): at 05:43

## 2022-10-31 RX ADMIN — Medication 0.5 MILLIGRAM(S): at 23:11

## 2022-10-31 RX ADMIN — Medication 3 MILLIGRAM(S): at 20:43

## 2022-10-31 RX ADMIN — HALOPERIDOL DECANOATE 1 MILLIGRAM(S): 100 INJECTION INTRAMUSCULAR at 23:11

## 2022-10-31 RX ADMIN — Medication 1 PATCH: at 16:01

## 2022-10-31 RX ADMIN — Medication 1 PATCH: at 18:13

## 2022-10-31 NOTE — BH INPATIENT PSYCHIATRY ASSESSMENT NOTE - RISK ASSESSMENT
RF medication adjustments, paranoid, poor po intake weight loss  PF no known SIB, no substance use, supportive family

## 2022-10-31 NOTE — ED CDU PROVIDER SUBSEQUENT DAY NOTE - NSICDXPASTMEDICALHX_GEN_ALL_CORE_FT
PAST MEDICAL HISTORY:  Bipolar disorder     CKD (chronic kidney disease)     Glaucoma     HTN (hypertension)     
PAST MEDICAL HISTORY:  Bipolar disorder     CKD (chronic kidney disease)     Glaucoma     HTN (hypertension)

## 2022-10-31 NOTE — ED CDU PROVIDER SUBSEQUENT DAY NOTE - HEME/LYMPH NEGATIVE STATEMENT, MLM
no anemia, no easy bruising, no jaundice, no swollen lymph nodes.
no anemia, no easy bruising, no jaundice, no swollen lymph nodes.

## 2022-10-31 NOTE — BH PATIENT PROFILE - FALL HARM RISK - UNIVERSAL INTERVENTIONS
Bed in lowest position, wheels locked, appropriate side rails in place/Call bell, personal items and telephone in reach/Instruct patient to call for assistance before getting out of bed or chair/Non-slip footwear when patient is out of bed/[place] to call system/Physically safe environment - no spills, clutter or unnecessary equipment/Purposeful Proactive Rounding/Room/bathroom lighting operational, light cord in reach
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
## 2022-10-31 NOTE — BH INPATIENT PSYCHIATRY ASSESSMENT NOTE - NSBHCHARTREVIEWVS_PSY_A_CORE FT
Vital Signs Last 24 Hrs  T(C): 36.8 (10-31-22 @ 07:21), Max: 36.8 (10-30-22 @ 13:21)  T(F): 98.2 (10-31-22 @ 07:21), Max: 98.2 (10-30-22 @ 13:21)  HR: 68 (10-31-22 @ 07:21) (66 - 78)  BP: 148/74 (10-31-22 @ 07:21) (148/74 - 193/101)  BP(mean): --  RR: 19 (10-31-22 @ 07:21) (15 - 19)  SpO2: 100% (10-31-22 @ 07:21) (97% - 100%)

## 2022-10-31 NOTE — ED CDU PROVIDER SUBSEQUENT DAY NOTE - ALLERGIC/IMMUNOLOGIC NEGATIVE STATEMENT, MLM
Reason for Call:  Other needs a note/letter faxed to her work [employer] fax # [phone] attention to Perri    Detailed comments: patient needing this for her Asthma Exacerbation that is now clear for her to go back to work on Monday 9-24-18    Phone Number Patient can be reached at: Cell number on file:    Telephone Information:   Mobile [phone]       Best Time: ASAP    Can we leave a detailed message on this number? YES    Call taken on 9/21/2018 at 12:10 PM by Sherly Zuluaga      
no dermatitis, no environmental allergies, no food allergies, no immunosuppressive disorder, and no pruritus.
no dermatitis, no environmental allergies, no food allergies, no immunosuppressive disorder, and no pruritus.

## 2022-10-31 NOTE — ED CDU PROVIDER SUBSEQUENT DAY NOTE - MEDICAL DECISION MAKING DETAILS
Pt remains on observation awaiting inpatient psych bed availability for transfer
Pt remains on observation awaiting inpatient psych bed availability for transfer

## 2022-10-31 NOTE — BH INPATIENT PSYCHIATRY ASSESSMENT NOTE - NSBHCHARTREVIEWLAB_PSY_A_CORE FT
Labs:                       14.0   3.33  )-----------( 162      ( 29 Oct 2022 03:50 )             40.2   10-29    141  |  106  |  11.9  ----------------------------<  72  5.4<H>   |  25.0  |  1.41<H>    Ca    8.9      29 Oct 2022 03:50    TPro  8.8<H>  /  Alb  3.7  /  TBili  0.5  /  DBili  x   /  AST  27  /  ALT  8   /  AlkPhos  37<L>  10-29

## 2022-10-31 NOTE — BH PATIENT PROFILE - STATED REASON FOR ADMISSION
Patient became psychotic and delusional after her AOT endorsed Haldol Decanoate 100mg IM Q4 weeks was decreased to 75mg IM Q4 weeks.

## 2022-10-31 NOTE — ED CDU PROVIDER SUBSEQUENT DAY NOTE - NSICDXFAMILYHX_GEN_ALL_CORE_FT
FAMILY HISTORY:  Sibling  Still living? Yes, Estimated age: 61-70  FHx: breast cancer, Age at diagnosis: Age Unknown    
FAMILY HISTORY:  Sibling  Still living? Yes, Estimated age: 61-70  FHx: breast cancer, Age at diagnosis: Age Unknown

## 2022-10-31 NOTE — BH INPATIENT PSYCHIATRY ASSESSMENT NOTE - NSBHCHARTREVIEWINVESTIGATE_PSY_A_CORE FT
< from: 12 Lead ECG (10.29.22 @ 04:34) >    Ventricular Rate 59 BPM    Atrial Rate 59 BPM    P-R Interval 150 ms    QRS Duration 82 ms    Q-T Interval 446 ms    QTC Calculation(Bazett) 441 ms    P Axis 49 degrees    R Axis 12 degrees    T Axis 262 degrees    Diagnosis Line Sinus bradycardia with Premature atrial complexes in a pattern of bigeminy  Voltage criteria for left ventricular hypertrophy  ST & T wave abnormality, consider inferolateral ischemia  Abnormal ECG    Confirmed by CLAUDIA DOMINGUEZ ([id number]) on 10/29/2022 1:35:46 PM

## 2022-11-01 LAB
A1C WITH ESTIMATED AVERAGE GLUCOSE RESULT: 5.3 % — SIGNIFICANT CHANGE UP (ref 4–5.6)
CHOLEST SERPL-MCNC: 184 MG/DL — SIGNIFICANT CHANGE UP
ESTIMATED AVERAGE GLUCOSE: 105 MG/DL — SIGNIFICANT CHANGE UP (ref 68–114)
HDLC SERPL-MCNC: 68 MG/DL — SIGNIFICANT CHANGE UP
LIPID PNL WITH DIRECT LDL SERPL: 92 MG/DL — SIGNIFICANT CHANGE UP
NON HDL CHOLESTEROL: 116 MG/DL — SIGNIFICANT CHANGE UP
TRIGL SERPL-MCNC: 117 MG/DL — SIGNIFICANT CHANGE UP
TSH SERPL-MCNC: 2.12 UU/ML — SIGNIFICANT CHANGE UP (ref 0.34–4.82)

## 2022-11-01 PROCEDURE — 99223 1ST HOSP IP/OBS HIGH 75: CPT

## 2022-11-01 PROCEDURE — 99232 SBSQ HOSP IP/OBS MODERATE 35: CPT

## 2022-11-01 RX ORDER — AMLODIPINE BESYLATE 2.5 MG/1
10 TABLET ORAL DAILY
Refills: 0 | Status: DISCONTINUED | OUTPATIENT
Start: 2022-11-01 | End: 2022-11-08

## 2022-11-01 RX ADMIN — HALOPERIDOL DECANOATE 1 MILLIGRAM(S): 100 INJECTION INTRAMUSCULAR at 21:17

## 2022-11-01 RX ADMIN — Medication 0.5 MILLIGRAM(S): at 09:10

## 2022-11-01 RX ADMIN — AMLODIPINE BESYLATE 10 MILLIGRAM(S): 2.5 TABLET ORAL at 12:58

## 2022-11-01 RX ADMIN — HALOPERIDOL DECANOATE 1 MILLIGRAM(S): 100 INJECTION INTRAMUSCULAR at 09:09

## 2022-11-01 RX ADMIN — Medication 1 PATCH: at 07:29

## 2022-11-01 RX ADMIN — Medication 1 PATCH: at 22:00

## 2022-11-01 RX ADMIN — Medication 0.5 MILLIGRAM(S): at 21:17

## 2022-11-01 RX ADMIN — Medication 1 PATCH: at 09:10

## 2022-11-01 RX ADMIN — Medication 3 MILLIGRAM(S): at 21:17

## 2022-11-01 RX ADMIN — Medication 1 PATCH: at 07:25

## 2022-11-01 NOTE — CONSULT NOTE ADULT - ASSESSMENT
Patient would like her results to her recent carotid.     Please advise       #Hyperkalemia  - recheck K     #CKD3- avoid nephrotoxins  - check bmp    #HTN  - resume home meds...    #Left ventricular hypertrophy- likely d/t HTN  - no cp, no sob  - needs op f/u w/ cardio- unless develops acute symptoms here.  62F w/PMH HTN, CKD3, glaucoma, bipolar d/o, [facility] for [facility] d/o w/psychosis.  We are consulted for physical exam and manage and tx acute and chronic medical conditions.  Currently, pt is  doing well and has no c/o.  She denies cp, sob, n/v/d, abd pain, dysuria.     #Hyperkalemia  - recheck K now and treat if indicated  - will c/t follow pt     #CKD3- seems to be at baseline  - check bmp  - avoid nephrotoxins,   - encourage hydration    #HTN  - resume home meds- norvasc 10mg   - monitor bp    #Left ventricular hypertrophy- likely d/t HTN, chronic  - no cp, no sob  - needs op f/u w/ cardio- unless develops acute symptoms here.     #bipolar d/o- psych tx

## 2022-11-01 NOTE — BH INPATIENT PSYCHIATRY PROGRESS NOTE - NSBHFUPINTERVALHXFT_PSY_A_CORE
2022: Patient was seen today AM, chart reviewed and discussed in team. Mood in better control, somewhat fixated to go home or when is her probable discharge. She has been meds complaint since admission and was explained the treatment to be given for now and hopefully to discharge next week. She is OK with discharge planning and writer also left message with [facility] to know about her meds given in the past. She was compliant with labs ordered for AM today.      Patient a 61 y/o female, alert, oriented and is meds compliant and would like to know her issue or plan for her, she also gave the phone to the writer to speak with her  of 18 years Mr. Ramses George and is domiciled with him. As per her  she is under care of [facility] ACT team and she was last hospitalized at [facility] in , and since then she is under care of [facility] ACT team who was giving her Haldol Decanoate 100 mg IM/4 weeks and was doing very well with the Haldol Decanoate 100 mg IM 4 weeks. She recently informed the ACT team that she is too drowsy or has too much sleep so the ACT team decided to decrease her Haldol Decanoate to 75 mg IM, instead of Haldol 100 mg IM/4 weeks and she received her last Haldol Decanoate 75 mg IM on 10/15/2022 and since then she is delusional, endorses that her  , and the  and her daughter are trying to poison her and  wants her to get the meds adjusted for stability back to Haldol Decanoate 100 mg IM/4 weeks on which she did very well.    Plan: To start Low dose Haldol 1 mg BID           To start Cogentin 0.5 mg BID  Once stable with no Sandra/Psychosis-to go home to family  F/U with ACT team on discharge

## 2022-11-01 NOTE — CONSULT NOTE ADULT - SUBJECTIVE AND OBJECTIVE BOX
HPI:  62F w/PMH HTN, CKD3, glaucoma, bipolar d/o, admitted to [facility] for [facility] d/o w/psychosis.  We are consulted for physical exam and manage and tx acute and chronic medical conditions.  Currently, pt is      PAST MEDICAL & SURGICAL HISTORY:  HTN (hypertension)  Bipolar disorder  Glaucoma  CKD (chronic kidney disease)  Bilateral cataracts    Review of Systems:   CONSTITUTIONAL: No fever.  EYES: No eye pain or discharge.  ENMT:  No sinus or throat pain  NECK: No pain or stiffness  RESPIRATORY: No cough, wheezing, chills or hemoptysis; No shortness of breath  CARDIOVASCULAR: No chest pain, palpitations, dizziness, or leg swelling  GASTROINTESTINAL: No abdominal or epigastric pain. No nausea, vomiting, or hematemesis; No diarrhea or constipation. No melena or hematochezia.  GENITOURINARY: No dysuria or incontinence  NEUROLOGICAL: No headaches, memory loss, loss of strength, numbness, or tremors  SKIN: No rashes.  MUSCULOSKELETAL: No joint pain or swelling; No muscle, back, or extremity pain  PSYCHIATRIC: ++ depression, ++mood changes    Allergies  No Known Allergies    Social History:     FAMILY HISTORY:  FHx: breast cancer (Sibling)    Home Medications:  aspirin 81 mg oral tablet: 81 milligram(s) orally once a day (14 Jun 2021 17:46)  benztropine 1 mg oral tablet: 1 tab(s) orally once a day (at bedtime) (14 Jun 2021 17:46)  Combigan 0.2%-0.5% ophthalmic solution: 1 drop(s) to each affected eye every 12 hours (13 Jun 2021 21:01)  divalproex sodium 500 mg oral tablet, extended release: 2 tab(s) orally once a day (at bedtime) (14 Jun 2021 17:46)  Haldol Decanoate 50 mg/mL intramuscular solution: intramuscular every 4 weeks (13 Jun 2021 21:02)  latanoprost 0.005% ophthalmic solution: 1 drop(s) to each affected eye once a day (at bedtime) (14 Jun 2021 17:46)  Rhopressa 0.02% ophthalmic solution: 1 drop(s) to each affected eye once a day (in the evening) (13 Jun 2021 21:01)      MEDICATIONS  (STANDING):  benztropine 0.5 milliGRAM(s) Oral two times a day  haloperidol     Tablet 1 milliGRAM(s) Oral two times a day  melatonin 3 milliGRAM(s) Oral at bedtime  nicotine - 21 mG/24Hr(s) Patch 1 Patch Transdermal daily    MEDICATIONS  (PRN):  LORazepam     Tablet 1 milliGRAM(s) Oral every 6 hours PRN Mod Agitation      PHYSICAL EXAM:  Vital Signs Last 24 Hrs  T(C): 36.4 (01 Nov 2022 06:54), Max: 36.4 (01 Nov 2022 06:54)  T(F): 97.5 (01 Nov 2022 06:54), Max: 97.5 (01 Nov 2022 06:54)  HR: --  BP: --  BP(mean): --  RR: 16 (01 Nov 2022 06:54) (16 - 16)  SpO2: 100% (01 Nov 2022 06:54) (100% - 100%)  GENERAL: NAD,   HEAD:  Atraumatic, Normocephalic  EYES: EOMI, PERRLA, conjunctiva and sclera clear  ENT: normal hearing, no nasal discharge, throat clear, dentition normal  NECK: Supple, No JVD, no LAD, no thyromegaly   CHEST/LUNG: Clear to auscultation bilaterally; No wheeze, respirations unlabored  HEART: Regular rate and rhythm; No murmurs, rubs, or gallops  ABDOMEN: Soft, Nontender, Nondistended; Bowel sounds present, no HSM  EXTREMITIES:  2+ Peripheral Pulses, No clubbing, cyanosis, or edema  PSYCH: AAOx3, normal behavior  NEUROLOGY: non-focal, sensory and cn 2-12 intact, speech/language intact  SKIN: No visible rashes or lesions    LABS:  reviewed      RADIOLOGY & ADDITIONAL TESTS:    Imaging Personally Reviewed:    EKG Personally Reviewed:  NSR w/ LVH    Consultant(s) Notes Reviewed:    reviewed [facility] notes and h/p     Care Discussed with Consultants/Other Providers:   HPI:  62F w/PMH HTN, CKD3, glaucoma, bipolar d/o, admitted to [facility] for [facility] d/o w/psychosis.  We are consulted for physical exam and manage and tx acute and chronic medical conditions.  Currently, pt is  doing well and has no c/o.  She denies cp, sob, n/v/d, abd pain, dysuria.     PAST MEDICAL & SURGICAL HISTORY:  HTN (hypertension)  Bipolar disorder  Glaucoma  CKD (chronic kidney disease)  Bilateral cataracts    Review of Systems:   CONSTITUTIONAL: No fever.  EYES: No eye pain or discharge.  ENMT:  No sinus or throat pain  NECK: No pain or stiffness  RESPIRATORY: No cough, wheezing, chills or hemoptysis; No shortness of breath  CARDIOVASCULAR: No chest pain, palpitations, dizziness, or leg swelling  GASTROINTESTINAL: No abdominal or epigastric pain. No nausea, vomiting, or hematemesis; No diarrhea or constipation. No melena or hematochezia.  GENITOURINARY: No dysuria or incontinence  NEUROLOGICAL: No headaches, memory loss, loss of strength, numbness, or tremors  SKIN: No rashes.  MUSCULOSKELETAL: No joint pain or swelling; No muscle, back, or extremity pain  PSYCHIATRIC: ++ depression, ++mood changes    Allergies  No Known Allergies    Social History:     FAMILY HISTORY:  FHx: breast cancer (Sibling)    Home Medications:  aspirin 81 mg oral tablet: 81 milligram(s) orally once a day (14 Jun 2021 17:46)  benztropine 1 mg oral tablet: 1 tab(s) orally once a day (at bedtime) (14 Jun 2021 17:46)  Combigan 0.2%-0.5% ophthalmic solution: 1 drop(s) to each affected eye every 12 hours (13 Jun 2021 21:01)  divalproex sodium 500 mg oral tablet, extended release: 2 tab(s) orally once a day (at bedtime) (14 Jun 2021 17:46)  Haldol Decanoate 50 mg/mL intramuscular solution: intramuscular every 4 weeks (13 Jun 2021 21:02)  latanoprost 0.005% ophthalmic solution: 1 drop(s) to each affected eye once a day (at bedtime) (14 Jun 2021 17:46)  Rhopressa 0.02% ophthalmic solution: 1 drop(s) to each affected eye once a day (in the evening) (13 Jun 2021 21:01)      MEDICATIONS  (STANDING):  benztropine 0.5 milliGRAM(s) Oral two times a day  haloperidol     Tablet 1 milliGRAM(s) Oral two times a day  melatonin 3 milliGRAM(s) Oral at bedtime  nicotine - 21 mG/24Hr(s) Patch 1 Patch Transdermal daily    MEDICATIONS  (PRN):  LORazepam     Tablet 1 milliGRAM(s) Oral every 6 hours PRN Mod Agitation      PHYSICAL EXAM:  Vital Signs Last 24 Hrs  T(C): 36.4 (01 Nov 2022 06:54), Max: 36.4 (01 Nov 2022 06:54)  T(F): 97.5 (01 Nov 2022 06:54), Max: 97.5 (01 Nov 2022 06:54)  HR: --  BP: --  BP(mean): --  RR: 16 (01 Nov 2022 06:54) (16 - 16)  SpO2: 100% (01 Nov 2022 06:54) (100% - 100%)  GENERAL: NAD,   HEAD:  Atraumatic, Normocephalic  EYES: EOMI, PERRLA, conjunctiva and sclera clear  ENT: normal hearing, no nasal discharge, throat clear, dentition normal  NECK: Supple, No JVD, no LAD, no thyromegaly   CHEST/LUNG: Clear to auscultation bilaterally; No wheeze, respirations unlabored  HEART: Regular rate and rhythm; No murmurs, rubs, or gallops  ABDOMEN: Soft, Nontender, Nondistended; Bowel sounds present, no HSM  EXTREMITIES:  2+ Peripheral Pulses, No clubbing, cyanosis, or edema  PSYCH: AAOx3, normal behavior  NEUROLOGY: non-focal, sensory and cn 2-12 intact, speech/language intact  SKIN: No visible rashes or lesions    LABS:  reviewed      RADIOLOGY & ADDITIONAL TESTS:    Imaging Personally Reviewed:    EKG Personally Reviewed:  NSR w/ LVH    Consultant(s) Notes Reviewed:    reviewed [facility] notes and h/p     Care Discussed with Consultants/Other Providers:

## 2022-11-01 NOTE — PSYCHIATRIC REHAB INITIAL EVALUATION - NSBHPRRECOMMEND_PSY_ALL_CORE
Encourage patient to actively participate in 1-2 psych rehab groups daily to improve coping skills.

## 2022-11-01 NOTE — BH INPATIENT PSYCHIATRY PROGRESS NOTE - CURRENT MEDICATION
MEDICATIONS  (STANDING):  amLODIPine   Tablet 10 milliGRAM(s) Oral daily  benztropine 0.5 milliGRAM(s) Oral two times a day  haloperidol     Tablet 1 milliGRAM(s) Oral two times a day  melatonin 3 milliGRAM(s) Oral at bedtime  nicotine - 21 mG/24Hr(s) Patch 1 Patch Transdermal daily    MEDICATIONS  (PRN):  LORazepam     Tablet 1 milliGRAM(s) Oral every 6 hours PRN Mod Agitation

## 2022-11-01 NOTE — BH SAFETY PLAN - INTERNAL COPING STRATEGY 2
Called patient as requested by Cezar  to confirm what is going on with the patient and what she is needing at this time. No answer and LVM   Reading.

## 2022-11-02 PROCEDURE — 99232 SBSQ HOSP IP/OBS MODERATE 35: CPT

## 2022-11-02 RX ADMIN — HALOPERIDOL DECANOATE 1 MILLIGRAM(S): 100 INJECTION INTRAMUSCULAR at 09:09

## 2022-11-02 RX ADMIN — Medication 1 PATCH: at 09:09

## 2022-11-02 RX ADMIN — Medication 3 MILLIGRAM(S): at 20:55

## 2022-11-02 RX ADMIN — Medication 0.5 MILLIGRAM(S): at 09:09

## 2022-11-02 RX ADMIN — Medication 0.5 MILLIGRAM(S): at 20:55

## 2022-11-02 RX ADMIN — Medication 1 PATCH: at 09:28

## 2022-11-02 RX ADMIN — AMLODIPINE BESYLATE 10 MILLIGRAM(S): 2.5 TABLET ORAL at 09:09

## 2022-11-02 RX ADMIN — HALOPERIDOL DECANOATE 1 MILLIGRAM(S): 100 INJECTION INTRAMUSCULAR at 20:55

## 2022-11-02 RX ADMIN — Medication 1 PATCH: at 07:29
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
## 2022-11-02 NOTE — PROGRESS NOTE ADULT - ASSESSMENT
62F w/PMH HTN, CKD3, glaucoma, bipolar d/o, admitted to [facility] for bipolar d/o w/psychosis.  We are consulted for physical exam and manage and tx acute and chronic medical conditions.  Currently, pt is  doing well and has no c/o.  She denies cp, sob, n/v/d, abd pain, dysuria.     #Hyperkalemia  - recheck K now and treat if indicated  - will c/t follow pt     #CKD3- seems to be at baseline  - check bmp  - avoid nephrotoxins,   - encourage hydration    #HTN  - resume home meds- norvasc 10mg   - monitor bp    #Left ventricular hypertrophy- likely d/t HTN, chronic  - no cp, no sob  - needs op f/u w/ cardio- unless develops acute symptoms here.     #bipolar d/o- psych tx     62F w/PMH HTN, CKD3, glaucoma, bipolar d/o, admitted to [facility] for [facility] d/o w/psychosis.  We are consulted for physical exam and manage and tx acute and chronic medical conditions.  Currently, pt is  doing well and has no c/o.  She denies cp, sob, n/v/d, abd pain, dysuria.     #Hyperkalemia. Resolved    #CKD3- seems to be at baseline  - check bmp  - avoid nephrotoxins,   - encourage adequate oral hydration    #HTN  - resume home meds- norvasc 10mg   - monitor bp    #Left ventricular hypertrophy- likely d/t HTN, chronic  - no cp, no sob  - needs op f/u w/ cardio- unless develops acute symptoms here.     #bipolar d/o- psych tx    Recheck BMP in am

## 2022-11-02 NOTE — BH TREATMENT PLAN - NSTXCOPEINTERMD_PSY_ALL_CORE
She received Haldol Decanoate 75 mg IM on 10/15/2022, since then she was more delusional and now to add Haldol 1 mg BID with Cogentin 0.5 mg BID.  Depakote 500 mg HS depending upon blood work, as her cbc-wbc is less in count.

## 2022-11-02 NOTE — BH TREATMENT PLAN - NSTXDISORGINTERRN_PSY_ALL_CORE
Establish trust/therapeutic rapport to encourage ventilation of feelings/provide emotional support.  Maintain safe environment.  Assess mood/suicidality Q shift, document and report changes.  Encourage participation in unit activities with emphasis on coping/stress management.  Encourage medication compliance and provide education  Reality orient as needed.

## 2022-11-02 NOTE — BH TREATMENT PLAN - NSTXPSYCHOINTERMD_PSY_ALL_CORE
Haldol Decanoate 75 mg/4 weeks , last received on 10/15/2022  Oral Haldol 1 mg BID; Cogentin 0.5 mg BID  To start Depakote 500 mg HS, depending upon blood work.

## 2022-11-02 NOTE — PROGRESS NOTE ADULT - SUBJECTIVE AND OBJECTIVE BOX
CC: depression/psychosis    Subjective: no new complaints. feeling better. No pain, SOB, nauea, voiitin    HPI:  62F w/PMH HTN, CKD3, glaucoma, bipolar d/o, admitted to [facility] for bipolar d/o w/psychosis.  We are consulted for physical exam and manage and tx acute and chronic medical conditions.  Currently, pt is  doing well and has no c/o.  She denies cp, sob, n/v/d, abd pain, dysuria.     PAST MEDICAL & SURGICAL HISTORY:  HTN (hypertension)  Bipolar disorder  Glaucoma  CKD (chronic kidney disease)  Bilateral cataracts    Review of Systems:   CONSTITUTIONAL: No fever.  EYES: No eye pain or discharge.  ENMT:  No sinus or throat pain  NECK: No pain or stiffness  RESPIRATORY: No cough, wheezing, chills or hemoptysis; No shortness of breath  CARDIOVASCULAR: No chest pain, palpitations, dizziness, or leg swelling  GASTROINTESTINAL: No abdominal or epigastric pain. No nausea, vomiting, or hematemesis; No diarrhea or constipation. No melena or hematochezia.  GENITOURINARY: No dysuria or incontinence  NEUROLOGICAL: No headaches, memory loss, loss of strength, numbness, or tremors  SKIN: No rashes.  MUSCULOSKELETAL: No joint pain or swelling; No muscle, back, or extremity pain  PSYCHIATRIC: ++ depression, ++mood changes    Allergies  No Known Allergies    Social History:  [employer] use +. [facility],     FAMILY HISTORY:  FHx: breast cancer (Sibling)    Home Medications:  aspirin 81 mg oral tablet: 81 milligram(s) orally once a day (14 Jun 2021 17:46)  benztropine 1 mg oral tablet: 1 tab(s) orally once a day (at bedtime) (14 Jun 2021 17:46)  Combigan 0.2%-0.5% ophthalmic solution: 1 drop(s) to each affected eye every 12 hours (13 Jun 2021 21:01)  divalproex sodium 500 mg oral tablet, extended release: 2 tab(s) orally once a day (at bedtime) (14 Jun 2021 17:46)  Haldol Decanoate 50 mg/mL intramuscular solution: intramuscular every 4 weeks (13 Jun 2021 21:02)  latanoprost 0.005% ophthalmic solution: 1 drop(s) to each affected eye once a day (at bedtime) (14 Jun 2021 17:46)  Rhopressa 0.02% ophthalmic solution: 1 drop(s) to each affected eye once a day (in the evening) (13 Jun 2021 21:01)      MEDICATIONS  (STANDING):  benztropine 0.5 milliGRAM(s) Oral two times a day  haloperidol     Tablet 1 milliGRAM(s) Oral two times a day  melatonin 3 milliGRAM(s) Oral at bedtime  nicotine - 21 mG/24Hr(s) Patch 1 Patch Transdermal daily    MEDICATIONS  (PRN):  LORazepam     Tablet 1 milliGRAM(s) Oral every 6 hours PRN Mod Agitation      PHYSICAL EXAM:    11-01    136  |  105  |  23  ----------------------------<  87  4.1   |  27  |  1.68<H>    Ca    8.7      01 Nov 2022 08:23      COVID-19 PCR: NotDetec (28 Oct 2022 23:05)    CAPILLARY BLOOD GLUCOSE          RR: 16 (01 Nov 2022 06:54) (16 - 16)  SpO2: 100% (01 Nov 2022 06:54) (100% - 100%)  GENERAL: NAD,   HEAD:  Atraumatic, Normocephalic  EYES: EOMI, PERRLA, conjunctiva and sclera clear  ENT: normal hearing, no nasal discharge, throat clear, dentition normal  NECK: Supple, No JVD, no LAD, no thyromegaly   CHEST/LUNG: Clear to auscultation bilaterally; No wheeze, respirations unlabored  HEART: Regular rate and rhythm; No murmurs, rubs, or gallops  ABDOMEN: Soft, Nontender, Nondistended; Bowel sounds present, no HSM  EXTREMITIES:  2+ Peripheral Pulses, No clubbing, cyanosis, or edema  PSYCH: AAOx3, normal behavior  NEUROLOGY: non-focal, sensory and cn 2-12 intact, speech/language intact  SKIN: No visible rashes or lesions    LABS:  reviewed      RADIOLOGY & ADDITIONAL TESTS:    Imaging Personally Reviewed:    EKG Personally Reviewed:  NSR w/ LVH    Consultant(s) Notes Reviewed:    reviewed [facility] notes and h/p     Care Discussed with Consultants/Other Providers:

## 2022-11-02 NOTE — BH TREATMENT PLAN - NSTXDISORGINTERMD_PSY_ALL_CORE
To continue with Haldol 1 mg BID with Cogentin 0.5 mg BID--To improve thought, and deal with other social issues

## 2022-11-02 NOTE — BH TREATMENT PLAN - NSTXPSYCHOINTERPSY_PSY_ALL_CORE
To continue to take meds as suggested from in-patient team and also out-patient ACT Team for stability

## 2022-11-02 NOTE — BH TREATMENT PLAN - NSTXDCOTHRGOAL_PSY_ALL_CORE
Patient will be referred to the appropriate level of outpatient treatment and have appointments within 3-5 days of discharge.  Patient will be discharged to safe housing either back home or DSS emergency housing.   will explore need for duel diagnosis tx with appointments if needed.  Benefits in place

## 2022-11-02 NOTE — BH INPATIENT PSYCHIATRY PROGRESS NOTE - NSBHFUPINTERVALHXFT_PSY_A_CORE
2022: Patient was seen today AM, chart reviewed and discussed in team. She is meds compliant and has been doing well with supplemental oral Haldol 1 mg BID with Cogentin 0.5 mg BID and has been progressing very well. [facility] ACT team was called in today AM @ [phone] and as per ACT team, patient was opting to have decreased dosage of Haldol for months. She was initially on Haldol Decanoate 150 mg/4 weeks , now on Haldol Decanoate 75 mg/4 weeks, last dosage received on 10/15/2022 and as she is on lower dosages of Haldol things stared to change in a different way with new onset delusions, e.g. her  , and was not alert or oriented to time and place. She would be started to have Depakote DR 1000 mg HS as per ACT team, but as her count is low to repeat the CBC/CHEM and then to start Depakote depending upon blood work. No delusions elicited.       2022: Patient was seen today AM, chart reviewed and discussed in team. Mood in better control, somewhat fixated to go home or when is her probable discharge. She has been meds complaint since admission and was explained the treatment to be given for now and hopefully to discharge next week. She is OK with discharge planning and writer also left message with [facility] ACT Team to know about her meds given in the past. She was compliant with labs ordered for AM today.      Patient a 61 y/o female, alert, oriented and is meds compliant and would like to know her issue or plan for her, she also gave the phone to the writer to speak with her  of 18 years Mr. Ramses George and is domiciled with him. As per her  she is under care of [facility] ACT team and she was last hospitalized at [facility] in , and since then she is under care of [facility] ACT team who was giving her Haldol Decanoate 100 mg IM/4 weeks and was doing very well with the Haldol Decanoate 100 mg IM 4 weeks. She recently informed the ACT team that she is too drowsy or has too much sleep so the ACT team decided to decrease her Haldol Decanoate to 75 mg IM, instead of Haldol 100 mg IM/4 weeks and she received her last Haldol Decanoate 75 mg IM on 10/15/2022 and since then she is delusional, endorses that her  , and the  and her daughter are trying to poison her and  wants her to get the meds adjusted for stability back to Haldol Decanoate 100 mg IM/4 weeks on which she did very well.    Plan: To start Low dose Haldol 1 mg BID           To start Cogentin 0.5 mg BID  Once stable with no Sandra/Psychosis-to go home to family  F/U with ACT team on discharge

## 2022-11-03 LAB
ALBUMIN SERPL ELPH-MCNC: 3.1 G/DL — LOW (ref 3.3–5)
ALP SERPL-CCNC: 60 U/L — SIGNIFICANT CHANGE UP (ref 40–120)
ALT FLD-CCNC: 11 U/L — LOW (ref 12–78)
ANION GAP SERPL CALC-SCNC: 3 MMOL/L — LOW (ref 5–17)
AST SERPL-CCNC: 13 U/L — LOW (ref 15–37)
BASOPHILS # BLD AUTO: 0.02 K/UL — SIGNIFICANT CHANGE UP (ref 0–0.2)
BASOPHILS NFR BLD AUTO: 0.5 % — SIGNIFICANT CHANGE UP (ref 0–2)
BILIRUB SERPL-MCNC: 0.4 MG/DL — SIGNIFICANT CHANGE UP (ref 0.2–1.2)
BUN SERPL-MCNC: 25 MG/DL — HIGH (ref 7–23)
CALCIUM SERPL-MCNC: 9.5 MG/DL — SIGNIFICANT CHANGE UP (ref 8.5–10.1)
CHLORIDE SERPL-SCNC: 106 MMOL/L — SIGNIFICANT CHANGE UP (ref 96–108)
CO2 SERPL-SCNC: 27 MMOL/L — SIGNIFICANT CHANGE UP (ref 22–31)
CREAT SERPL-MCNC: 1.43 MG/DL — HIGH (ref 0.5–1.3)
EGFR: 42 ML/MIN/1.73M2 — LOW
EOSINOPHIL # BLD AUTO: 0.04 K/UL — SIGNIFICANT CHANGE UP (ref 0–0.5)
EOSINOPHIL NFR BLD AUTO: 1.1 % — SIGNIFICANT CHANGE UP (ref 0–6)
GLUCOSE SERPL-MCNC: 79 MG/DL — SIGNIFICANT CHANGE UP (ref 70–99)
HCT VFR BLD CALC: 35.2 % — SIGNIFICANT CHANGE UP (ref 34.5–45)
HGB BLD-MCNC: 11.9 G/DL — SIGNIFICANT CHANGE UP (ref 11.5–15.5)
IMM GRANULOCYTES NFR BLD AUTO: 0.3 % — SIGNIFICANT CHANGE UP (ref 0–0.9)
LYMPHOCYTES # BLD AUTO: 1.08 K/UL — SIGNIFICANT CHANGE UP (ref 1–3.3)
LYMPHOCYTES # BLD AUTO: 28.7 % — SIGNIFICANT CHANGE UP (ref 13–44)
MCHC RBC-ENTMCNC: 30.7 PG — SIGNIFICANT CHANGE UP (ref 27–34)
MCHC RBC-ENTMCNC: 33.8 GM/DL — SIGNIFICANT CHANGE UP (ref 32–36)
MCV RBC AUTO: 90.7 FL — SIGNIFICANT CHANGE UP (ref 80–100)
MONOCYTES # BLD AUTO: 0.53 K/UL — SIGNIFICANT CHANGE UP (ref 0–0.9)
MONOCYTES NFR BLD AUTO: 14.1 % — HIGH (ref 2–14)
NEUTROPHILS # BLD AUTO: 2.08 K/UL — SIGNIFICANT CHANGE UP (ref 1.8–7.4)
NEUTROPHILS NFR BLD AUTO: 55.3 % — SIGNIFICANT CHANGE UP (ref 43–77)
PLATELET # BLD AUTO: 166 K/UL — SIGNIFICANT CHANGE UP (ref 150–400)
POTASSIUM SERPL-MCNC: 4.8 MMOL/L — SIGNIFICANT CHANGE UP (ref 3.5–5.3)
POTASSIUM SERPL-SCNC: 4.8 MMOL/L — SIGNIFICANT CHANGE UP (ref 3.5–5.3)
PROT SERPL-MCNC: 8.8 GM/DL — HIGH (ref 6–8.3)
RBC # BLD: 3.88 M/UL — SIGNIFICANT CHANGE UP (ref 3.8–5.2)
RBC # FLD: 13.9 % — SIGNIFICANT CHANGE UP (ref 10.3–14.5)
SODIUM SERPL-SCNC: 136 MMOL/L — SIGNIFICANT CHANGE UP (ref 135–145)
WBC # BLD: 3.76 K/UL — LOW (ref 3.8–10.5)
WBC # FLD AUTO: 3.76 K/UL — LOW (ref 3.8–10.5)

## 2022-11-03 PROCEDURE — 99231 SBSQ HOSP IP/OBS SF/LOW 25: CPT

## 2022-11-03 RX ORDER — DIVALPROEX SODIUM 500 MG/1
250 TABLET, DELAYED RELEASE ORAL
Refills: 0 | Status: ACTIVE | OUTPATIENT
Start: 2022-11-03 | End: 2023-10-02

## 2022-11-03 RX ORDER — DIVALPROEX SODIUM 500 MG/1
250 TABLET, DELAYED RELEASE ORAL ONCE
Refills: 0 | Status: COMPLETED | OUTPATIENT
Start: 2022-11-03 | End: 2022-11-03

## 2022-11-03 RX ADMIN — Medication 0.5 MILLIGRAM(S): at 09:15

## 2022-11-03 RX ADMIN — DIVALPROEX SODIUM 250 MILLIGRAM(S): 500 TABLET, DELAYED RELEASE ORAL at 12:10

## 2022-11-03 RX ADMIN — AMLODIPINE BESYLATE 10 MILLIGRAM(S): 2.5 TABLET ORAL at 09:15

## 2022-11-03 RX ADMIN — DIVALPROEX SODIUM 250 MILLIGRAM(S): 500 TABLET, DELAYED RELEASE ORAL at 20:37

## 2022-11-03 RX ADMIN — Medication 1 PATCH: at 09:14

## 2022-11-03 RX ADMIN — HALOPERIDOL DECANOATE 1 MILLIGRAM(S): 100 INJECTION INTRAMUSCULAR at 09:15

## 2022-11-03 RX ADMIN — HALOPERIDOL DECANOATE 1 MILLIGRAM(S): 100 INJECTION INTRAMUSCULAR at 20:36

## 2022-11-03 RX ADMIN — Medication 0.5 MILLIGRAM(S): at 20:36

## 2022-11-03 NOTE — BH INPATIENT PSYCHIATRY PROGRESS NOTE - NSBHFUPINTERVALHXFT_PSY_A_CORE
2022: Patient was seen today AM, chart reviewed and discussed in team today. Mood in control, no aggression or agitation, pleasant on approach and takes meds as prescribed. Overall seems significantly improved with no delusional beliefs now. [facility] and was also given Depakote Dr 250 mg BID. Her WBC Count was low, so was repeated white count is still low on the upper limit of low normal, Depakote  mg x 1 dose given. To continue meds as ordered.      2022: Patient was seen today AM, chart reviewed and discussed in team. She is meds compliant and has been doing well with supplemental oral Haldol 1 mg BID with Cogentin 0.5 mg BID and has been progressing very well. [facility] AM @ [phone] and as per ACT team, patient was opting to have decreased dosage of Haldol for months. She was initially on Haldol Decanoate 150 mg/4 weeks , now on Haldol Decanoate 75 mg/4 weeks, last dosage received on 10/15/2022 and as she is on lower dosages of Haldol things stared to change in a different way with new onset delusions, e.g. her  , and was not alert or oriented to time and place. She would be started to have Depakote DR 1000 mg HS as per ACT team, but as her count is low to repeat the CBC/CHEM and then to start Depakote depending upon blood work. No delusions elicited.       2022: Patient was seen today AM, chart reviewed and discussed in team. Mood in better control, somewhat fixated to go home or when is her probable discharge. She has been meds complaint since admission and was explained the treatment to be given for now and hopefully to discharge next week. She is OK with discharge planning and [facility] to know about her meds given in the past. She was compliant with labs ordered for AM today.      Patient a 61 y/o female, alert, oriented and is meds compliant and would like to know her issue or plan for her, she also gave the phone to the writer to speak with her  of 18 years Mr. Ramses George and is domiciled with him. As per her  [facility] and she was last hospitalized at [facility] in , and since then [facility] who was giving her Haldol Decanoate 100 mg IM/4 weeks and was doing very well with the Haldol Decanoate 100 mg IM 4 weeks. She recently informed the ACT team that she is too drowsy or has too much sleep so the ACT team decided to decrease her Haldol Decanoate to 75 mg IM, instead of Haldol 100 mg IM/4 weeks and she received her last Haldol Decanoate 75 mg IM on 10/15/2022 and since then she is delusional, endorses that her  , and the  and her daughter are trying to poison her and  wants her to get the meds adjusted for stability back to Haldol Decanoate 100 mg IM/4 weeks on which she did very well.    Plan: To start Low dose Haldol 1 mg BID           To start Cogentin 0.5 mg BID  Once stable with no Sandra/Psychosis-to go home to family  F/U with ACT team on discharge   2022: Patient was seen today AM, chart reviewed and discussed in team today. Mood in control, no aggression or agitation, pleasant on approach and takes meds as prescribed. Overall seems significantly improved with no delusional beliefs now. Writer did speak with NP Stephanie at [facility] and she is also on Depakote 1000 mg HS, she was started Depakote  mg BID. Her WBC Count was low, white count was repeated, repeat count was still low, on the upper limit of low normal, Depakote  mg x 1 dose given. To continue other meds as ordered.      2022: Patient was seen today AM, chart reviewed and discussed in team. She is meds compliant and has been doing well with supplemental oral Haldol 1 mg BID with Cogentin 0.5 mg BID and has been progressing very well. [facility] ACT team was called in today AM @ [phone] and as per ACT team, patient was opting to have decreased dosage of Haldol for months. She was initially on Haldol Decanoate 150 mg/4 weeks , now on Haldol Decanoate 75 mg/4 weeks, last dosage received on 10/15/2022 and as she is on lower dosages of Haldol things stared to change in a different way with new onset delusions, e.g. her  , and was not alert or oriented to time and place. She would be started to have Depakote DR 1000 mg HS as per ACT team, but as her count is low to repeat the CBC/CHEM and then to start Depakote depending upon blood work. No delusions elicited.       2022: Patient was seen today AM, chart reviewed and discussed in team. Mood in better control, somewhat fixated to go home or when is her probable discharge. She has been meds complaint since admission and was explained the treatment to be given for now and hopefully to discharge next week. She is OK with discharge planning and writer also left message with [facility] ACT Team to know about her meds given in the past. She was compliant with labs ordered for AM today.      Patient a 63 y/o female, alert, oriented and is meds compliant and would like to know her issue or plan for her, she also gave the phone to the writer to speak with her  of 18 years Mr. Ramses George and is domiciled with him. As per her  she is under care of [facility] ACT team and she was last hospitalized at [facility] in , and since then she is under care of [facility] ACT team who was giving her Haldol Decanoate 100 mg IM/4 weeks and was doing very well with the Haldol Decanoate 100 mg IM 4 weeks. She recently informed the ACT team that she is too drowsy or has too much sleep so the ACT team decided to decrease her Haldol Decanoate to 75 mg IM, instead of Haldol 100 mg IM/4 weeks and she received her last Haldol Decanoate 75 mg IM on 10/15/2022 and since then she is delusional, endorses that her  , and the  and her daughter are trying to poison her and  wants her to get the meds adjusted for stability back to Haldol Decanoate 100 mg IM/4 weeks on which she did very well.    Plan: To start Low dose Haldol 1 mg BID           To start Cogentin 0.5 mg BID  Once stable with no Sandra/Psychosis-to go home to family  F/U with ACT team on discharge

## 2022-11-03 NOTE — BH INPATIENT PSYCHIATRY PROGRESS NOTE - CURRENT MEDICATION
MEDICATIONS  (STANDING):  amLODIPine   Tablet 10 milliGRAM(s) Oral daily  benztropine 0.5 milliGRAM(s) Oral two times a day  diVALproex  milliGRAM(s) Oral once  diVALproex  milliGRAM(s) Oral two times a day  haloperidol     Tablet 1 milliGRAM(s) Oral two times a day  melatonin 3 milliGRAM(s) Oral at bedtime  nicotine - 21 mG/24Hr(s) Patch 1 Patch Transdermal daily    MEDICATIONS  (PRN):  LORazepam     Tablet 1 milliGRAM(s) Oral every 6 hours PRN Mod Agitation   MEDICATIONS  (STANDING):  amLODIPine   Tablet 10 milliGRAM(s) Oral daily  benztropine 0.5 milliGRAM(s) Oral two times a day  diVALproex  milliGRAM(s) Oral two times a day  haloperidol     Tablet 1 milliGRAM(s) Oral two times a day  melatonin 3 milliGRAM(s) Oral at bedtime  nicotine - 21 mG/24Hr(s) Patch 1 Patch Transdermal daily    MEDICATIONS  (PRN):  LORazepam     Tablet 1 milliGRAM(s) Oral every 6 hours PRN Mod Agitation

## 2022-11-03 NOTE — CHART NOTE - NSCHARTNOTEFT_GEN_A_CORE
Cr and K remain stable.   Patient currently medically stable.  Encourage adequate oral hydration. Repeat labs in 1-2 weeks  Medicine/Hospitalist to sign off. Re-call Medicine/Hospitalist PRN.

## 2022-11-04 PROCEDURE — 99231 SBSQ HOSP IP/OBS SF/LOW 25: CPT

## 2022-11-04 RX ADMIN — Medication 0.5 MILLIGRAM(S): at 09:23

## 2022-11-04 RX ADMIN — AMLODIPINE BESYLATE 10 MILLIGRAM(S): 2.5 TABLET ORAL at 09:23

## 2022-11-04 RX ADMIN — HALOPERIDOL DECANOATE 1 MILLIGRAM(S): 100 INJECTION INTRAMUSCULAR at 09:23

## 2022-11-04 RX ADMIN — Medication 1 PATCH: at 09:23

## 2022-11-04 RX ADMIN — DIVALPROEX SODIUM 250 MILLIGRAM(S): 500 TABLET, DELAYED RELEASE ORAL at 20:40

## 2022-11-04 RX ADMIN — Medication 0.5 MILLIGRAM(S): at 20:40

## 2022-11-04 RX ADMIN — HALOPERIDOL DECANOATE 1 MILLIGRAM(S): 100 INJECTION INTRAMUSCULAR at 20:40

## 2022-11-04 RX ADMIN — Medication 1 PATCH: at 19:03

## 2022-11-04 RX ADMIN — DIVALPROEX SODIUM 250 MILLIGRAM(S): 500 TABLET, DELAYED RELEASE ORAL at 09:24

## 2022-11-04 NOTE — BH INPATIENT PSYCHIATRY PROGRESS NOTE - NSBHFUPINTERVALHXFT_PSY_A_CORE
2022 : Patient was seen today AM, chart reviewed and discussed in team. She is also meds complaint, started her on Depakote  mg BID, was told to repeat Depakote level on Monday with CBC. Mood in better control now, denied delusional beliefs about her . Good sleep/appetite, no aggression/agitation. To continue current meds for stability/safety. [facility] aware about discharge on Wednesday. She received Haldol Decanoate 75 mg IM on 10/15/2022, next dosage is due on 2022. Plan to give her Haldol Decanoate 100 mg IM/4 weeks          2022: Patient was seen today AM, chart reviewed and discussed in team today. Mood in control, no aggression or agitation, pleasant on approach and takes meds as prescribed. Overall seems significantly improved with no delusional beliefs now. Writer did speak with NP Stephanie at [facility] and she is also on Depakote 1000 mg HS, she was started Depakote  mg BID. Her WBC Count was low, white count was repeated, repeat count was still low, on the upper limit of low normal, Depakote  mg x 1 dose given. To continue other meds as ordered.      2022: Patient was seen today AM, chart reviewed and discussed in team. She is meds compliant and has been doing well with supplemental oral Haldol 1 mg BID with Cogentin 0.5 mg BID and has been progressing very well. [facility] ACT team was called in today AM @ [phone] and as per ACT team, patient was opting to have decreased dosage of Haldol for months. She was initially on Haldol Decanoate 150 mg/4 weeks , now on Haldol Decanoate 75 mg/4 weeks, last dosage received on 10/15/2022 and as she is on lower dosages of Haldol things stared to change in a different way with new onset delusions, e.g. her  , and was not alert or oriented to time and place. She would be started to have Depakote DR 1000 mg HS as per ACT team, but as her count is low to repeat the CBC/CHEM and then to start Depakote depending upon blood work. No delusions elicited.       2022: Patient was seen today AM, chart reviewed and discussed in team. Mood in better control, somewhat fixated to go home or when is her probable discharge. She has been meds complaint since admission and was explained the treatment to be given for now and hopefully to discharge next week. She is OK with discharge planning and writer also left message with [facility] ACT Team to know about her meds given in the past. She was compliant with labs ordered for AM today.      Patient a 63 y/o female, alert, oriented and is meds compliant and would like to know her issue or plan for her, she also gave the phone to the writer to speak with her  of 18 years Mr. Ramses George and is domiciled with him. As per her  she is under care of [facility] ACT team and she was last hospitalized at [facility] in , and since then she is under care of [facility] ACT team who was giving her Haldol Decanoate 100 mg IM/4 weeks and was doing very well with the Haldol Decanoate 100 mg IM 4 weeks. She recently informed the ACT team that she is too drowsy or has too much sleep so the ACT team decided to decrease her Haldol Decanoate to 75 mg IM, instead of Haldol 100 mg IM/4 weeks and she received her last Haldol Decanoate 75 mg IM on 10/15/2022 and since then she is delusional, endorses that her  , and the  and her daughter are trying to poison her and  wants her to get the meds adjusted for stability back to Haldol Decanoate 100 mg IM/4 weeks on which she did very well.    Plan: To start Low dose Haldol 1 mg BID           To start Cogentin 0.5 mg BID  Once stable with no Sandra/Psychosis-to go home to family  F/U with ACT team on discharge

## 2022-11-04 NOTE — BH INPATIENT PSYCHIATRY PROGRESS NOTE - CURRENT MEDICATION
MEDICATIONS  (STANDING):  amLODIPine   Tablet 10 milliGRAM(s) Oral daily  benztropine 0.5 milliGRAM(s) Oral two times a day  diVALproex  milliGRAM(s) Oral two times a day  haloperidol     Tablet 1 milliGRAM(s) Oral two times a day  melatonin 3 milliGRAM(s) Oral at bedtime  nicotine - 21 mG/24Hr(s) Patch 1 Patch Transdermal daily    MEDICATIONS  (PRN):  LORazepam     Tablet 1 milliGRAM(s) Oral every 6 hours PRN Mod Agitation

## 2022-11-05 PROCEDURE — 99232 SBSQ HOSP IP/OBS MODERATE 35: CPT

## 2022-11-05 RX ADMIN — Medication 0.5 MILLIGRAM(S): at 09:22

## 2022-11-05 RX ADMIN — HALOPERIDOL DECANOATE 1 MILLIGRAM(S): 100 INJECTION INTRAMUSCULAR at 09:22

## 2022-11-05 RX ADMIN — Medication 1 PATCH: at 19:37

## 2022-11-05 RX ADMIN — Medication 1 PATCH: at 09:21

## 2022-11-05 RX ADMIN — Medication 1 PATCH: at 08:47

## 2022-11-05 RX ADMIN — AMLODIPINE BESYLATE 10 MILLIGRAM(S): 2.5 TABLET ORAL at 09:22

## 2022-11-05 RX ADMIN — Medication 0.5 MILLIGRAM(S): at 21:28

## 2022-11-05 RX ADMIN — Medication 1 PATCH: at 09:30

## 2022-11-05 RX ADMIN — DIVALPROEX SODIUM 250 MILLIGRAM(S): 500 TABLET, DELAYED RELEASE ORAL at 21:28

## 2022-11-05 RX ADMIN — DIVALPROEX SODIUM 250 MILLIGRAM(S): 500 TABLET, DELAYED RELEASE ORAL at 09:22

## 2022-11-05 RX ADMIN — HALOPERIDOL DECANOATE 1 MILLIGRAM(S): 100 INJECTION INTRAMUSCULAR at 21:28

## 2022-11-05 NOTE — BH INPATIENT PSYCHIATRY PROGRESS NOTE - NSBHFUPINTERVALHXFT_PSY_A_CORE
Covering MD Note ( 2022)   Pt seen in her room where she lay in bed, dressed and resting after lunch. Pt remains somewhat hypervigilant and largely socially isolative and withdrawn    though  more visible on the unit as she walks in the hallway by herself. The pt is tolerating her current med regimen of low dose Haldol and Depakote in light of the pt's somewhat low WBC and ANC. The pt was well aware of the plan to recheck her Depakote level and CBC over the next few days and she is looking forward to her DC from hospital later in the upcoming week as discussed with Dr. Hess.    The pt reported generally good sleep and appetite. She denies any current SI /HI  /AH /VH    and  appears pleasant and  cooperative with appropriate smiling  and good eye contact thought at times, the pt continues to appear distracted and ? internally preoccupied.     2022 : Patient was seen today AM, chart reviewed and discussed in team. She is also meds complaint, started her on Depakote  mg BID, was told to repeat Depakote level on Monday with CBC. Mood in better control now, denied delusional beliefs about her . Good sleep/appetite, no aggression/agitation. To continue current meds for stability/safety. [facility] aware about discharge on Wednesday. She received Haldol Decanoate 75 mg IM on 10/15/2022, next dosage is due on 2022. Plan to give her Haldol Decanoate 100 mg IM/4 weeks          2022: Patient was seen today AM, chart reviewed and discussed in team today. Mood in control, no aggression or agitation, pleasant on approach and takes meds as prescribed. Overall seems significantly improved with no delusional beliefs now. Writer did speak with NP Stephanie at [facility] and she is also on Depakote 1000 mg HS, she was started Depakote  mg BID. Her WBC Count was low, white count was repeated, repeat count was still low, on the upper limit of low normal, Depakote  mg x 1 dose given. To continue other meds as ordered.      2022: Patient was seen today AM, chart reviewed and discussed in team. She is meds compliant and has been doing well with supplemental oral Haldol 1 mg BID with Cogentin 0.5 mg BID and has been progressing very well. [facility] ACT team was called in today AM @ [phone] and as per ACT team, patient was opting to have decreased dosage of Haldol for months. She was initially on Haldol Decanoate 150 mg/4 weeks , now on Haldol Decanoate 75 mg/4 weeks, last dosage received on 10/15/2022 and as she is on lower dosages of Haldol things stared to change in a different way with new onset delusions, e.g. her  , and was not alert or oriented to time and place. She would be started to have Depakote DR 1000 mg HS as per ACT team, but as her count is low to repeat the CBC/CHEM and then to start Depakote depending upon blood work. No delusions elicited.       2022: Patient was seen today AM, chart reviewed and discussed in team. Mood in better control, somewhat fixated to go home or when is her probable discharge. She has been meds complaint since admission and was explained the treatment to be given for now and hopefully to discharge next week. She is OK with discharge planning and writer also left message with [facility] ACT Team to know about her meds given in the past. She was compliant with labs ordered for AM today.      Patient a 61 y/o female, alert, oriented and is meds compliant and would like to know her issue or plan for her, she also gave the phone to the writer to speak with her  of 18 years Mr. Ramses George and is domiciled with him. As per her  she is under care of [facility] ACT team and she was last hospitalized at [facility] in , and since then she is under care of [facility] ACT team who was giving her Haldol Decanoate 100 mg IM/4 weeks and was doing very well with the Haldol Decanoate 100 mg IM 4 weeks. She recently informed the ACT team that she is too drowsy or has too much sleep so the ACT team decided to decrease her Haldol Decanoate to 75 mg IM, instead of Haldol 100 mg IM/4 weeks and she received her last Haldol Decanoate 75 mg IM on 10/15/2022 and since then she is delusional, endorses that her  , and the  and her daughter are trying to poison her and  wants her to get the meds adjusted for stability back to Haldol Decanoate 100 mg IM/4 weeks on which she did very well.    Plan: To start Low dose Haldol 1 mg BID           To start Cogentin 0.5 mg BID  Once stable with no Sandra/Psychosis-to go home to family  F/U with ACT team on discharge

## 2022-11-06 RX ADMIN — HALOPERIDOL DECANOATE 1 MILLIGRAM(S): 100 INJECTION INTRAMUSCULAR at 20:45

## 2022-11-06 RX ADMIN — Medication 1 PATCH: at 10:08

## 2022-11-06 RX ADMIN — Medication 3 MILLIGRAM(S): at 20:46

## 2022-11-06 RX ADMIN — DIVALPROEX SODIUM 250 MILLIGRAM(S): 500 TABLET, DELAYED RELEASE ORAL at 10:06

## 2022-11-06 RX ADMIN — AMLODIPINE BESYLATE 10 MILLIGRAM(S): 2.5 TABLET ORAL at 10:06

## 2022-11-06 RX ADMIN — Medication 1 PATCH: at 07:37

## 2022-11-06 RX ADMIN — HALOPERIDOL DECANOATE 1 MILLIGRAM(S): 100 INJECTION INTRAMUSCULAR at 10:06

## 2022-11-06 RX ADMIN — DIVALPROEX SODIUM 250 MILLIGRAM(S): 500 TABLET, DELAYED RELEASE ORAL at 20:45

## 2022-11-06 RX ADMIN — Medication 0.5 MILLIGRAM(S): at 10:05

## 2022-11-06 RX ADMIN — Medication 1 PATCH: at 10:07

## 2022-11-06 RX ADMIN — Medication 1 PATCH: at 17:58

## 2022-11-06 RX ADMIN — Medication 0.5 MILLIGRAM(S): at 20:45

## 2022-11-07 LAB
BASOPHILS # BLD AUTO: 0.01 K/UL — SIGNIFICANT CHANGE UP (ref 0–0.2)
BASOPHILS NFR BLD AUTO: 0.3 % — SIGNIFICANT CHANGE UP (ref 0–2)
EOSINOPHIL # BLD AUTO: 0.04 K/UL — SIGNIFICANT CHANGE UP (ref 0–0.5)
EOSINOPHIL NFR BLD AUTO: 1.2 % — SIGNIFICANT CHANGE UP (ref 0–6)
HCT VFR BLD CALC: 36.7 % — SIGNIFICANT CHANGE UP (ref 34.5–45)
HGB BLD-MCNC: 12.5 G/DL — SIGNIFICANT CHANGE UP (ref 11.5–15.5)
IMM GRANULOCYTES NFR BLD AUTO: 0.3 % — SIGNIFICANT CHANGE UP (ref 0–0.9)
LYMPHOCYTES # BLD AUTO: 1.05 K/UL — SIGNIFICANT CHANGE UP (ref 1–3.3)
LYMPHOCYTES # BLD AUTO: 31.2 % — SIGNIFICANT CHANGE UP (ref 13–44)
MCHC RBC-ENTMCNC: 30.5 PG — SIGNIFICANT CHANGE UP (ref 27–34)
MCHC RBC-ENTMCNC: 34.1 GM/DL — SIGNIFICANT CHANGE UP (ref 32–36)
MCV RBC AUTO: 89.5 FL — SIGNIFICANT CHANGE UP (ref 80–100)
MONOCYTES # BLD AUTO: 0.38 K/UL — SIGNIFICANT CHANGE UP (ref 0–0.9)
MONOCYTES NFR BLD AUTO: 11.3 % — SIGNIFICANT CHANGE UP (ref 2–14)
NEUTROPHILS # BLD AUTO: 1.88 K/UL — SIGNIFICANT CHANGE UP (ref 1.8–7.4)
NEUTROPHILS NFR BLD AUTO: 55.7 % — SIGNIFICANT CHANGE UP (ref 43–77)
PLATELET # BLD AUTO: 198 K/UL — SIGNIFICANT CHANGE UP (ref 150–400)
RBC # BLD: 4.1 M/UL — SIGNIFICANT CHANGE UP (ref 3.8–5.2)
RBC # FLD: 13.6 % — SIGNIFICANT CHANGE UP (ref 10.3–14.5)
VALPROATE SERPL-MCNC: 38 UG/ML — LOW (ref 50–100)
WBC # BLD: 3.37 K/UL — LOW (ref 3.8–10.5)
WBC # FLD AUTO: 3.37 K/UL — LOW (ref 3.8–10.5)

## 2022-11-07 PROCEDURE — 99232 SBSQ HOSP IP/OBS MODERATE 35: CPT

## 2022-11-07 RX ADMIN — DIVALPROEX SODIUM 250 MILLIGRAM(S): 500 TABLET, DELAYED RELEASE ORAL at 09:08

## 2022-11-07 RX ADMIN — HALOPERIDOL DECANOATE 1 MILLIGRAM(S): 100 INJECTION INTRAMUSCULAR at 09:08

## 2022-11-07 RX ADMIN — Medication 0.5 MILLIGRAM(S): at 09:08

## 2022-11-07 RX ADMIN — HALOPERIDOL DECANOATE 1 MILLIGRAM(S): 100 INJECTION INTRAMUSCULAR at 21:44

## 2022-11-07 RX ADMIN — DIVALPROEX SODIUM 250 MILLIGRAM(S): 500 TABLET, DELAYED RELEASE ORAL at 21:43

## 2022-11-07 RX ADMIN — AMLODIPINE BESYLATE 10 MILLIGRAM(S): 2.5 TABLET ORAL at 09:08

## 2022-11-07 RX ADMIN — Medication 0.5 MILLIGRAM(S): at 21:43

## 2022-11-07 RX ADMIN — Medication 1 PATCH: at 09:08

## 2022-11-07 NOTE — BH DISCHARGE NOTE NURSING/SOCIAL WORK/PSYCH REHAB - NSDCPRRECOMMEND_PSY_ALL_CORE
Attend aftercare appointments and follow recommendation of treatment team for continued care in the community.

## 2022-11-07 NOTE — BH INPATIENT PSYCHIATRY PROGRESS NOTE - NSBHFUPINTERVALHXFT_PSY_A_CORE
2022: Patient was seen today AM, chart reviewed and discussed in team. Mood in control, visible in unit. No aggression pr agitation reported. Denies A/V/H or Paranoid beliefs. Overall improved as per the  and he feels OK to take her back tomorrow. She has fair to good sleep/appetite and does not want to take Melatonin. Writer left a message with [facility] ACT team. To continue with Haldol for now and may stop the Depakote due to decreased WBC count. Valproic Acid level is 38.    Covering MD Note ( 2022)   Pt seen in her room where she lay in bed, dressed and resting after lunch. Pt remains somewhat hypervigilant and largely socially isolative and withdrawn    though  more visible on the unit as she walks in the hallway by herself. The pt is tolerating her current med regimen of low dose Haldol and Depakote in light of the pt's somewhat low WBC and ANC. The pt was well aware of the plan to recheck her Depakote level and CBC over the next few days and she is looking forward to her DC from hospital later in the upcoming week as discussed with Dr. Hess.    The pt reported generally good sleep and appetite. She denies any current SI /HI  /AH /VH    and  appears pleasant and  cooperative with appropriate smiling  and good eye contact thought at times, the pt continues to appear distracted and ? internally preoccupied.     2022 : Patient was seen today AM, chart reviewed and discussed in team. She is also meds complaint, started her on Depakote  mg BID, was told to repeat Depakote level on Monday with CBC. Mood in better control now, denied delusional beliefs about her . Good sleep/appetite, no aggression/agitation. To continue current meds for stability/safety. [facility] aware about discharge on Wednesday. She received Haldol Decanoate 75 mg IM on 10/15/2022, next dosage is due on 2022. Plan to give her Haldol Decanoate 100 mg IM/4 weeks    2022: Patient was seen today AM, chart reviewed and discussed in team today. Mood in control, no aggression or agitation, pleasant on approach and takes meds as prescribed. Overall seems significantly improved with no delusional beliefs now. Writer did speak with NP Stephanie at [facility] and she is also on Depakote 1000 mg HS, she was started Depakote  mg BID. Her WBC Count was low, white count was repeated, repeat count was still low, on the upper limit of low normal, Depakote  mg x 1 dose given. To continue other meds as ordered.    Patient a 61 y/o female, alert, oriented and is meds compliant and would like to know her issue or plan for her, she also gave the phone to the writer to speak with her  of 18 years Mr. Ramses George and is domiciled with him. As per her  she is under care of [facility] ACT team and she was last hospitalized at [facility] in , and since then she is under care of [facility] ACT team who was giving her Haldol Decanoate 100 mg IM/4 weeks and was doing very well with the Haldol Decanoate 100 mg IM 4 weeks. She recently informed the ACT team that she is too drowsy or has too much sleep so the ACT team decided to decrease her Haldol Decanoate to 75 mg IM, instead of Haldol 100 mg IM/4 weeks and she received her last Haldol Decanoate 75 mg IM on 10/15/2022 and since then she is delusional, endorses that her  , and the  and her daughter are trying to poison her and  wants her to get the meds adjusted for stability back to Haldol Decanoate 100 mg IM/4 weeks on which she did very well.    Plan: To start Low dose Haldol 1 mg BID           To start Cogentin 0.5 mg BID  Once stable with no Sandra/Psychosis-to go home to family  F/U with ACT team on discharge

## 2022-11-07 NOTE — BH DISCHARGE NOTE NURSING/SOCIAL WORK/PSYCH REHAB - NSDCPEEMAIL_GEN_ALL_CORE
[facility] for Tobacco Control email [email or web address]

## 2022-11-07 NOTE — BH DISCHARGE NOTE NURSING/SOCIAL WORK/PSYCH REHAB - NSDCPEWEB_GEN_ALL_CORE
[facility] for Tobacco Control website --- [email or web address] website --- [email or web address]

## 2022-11-07 NOTE — BH DISCHARGE NOTE NURSING/SOCIAL WORK/PSYCH REHAB - NSDCPLANREVIEWED_PSY_ALL_CORE
The patient has agreed to receive a copy of the discharge note through the patient portal The discharge note was reviewed with the patient and the patient has agreed to receive a copy of the discharge note through the patient portal./Yes

## 2022-11-07 NOTE — BH DISCHARGE NOTE NURSING/SOCIAL WORK/PSYCH REHAB - PATIENT PORTAL LINK FT
You can access the FollowMyHealth Patient Portal offered by [facility] by registering at the following website: [email or web address]. By joining [employer]’s FollowMyHealth portal, you will also be able to view your health information using other applications (apps) compatible with our system.

## 2022-11-07 NOTE — BH DISCHARGE NOTE NURSING/SOCIAL WORK/PSYCH REHAB - NSCDUDCCRISIS_PSY_A_CORE
[facility]  [phone] [facility]-WELL ([phone])  Text "WELL" to 80284  Website: www.[employer]/.[facility] 1 (224) 621-HOPE (4090) Website: www.safehorizon.org/.  [facility] - [facility] – Crisis Care for Children, Adults and Families  [address] [place] [address]  Mobile Crisis Hotline – (991) 279-8674/.National Suicide Prevention Lifeline 0 (983) 236-8630/.  [facility]  1 (315) LIFENET (555-1445)/.  [facility]  (858) 121-5297/.  [facility] Behavioral Health Helpline / [facility] Mobile Crisis  (695) 489-AAYB (8461)/.  [facility] Response Crisis Hotline  (185) 542-9291  24 hour telephone crisis intervention and suicide prevention hotline concerned with all mental health issues/.  [facility]  [address] [place] [address] [phone]   Hours:  Monday through Friday from 9 AM to 3 PM/Other.../988 Suicide and Crisis Lifeline

## 2022-11-07 NOTE — BH DISCHARGE NOTE NURSING/SOCIAL WORK/PSYCH REHAB - NSDCADDINFO1FT_PSY_ALL_CORE
Your SHAZIA Alvarado will meet you at your home on 11/9 @ 9am. If you have any questions regarding this appt, please contact the number above.

## 2022-11-08 VITALS — OXYGEN SATURATION: 98 % | RESPIRATION RATE: 16 BRPM | TEMPERATURE: 98 F

## 2022-11-08 LAB
BASOPHILS # BLD AUTO: 0.01 K/UL — SIGNIFICANT CHANGE UP (ref 0–0.2)
EOSINOPHIL NFR BLD AUTO: 1.2 % — SIGNIFICANT CHANGE UP (ref 0–6)
HCT VFR BLD CALC: 34.6 % — SIGNIFICANT CHANGE UP (ref 34.5–45)
HGB BLD-MCNC: 11.8 G/DL — SIGNIFICANT CHANGE UP (ref 11.5–15.5)
LYMPHOCYTES # BLD AUTO: 21.2 % — SIGNIFICANT CHANGE UP (ref 13–44)
MCHC RBC-ENTMCNC: 30.3 PG — SIGNIFICANT CHANGE UP (ref 27–34)
MONOCYTES # BLD AUTO: 0.39 K/UL — SIGNIFICANT CHANGE UP (ref 0–0.9)
MONOCYTES NFR BLD AUTO: 11.6 % — SIGNIFICANT CHANGE UP (ref 2–14)
NEUTROPHILS # BLD AUTO: 2.19 K/UL — SIGNIFICANT CHANGE UP (ref 1.8–7.4)
NEUTROPHILS NFR BLD AUTO: 65.4 % — SIGNIFICANT CHANGE UP (ref 43–77)
RBC # FLD: 13.8 % — SIGNIFICANT CHANGE UP (ref 10.3–14.5)

## 2022-11-08 PROCEDURE — 99239 HOSP IP/OBS DSCHRG MGMT >30: CPT

## 2022-11-08 RX ORDER — AMLODIPINE BESYLATE 2.5 MG/1
1 TABLET ORAL
Qty: 30 | Refills: 0
Start: 2022-11-08 | End: 2022-12-07

## 2022-11-08 RX ORDER — HALOPERIDOL DECANOATE 100 MG/ML
1 INJECTION INTRAMUSCULAR
Qty: 60 | Refills: 0
Start: 2022-11-08 | End: 2022-12-07

## 2022-11-08 RX ORDER — HALOPERIDOL DECANOATE 100 MG/ML
100 INJECTION INTRAMUSCULAR
Qty: 0 | Refills: 0 | DISCHARGE

## 2022-11-08 RX ORDER — BENZTROPINE MESYLATE 1 MG
1 TABLET ORAL
Qty: 60 | Refills: 0
Start: 2022-11-08 | End: 2022-12-07

## 2022-11-08 RX ADMIN — Medication 1 PATCH: at 07:43

## 2022-11-08 NOTE — BH INPATIENT PSYCHIATRY PROGRESS NOTE - NSCGISEVERILLNESS_PSY_ALL_CORE
5 = Markedly ill - intrusive symptoms that distinctly impair social/occupational function or cause intrusive levels of distress
3 = Mildly ill – clearly established symptoms with minimal, if any, distress or difficulty in social and occupational function
3 = Mildly ill – clearly established symptoms with minimal, if any, distress or difficulty in social and occupational function
5 = Markedly ill - intrusive symptoms that distinctly impair social/occupational function or cause intrusive levels of distress
3 = Mildly ill – clearly established symptoms with minimal, if any, distress or difficulty in social and occupational function
3 = Mildly ill – clearly established symptoms with minimal, if any, distress or difficulty in social and occupational function
5 = Markedly ill - intrusive symptoms that distinctly impair social/occupational function or cause intrusive levels of distress

## 2022-11-08 NOTE — BH INPATIENT PSYCHIATRY PROGRESS NOTE - NSICDXBHPRIMARYDX_PSY_ALL_CORE
Severe bipolar affective disorder with psychosis   F31.89  

## 2022-11-08 NOTE — BH INPATIENT PSYCHIATRY PROGRESS NOTE - NSTXDISORGGOAL_PSY_ALL_CORE
Will demonstrate purposeful and predictable thoughts/behaviors by making a request

## 2022-11-08 NOTE — BH INPATIENT PSYCHIATRY PROGRESS NOTE - NSCGIIMPROVESX_PSY_ALL_CORE
2 = Much improved - notably better with signficant reduction of symptoms; increase in the level of functioning but some symptoms remain
4 = No change - symptoms remain essentially unchanged
4 = No change - symptoms remain essentially unchanged
2 = Much improved - notably better with signficant reduction of symptoms; increase in the level of functioning but some symptoms remain
4 = No change - symptoms remain essentially unchanged
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
## 2022-11-08 NOTE — BH INPATIENT PSYCHIATRY PROGRESS NOTE - NSBHMETABOLIC_PSY_ALL_CORE_FT
BMI: BMI (kg/m2): 23 (10-31-22 @ 13:49)  HbA1c: A1C with Estimated Average Glucose Result: 5.3 % (11-01-22 @ 08:23)    Glucose:   BP: --  Lipid Panel: Date/Time: 11-01-22 @ 08:23  Cholesterol, Serum: 184  Direct LDL: --  HDL Cholesterol, Serum: 68  Total Cholesterol/HDL Ration Measurement: --  Triglycerides, Serum: 117  
BMI: BMI (kg/m2): 23 (10-31-22 @ 13:49)  HbA1C: A1C with Estimated Average Glucose Result: 5.3 % (11-01-22 @ 08:23)  TSH-2.12      Lipid Panel: Date/Time: 11-01-22 @ 08:23    Cholesterol, Serum: 184  Direct LDL: --92  HDL Cholesterol, Serum: 68  Triglycerides, Serum: 117  
BMI: BMI (kg/m2): 23 (10-31-22 @ 13:49)  HbA1c: A1C with Estimated Average Glucose Result: 5.3 % (11-01-22 @ 08:23)    Glucose:   BP: --  Lipid Panel: Date/Time: 11-01-22 @ 08:23  Cholesterol, Serum: 184  Direct LDL: --  HDL Cholesterol, Serum: 68  Total Cholesterol/HDL Ration Measurement: --  Triglycerides, Serum: 117  
BMI: BMI (kg/m2): 23 (10-31-22 @ 13:49)  HbA1c:     Lipid Panel: Date/Time: 11-01-22 @ 08:23  Cholesterol, Serum: 184  Direct LDL: 92  HDL Cholesterol, Serum: 68  Triglycerides, Serum: 117  
BMI: BMI (kg/m2): 23 (10-31-22 @ 13:49)  HbA1c: A1C with Estimated Average Glucose Result: 5.3 % (11-01-22 @ 08:23)    Glucose:   BP: --  Lipid Panel: Date/Time: 11-01-22 @ 08:23  Cholesterol, Serum: 184  Direct LDL: --  HDL Cholesterol, Serum: 68  Total Cholesterol/HDL Ration Measurement: --  Triglycerides, Serum: 117

## 2022-11-08 NOTE — BH INPATIENT PSYCHIATRY PROGRESS NOTE - NSTXPROBDCOTHR_PSY_ALL_CORE
DISCHARGE ISSUE - OTHER

## 2022-11-08 NOTE — BH INPATIENT PSYCHIATRY PROGRESS NOTE - NSBHASSESSSUMMFT_PSY_ALL_CORE
Patient a 61 y/o female, alert, oriented and is meds compliant and would like to know her issue or plan for her, she also gave the phone to the writer to speak with her  of 18 years Mr. Ramses George and is domiciled with him. As per her  she is under care of [facility] ACT team and she was last hospitalized at [facility] in , and since then she is under care of [facility] ACT team who was giving her Haldol Decanoate 100 mg IM/4 weeks and was doing very well with the Haldol Decanoate 100 mg IM 4 weeks. She recently informed the ACT team that she is too drowsy or has too much sleep so the ACT team decided to decrease her Haldol Decanoate to 75 mg IM, instead of Haldol 100 mg IM/4 weeks and she received her last Haldol Decanoate 75 mg IM on 10/15/2022 and since then she is delusional, endorses that her  , and the  and her daughter are trying to poison her and  wants her to get the meds adjusted for stability back to Haldol Decanoate 100 mg IM/4 weeks on which she did very well.    Plan: To start Low dose Haldol 1 mg BID           To start Cogentin 0.5 mg BID  
Patient a 63 y/o female, alert, oriented and is meds compliant and would like to know her issue or plan for her, she also gave the phone to the writer to speak with her  of 18 years Mr. Ramses George and is domiciled with him. As per her  she is under care of [facility] ACT team and she was last hospitalized at [facility] in , and since then she is under care of [facility] ACT team who was giving her Haldol Decanoate 100 mg IM/4 weeks and was doing very well with the Haldol Decanoate 100 mg IM 4 weeks. She recently informed the ACT team that she is too drowsy or has too much sleep so the ACT team decided to decrease her Haldol Decanoate to 75 mg IM, instead of Haldol 100 mg IM/4 weeks and she received her last Haldol Decanoate 75 mg IM on 10/15/2022 and since then she is delusional, endorses that her  , and the  and her daughter are trying to poison her and  wants her to get the meds adjusted for stability back to Haldol Decanoate 100 mg IM/4 weeks on which she did very well.    Plan: To start Low dose Haldol 1 mg BID           To start Cogentin 0.5 mg BID  
Patient a 63 y/o female, alert, oriented and is meds compliant and would like to know her issue or plan for her, she also gave the phone to the writer to speak with her  of 18 years Mr. Ramses George and is domiciled with him. As per her  she is under care of [facility] ACT team and she was last hospitalized at [facility] in , and since then she is under care of [facility] ACT team who was giving her Haldol Decanoate 100 mg IM/4 weeks and was doing very well with the Haldol Decanoate 100 mg IM 4 weeks. She recently informed the ACT team that she is too drowsy or has too much sleep so the ACT team decided to decrease her Haldol Decanoate to 75 mg IM, instead of Haldol 100 mg IM/4 weeks and she received her last Haldol Decanoate 75 mg IM on 10/15/2022 and since then she is delusional, endorses that her  , and the  and her daughter are trying to poison her and  wants her to get the meds adjusted for stability back to Haldol Decanoate 100 mg IM/4 weeks on which she did very well.    Plan: To start Low dose Haldol 1 mg BID          To start Cogentin 0.5 mg BID          She received Haldol Decanoate 75 mg IM on 10/15/2022           To receive Haldol Decanoate 100 mg IM on 2022   
Patient a 63 y/o female, alert, oriented and is meds compliant and would like to know her issue or plan for her, she also gave the phone to the writer to speak with her  of 18 years Mr. Ramses George and is domiciled with him. As per her  she is under care of [facility] ACT team and she was last hospitalized at [facility] in , and since then she is under care of [facility] ACT team who was giving her Haldol Decanoate 100 mg IM/4 weeks and was doing very well with the Haldol Decanoate 100 mg IM 4 weeks. She recently informed the ACT team that she is too drowsy or has too much sleep so the ACT team decided to decrease her Haldol Decanoate to 75 mg IM, instead of Haldol 100 mg IM/4 weeks and she received her last Haldol Decanoate 75 mg IM on 10/15/2022 and since then she is delusional, endorses that her  , and the  and her daughter are trying to poison her and  wants her to get the meds adjusted for stability back to Haldol Decanoate 100 mg IM/4 weeks on which she did very well.    Plan: To start Low dose Haldol 1 mg BID           To start Cogentin 0.5 mg BID  
Patient a 63 y/o female, alert, oriented and is meds compliant and would like to know her issue or plan for her, she also gave the phone to the writer to speak with her  of 18 years Mr. Ramses George and is domiciled with him. As per her  she is under care of [facility] ACT team and she was last hospitalized at [facility] in , and since then she is under care of [facility] ACT team who was giving her Haldol Decanoate 100 mg IM/4 weeks and was doing very well with the Haldol Decanoate 100 mg IM 4 weeks. She recently informed the ACT team that she is too drowsy or has too much sleep so the ACT team decided to decrease her Haldol Decanoate to 75 mg IM, instead of Haldol 100 mg IM/4 weeks and she received her last Haldol Decanoate 75 mg IM on 10/15/2022 and since then she is delusional, endorses that her  , and the  and her daughter are trying to poison her and  wants her to get the meds adjusted for stability back to Haldol Decanoate 100 mg IM/4 weeks on which she did very well.    Plan: To start Low dose Haldol 1 mg BID          To start Cogentin 0.5 mg BID          She received Haldol Decanoate 75 mg IM on 10/15/2022           To receive Haldol Decanoate 100 mg IM on 2022   
Patient a 61 y/o female, alert, oriented and is meds compliant and would like to know her issue or plan for her, she also gave the phone to the writer to speak with her  of 18 years Mr. Ramses George and is domiciled with him. As per her  she is under care of [facility] ACT team and she was last hospitalized at [facility] in , and since then she is under care of [facility] ACT team who was giving her Haldol Decanoate 100 mg IM/4 weeks and was doing very well with the Haldol Decanoate 100 mg IM 4 weeks. She recently informed the ACT team that she is too drowsy or has too much sleep so the ACT team decided to decrease her Haldol Decanoate to 75 mg IM, instead of Haldol 100 mg IM/4 weeks and she received her last Haldol Decanoate 75 mg IM on 10/15/2022 and since then she is delusional, endorses that her  , and the  and her daughter are trying to poison her and  wants her to get the meds adjusted for stability back to Haldol Decanoate 100 mg IM/4 weeks on which she did very well.    Plan: To start Low dose Haldol 1 mg BID          To start Cogentin 0.5 mg BID          She received Haldol Decanoate 75 mg IM on 10/15/2022          To receive Haldol Decanoate 100 mg IM on 2022   
Patient a 63 y/o female, alert, oriented and is meds compliant and would like to know her issue or plan for her, she also gave the phone to the writer to speak with her  of 18 years Mr. Ramses George and is domiciled with him. As per her  she is under care of [facility] ACT team and she was last hospitalized at [facility] in , and since then she is under care of [facility] ACT team who was giving her Haldol Decanoate 100 mg IM/4 weeks and was doing very well with the Haldol Decanoate 100 mg IM 4 weeks. She recently informed the ACT team that she is too drowsy or has too much sleep so the ACT team decided to decrease her Haldol Decanoate to 75 mg IM, instead of Haldol 100 mg IM/4 weeks and she received her last Haldol Decanoate 75 mg IM on 10/15/2022 and since then she is delusional, endorses that her  , and the  and her daughter are trying to poison her and  wants her to get the meds adjusted for stability back to Haldol Decanoate 100 mg IM/4 weeks on which she did very well.    Plan: To start Low dose Haldol 1 mg BID          To start Cogentin 0.5 mg BID          She received Haldol Decanoate 75 mg IM on 10/15/2022          To receive Haldol Decanoate 100 mg IM on 2022

## 2022-11-08 NOTE — BH INPATIENT PSYCHIATRY PROGRESS NOTE - NSTXDCOTHRGOAL_PSY_ALL_CORE
Patient will be referred to the appropriate level of outpatient treatment and have appointments within 3-5 days of discharge.
Patient will be referred to the appropriate level of outpatient treatment and have appointments within 3-5 days of discharge.  Patient will be discharged to safe housing either back home or DSS emergency housing.   will explore need for duel diagnosis tx with appointments if needed.  Benefits in place
Patient will be referred to the appropriate level of outpatient treatment and have appointments within 3-5 days of discharge.
Patient will be referred to the appropriate level of outpatient treatment and have appointments within 3-5 days of discharge.  Patient will be discharged to safe housing either back home or DSS emergency housing.   will explore need for duel diagnosis tx with appointments if needed.  Benefits in place
Patient will be referred to the appropriate level of outpatient treatment and have appointments within 3-5 days of discharge.
Patient will be referred to the appropriate level of outpatient treatment and have appointments within 3-5 days of discharge.  Patient will be discharged to safe housing either back home or DSS emergency housing.   will explore need for duel diagnosis tx with appointments if needed.  Benefits in place
Patient will be referred to the appropriate level of outpatient treatment and have appointments within 3-5 days of discharge.

## 2022-11-08 NOTE — BH INPATIENT PSYCHIATRY PROGRESS NOTE - NSTXDCOTHRINTERMD_PSY_ALL_CORE
F/U with [facility] ACT Team As per f/u protocol
F/U with [facility] ACT Team As per f/u protocol
F/U with [facility] ACT Team As per f/u protocol
F/U with [facility] ACT Team As per f/u protocol
F/U with [facility] ACT Team As per f/u protocol
F/U with [facility] ACT Team As per f/u protocol
F/U with [facility] ACT Team As per f/u protocol

## 2022-11-08 NOTE — BH INPATIENT PSYCHIATRY DISCHARGE NOTE - NSBHMETABOLIC_PSY_ALL_CORE_FT
BMI: BMI (kg/m2): 23 (10-31-22 @ 13:49)  HbA1c: A1C with Estimated Average Glucose Result: 5.3 % (11-01-22 @ 08:23)  TSH-2.12       Lipid Panel: Date/Time: 11-01-22 @ 08:23  Cholesterol, Serum: 184  Direct LDL: --92  HDL Cholesterol, Serum: 68  Triglycerides, Serum: 117

## 2022-11-08 NOTE — BH INPATIENT PSYCHIATRY DISCHARGE NOTE - HOSPITAL COURSE
Patient was admitted from [facility] involuntarily. She was meds compliant and was doing well on Haldol Decanoate 100 mg IM/4weeks, but she was fixated on having a lower dosages of Haldol and the ACT Team @ [facility] decided to lower her Haldol Decanoate to 75 mg IM/4 weeks, since her dosages was decreased she was symptomatic, she started to endorse that her   and was not sleeping with inappropriate answers to questions and the /daughter called 911 to come to ED at [facility] and was later transferred to . She is under care of [facility] ACT Team and she was also on Depakote ED 1000 mg HS. Writer started her on Depakote  mg BID, her WBC Count was on the lower side of normal and since starting Depakote DR, her WBC count went down further and Depakote was discontinued. She was only given Haldol 1 mg BID on top of the Haldol Decanoate 75 mg IM received from ACT team on 10/15/2022. She has limited hand tremors mostly the right hand and was on Cogentin 0.5 mg BID. There were no instances of any behavioral issues, not S/H and she never tried to commit suicide in the past. Her only medication is Haldol 1 mg BID with Cogentin 1 mg BID. Meds were sent to pharmacy as requested.    Medically has HTN controlled with Norvasc 10 mg daily. She also has Glaucoma in her eyes b/l and takes meds as prescribed.

## 2022-11-08 NOTE — BH INPATIENT PSYCHIATRY PROGRESS NOTE - NSBHFUPINTERVALCCFT_PSY_A_CORE
" I'm doing much better now "

## 2022-11-08 NOTE — BH INPATIENT PSYCHIATRY PROGRESS NOTE - NSBHATTESTTYPEVISIT_PSY_A_CORE
Attending Only

## 2022-11-08 NOTE — BH INPATIENT PSYCHIATRY PROGRESS NOTE - NSDCCRITERIA_PSY_ALL_CORE
Once stable with no Sandra/Psychosis-to go home to family  F/U with ACT team on discharge

## 2022-11-08 NOTE — BH INPATIENT PSYCHIATRY PROGRESS NOTE - NSTXDISORGINTERMD_PSY_ALL_CORE
To continue with Haldol 1 mg BID with Cogentin 0.5 mg BID--To improve thought, and deal with other social issues
To continue with Haldol 1 mg BID with Cogentin 0.5 mg BID--To improve thought and other social settings
To continue with Haldol 1 mg BID with Cogentin 0.5 mg BID--To improve thought, and deal with other social issues
To continue with Haldol 1 mg BID with Cogentin 0.5 mg BID--To improve thought, and deal with other social issues

## 2022-11-08 NOTE — BH INPATIENT PSYCHIATRY DISCHARGE NOTE - NSDCMRMEDTOKEN_GEN_ALL_CORE_FT
amLODIPine 10 mg oral tablet: 1 tab(s) orally once a day x 30 days   benztropine 0.5 mg oral tablet: 1 tab(s) orally 2 times a day x 30 days   Haldol Decanoate 50 mg/mL intramuscular solution: 100 milligram(s) intramuscular every 4 weeks  Next due on 11/13/2022  haloperidol 1 mg oral tablet: 1 tab(s) orally 2 times a day x 30 days

## 2022-11-08 NOTE — BH INPATIENT PSYCHIATRY PROGRESS NOTE - NSBHATTESTBILLINGAW_PSY_A_CORE
[id number]-Qclikmatfk Inpatient care - moderate complexity - 25 minutes
[id number]-Gghkqmcctl Inpatient care - moderate complexity - 25 minutes
06709-Ofrkmqfvza Inpatient care - moderate complexity - 25 minutes
[id number]-Fganswqlrv Inpatient care - moderate complexity - 25 minutes
26101-Scvcknpjss Inpatient care - moderate complexity - 25 minutes
90882-Wwyirjwdjq Inpatient care - moderate complexity - 25 minutes
84249-Rtfujmgosd Inpatient care - moderate complexity - 25 minutes

## 2022-11-08 NOTE — BH INPATIENT PSYCHIATRY PROGRESS NOTE - NSBHCHARTREVIEWVS_PSY_A_CORE FT
Vital Signs Last 24 Hrs  T(C): 36.6 (11-08-22 @ 07:13), Max: 36.6 (11-08-22 @ 07:13)  T(F): 97.9 (11-08-22 @ 07:13), Max: 97.9 (11-08-22 @ 07:13)  HR: --  BP: --  BP(mean): --  RR: 16 (11-08-22 @ 07:13) (16 - 16)  SpO2: 98% (11-08-22 @ 07:13) (98% - 98%)    Orthostatic VS  11-08-22 @ 07:13  Lying BP: --/-- HR: --  Sitting BP: 140/74 HR: 88  Standing BP: 148/74 HR: 92  Site: upper right arm  Mode: electronic  Orthostatic VS  11-07-22 @ 07:11  Lying BP: --/-- HR: --  Sitting BP: 158/84 HR: 83  Standing BP: 136/77 HR: 56  Site: upper right arm  Mode: electronic  
Vital Signs Last 24 Hrs  T(C): 36.7 (11-05-22 @ 07:22), Max: 36.7 (11-05-22 @ 07:22)  T(F): 98 (11-05-22 @ 07:22), Max: 98 (11-05-22 @ 07:22)  HR: --  BP: --  BP(mean): --  RR: 17 (11-05-22 @ 07:22) (17 - 17)  SpO2: 100% (11-05-22 @ 07:22) (100% - 100%)    Orthostatic VS  11-05-22 @ 07:22  Lying BP: --/-- HR: --  Sitting BP: 128/72 HR: 58  Standing BP: 129/74 HR: 65  Site: upper right arm  Mode: electronic  Orthostatic VS  11-04-22 @ 06:57  Lying BP: --/-- HR: --  Sitting BP: 150/73 HR: 58  Standing BP: 136/63 HR: 65  Site: upper right arm  Mode: electronic  
Vital Signs Last 24 Hrs  T(C): 36.8 (11-03-22 @ 07:17), Max: 36.8 (11-03-22 @ 07:17)  T(F): 98.2 (11-03-22 @ 07:17), Max: 98.2 (11-03-22 @ 07:17)  HR: --  BP: --  BP(mean): --  RR: 18 (11-03-22 @ 07:17) (18 - 18)  SpO2: 99% (11-03-22 @ 07:17) (99% - 99%)    Orthostatic VS  11-03-22 @ 07:17  Lying BP: --/-- HR: --  Sitting BP: 123/82 HR: 102  Standing BP: 142/70 HR: 89  Site: upper right arm  Mode: electronic  Orthostatic VS  11-02-22 @ 07:50  Lying BP: --/-- HR: --  Sitting BP: 143/72 HR: 68  Standing BP: 124/55 HR: 80  Site: upper right arm  Mode: electronic  
Vital Signs Last 24 Hrs  T(C): 36.7 (11-04-22 @ 06:57), Max: 36.7 (11-04-22 @ 06:57)  T(F): 98 (11-04-22 @ 06:57), Max: 98 (11-04-22 @ 06:57)  HR: --  BP: --  BP(mean): --  RR: 16 (11-04-22 @ 06:57) (16 - 16)  SpO2: 100% (11-04-22 @ 06:57) (100% - 100%)    Orthostatic VS  11-04-22 @ 06:57  Lying BP: --/-- HR: --  Sitting BP: 150/73 HR: 58  Standing BP: 136/63 HR: 65  Site: upper right arm  Mode: electronic  Orthostatic VS  11-03-22 @ 07:17  Lying BP: --/-- HR: --  Sitting BP: 123/82 HR: 102  Standing BP: 142/70 HR: 89  Site: upper right arm  Mode: electronic  
Vital Signs Last 24 Hrs  T(C): 36.5 (11-07-22 @ 07:11), Max: 36.5 (11-07-22 @ 07:11)  T(F): 97.7 (11-07-22 @ 07:11), Max: 97.7 (11-07-22 @ 07:11)  HR: --  BP: --  BP(mean): --  RR: 18 (11-07-22 @ 07:11) (18 - 18)  SpO2: 99% (11-07-22 @ 07:11) (99% - 99%)    Orthostatic VS  11-07-22 @ 07:11  Lying BP: --/-- HR: --  Sitting BP: 158/84 HR: 83  Standing BP: 136/77 HR: 56  Site: upper right arm  Mode: electronic  Orthostatic VS  11-06-22 @ 07:05  Lying BP: --/-- HR: --  Sitting BP: 108/80 HR: 74  Standing BP: 121/94 HR: 88  Site: upper right arm  Mode: electronic  
Vital Signs Last 24 Hrs  T(C): 36.6 (11-02-22 @ 07:50), Max: 36.6 (11-02-22 @ 07:50)  T(F): 97.8 (11-02-22 @ 07:50), Max: 97.8 (11-02-22 @ 07:50)  HR: --  BP: --  BP(mean): --  RR: 16 (11-02-22 @ 07:50) (16 - 16)  SpO2: 98% (11-02-22 @ 07:50) (98% - 98%)    Orthostatic VS  11-02-22 @ 07:50  Lying BP: --/-- HR: --  Sitting BP: 143/72 HR: 68  Standing BP: 124/55 HR: 80  Site: upper right arm  Mode: electronic  Orthostatic VS  11-01-22 @ 06:54  Lying BP: --/-- HR: --  Sitting BP: 152/80 HR: 62  Standing BP: 140/71 HR: 71  Site: upper left arm  Mode: electronic  
Vital Signs Last 24 Hrs  T(C): 36.4 (11-01-22 @ 06:54), Max: 36.4 (11-01-22 @ 06:54)  T(F): 97.5 (11-01-22 @ 06:54), Max: 97.5 (11-01-22 @ 06:54)  HR: --  BP: --  BP(mean): --  RR: 16 (11-01-22 @ 06:54) (16 - 16)  SpO2: 100% (11-01-22 @ 06:54) (100% - 100%)    Orthostatic VS  11-01-22 @ 06:54  Lying BP: --/-- HR: --  Sitting BP: 152/80 HR: 62  Standing BP: 140/71 HR: 71  Site: upper left arm  Mode: electronic

## 2022-11-08 NOTE — BH INPATIENT PSYCHIATRY DISCHARGE NOTE - NSDCCPCAREPLAN_GEN_ALL_CORE_FT
PRINCIPAL DISCHARGE DIAGNOSIS  Diagnosis: Severe bipolar affective disorder with psychosis  Assessment and Plan of Treatment: Haldol 1 mg BID; Cogentin 0.5 mg BID  Haldol Decanoate 75 mg IM received on 10/15/2022

## 2022-11-08 NOTE — BH INPATIENT PSYCHIATRY PROGRESS NOTE - NSBHCONSBHPROVDETAILS_PSY_A_CORE  FT
[facility]-[phone]
[facility]-[phone]
[facility]-[phone]
[facility]-[phone]
[facility]-[phone]
[facility]-[phone]
[facility]-[phone]

## 2022-11-08 NOTE — BH INPATIENT PSYCHIATRY DISCHARGE NOTE - OTHER PAST PSYCHIATRIC HISTORY (INCLUDE DETAILS REGARDING ONSET, COURSE OF ILLNESS, INPATIENT/OUTPATIENT TREATMENT)
Pt reports that she has had numerous hospitalizations. Per ED assessment, "PPH Bipolar disorder in tx with [facility] team,  [phone], prior AOT order which , last psych admission approx 2 yrs at [facility] for 2 weeks following son being murdered by [employer], the year before, no prior suicide attempt, no h/o violence, no drug or alcohol use smokes PPD per records"

## 2022-11-08 NOTE — BH INPATIENT PSYCHIATRY PROGRESS NOTE - PRN MEDS
MEDICATIONS  (PRN):  LORazepam     Tablet 1 milliGRAM(s) Oral every 6 hours PRN Mod Agitation  

## 2022-11-08 NOTE — BH INPATIENT PSYCHIATRY PROGRESS NOTE - NSTXPSYCHOGOAL_PSY_ALL_CORE
Will identify 2 coping skills that help mitigate hallucinations

## 2022-11-08 NOTE — BH INPATIENT PSYCHIATRY PROGRESS NOTE - NSTXCOPEINTERMD_PSY_ALL_CORE
She received Haldol Decanoate 75 mg IM on 10/15/2022, since then she was more delusional and now to add Haldol 1 mg BID with Cogentin 0.5 mg BID.  Depakote 500 mg HS depending upon blood work, as her cbc-wbc is less in count.
She received Haldol Decanoate 75 mg IM on 10/15/2022, since then she was more delusional and now to add Haldol 1 mg BID with Cogentin 0.5 mg BID.  Depakote 500 mg HS depending upon blood work, as her cbc-wbc is less in count.
She received Haldol Decanoate 75 mg Im on 10/15/2022, since then she was more delusional and now to add Haldol 1 mg BID with Cogentin 0.5 mg BID
She received Haldol Decanoate 75 mg IM on 10/15/2022, since then she was more delusional and now to add Haldol 1 mg BID with Cogentin 0.5 mg BID.  Depakote 500 mg HS depending upon blood work, as her cbc-wbc is less in count.

## 2022-11-08 NOTE — BH INPATIENT PSYCHIATRY PROGRESS NOTE - NSBHROSSYSTEMS_PSY_ALL_CORE
Psychiatric
Psychiatric
Eyes.../Cardiovascular.../Genitourinary.../Psychiatric
Psychiatric
Cardiovascular.../Psychiatric

## 2022-11-08 NOTE — BH INPATIENT PSYCHIATRY PROGRESS NOTE - NSBHADMITMEDEDUDETAILS_A_CORE FT
Discussed haldol and already taking and has no concern for it

## 2022-11-08 NOTE — BH INPATIENT PSYCHIATRY PROGRESS NOTE - NSBHFUPINTERVALHXFT_PSY_A_CORE
2022: Patient was seen today AM, chart reviewed and discussed in team. Mood in control with no acute psychotic or other behavioral issues. Writer also spoke with her  and he believes that she is also better and has not commented about any beliefs he mentioned earlier. Not S/h and to receive Haldol 2 mg daily after discharge. Writer also spoke with team at [facility] and informed of Depakote Induced low WBC Count and plans to discontinue Depakote. CBC still pending.     2022 : Patient was seen today AM, chart reviewed and discussed in team. She is also meds complaint, started her on Depakote  mg BID, was told to repeat Depakote level on Monday with CBC. Mood in better control now, denied delusional beliefs about her . Good sleep/appetite, no aggression/agitation. To continue current meds for stability/safety. [facility] aware about discharge on Wednesday. She received Haldol Decanoate 75 mg IM on 10/15/2022, next dosage is due on 2022. Plan to give her Haldol Decanoate 100 mg IM/4 weeks    2022: Patient was seen today AM, chart reviewed and discussed in team today. Mood in control, no aggression or agitation, pleasant on approach and takes meds as prescribed. Overall seems significantly improved with no delusional beliefs now. Writer did speak with VISHAL Brown at [facility] and she is also on Depakote 1000 mg HS, she was started Depakote  mg BID. Her WBC Count was low, white count was repeated, repeat count was still low, on the upper limit of low normal, Depakote  mg x 1 dose given. To continue other meds as ordered.    Patient a 63 y/o female, alert, oriented and is meds compliant and would like to know her issue or plan for her, she also gave the phone to the writer to speak with her  of 18 years Mr. Ramses George and is domiciled with him. As per her  she is under care of [facility] ACT team and she was last hospitalized at [facility] in , and since then she is under care of [facility] ACT team who was giving her Haldol Decanoate 100 mg IM/4 weeks and was doing very well with the Haldol Decanoate 100 mg IM 4 weeks. She recently informed the ACT team that she is too drowsy or has too much sleep so the ACT team decided to decrease her Haldol Decanoate to 75 mg IM, instead of Haldol 100 mg IM/4 weeks and she received her last Haldol Decanoate 75 mg IM on 10/15/2022 and since then she is delusional, endorses that her  , and the  and her daughter are trying to poison her and  wants her to get the meds adjusted for stability back to Haldol Decanoate 100 mg IM/4 weeks on which she did very well.    Plan: To start Low dose Haldol 1 mg BID           To start Cogentin 0.5 mg BID  Once stable with no Sandra/Psychosis-to go home to family  F/U with ACT team on discharge

## 2022-11-15 DIAGNOSIS — E87.5 HYPERKALEMIA: ICD-10-CM

## 2022-11-15 DIAGNOSIS — F31.89 OTHER BIPOLAR DISORDER: ICD-10-CM

## 2022-11-15 DIAGNOSIS — N18.30 CHRONIC KIDNEY DISEASE, STAGE 3 UNSPECIFIED: ICD-10-CM

## 2022-11-15 DIAGNOSIS — H40.9 UNSPECIFIED GLAUCOMA: ICD-10-CM

## 2022-11-15 DIAGNOSIS — Z79.82 LONG TERM (CURRENT) USE OF ASPIRIN: ICD-10-CM

## 2022-11-15 DIAGNOSIS — I12.9 HYPERTENSIVE CHRONIC KIDNEY DISEASE WITH STAGE 1 THROUGH STAGE 4 CHRONIC KIDNEY DISEASE, OR UNSPECIFIED CHRONIC KIDNEY DISEASE: ICD-10-CM

## 2022-11-15 DIAGNOSIS — N39.0 URINARY TRACT INFECTION, SITE NOT SPECIFIED: ICD-10-CM

## 2022-11-15 DIAGNOSIS — N17.9 ACUTE KIDNEY FAILURE, UNSPECIFIED: ICD-10-CM

## 2022-11-15 DIAGNOSIS — Z85.3 PERSONAL HISTORY OF MALIGNANT NEOPLASM OF BREAST: ICD-10-CM

## 2022-11-15 DIAGNOSIS — F17.210 NICOTINE DEPENDENCE, CIGARETTES, UNCOMPLICATED: ICD-10-CM

## 2022-11-15 DIAGNOSIS — I51.7 CARDIOMEGALY: ICD-10-CM

## 2022-11-28 ENCOUNTER — LABORATORY RESULT (OUTPATIENT)
Age: 62
End: 2022-11-28

## 2022-11-28 ENCOUNTER — APPOINTMENT (OUTPATIENT)
Dept: FAMILY MEDICINE | Facility: CLINIC | Age: 62
End: 2022-11-28

## 2022-11-28 VITALS
TEMPERATURE: 98.1 F | SYSTOLIC BLOOD PRESSURE: 144 MMHG | HEART RATE: 79 BPM | WEIGHT: 143 LBS | HEIGHT: 65 IN | RESPIRATION RATE: 16 BRPM | BODY MASS INDEX: 23.82 KG/M2 | OXYGEN SATURATION: 93 % | DIASTOLIC BLOOD PRESSURE: 72 MMHG

## 2022-11-28 DIAGNOSIS — M54.12 RADICULOPATHY, CERVICAL REGION: ICD-10-CM

## 2022-11-28 DIAGNOSIS — M54.2 CERVICALGIA: ICD-10-CM

## 2022-11-28 DIAGNOSIS — M54.50 LOW BACK PAIN, UNSPECIFIED: ICD-10-CM

## 2022-11-28 DIAGNOSIS — Z12.11 ENCOUNTER FOR SCREENING FOR MALIGNANT NEOPLASM OF COLON: ICD-10-CM

## 2022-11-28 DIAGNOSIS — M54.17 RADICULOPATHY, LUMBOSACRAL REGION: ICD-10-CM

## 2022-11-28 PROCEDURE — 36415 COLL VENOUS BLD VENIPUNCTURE: CPT

## 2022-11-28 PROCEDURE — 99214 OFFICE O/P EST MOD 30 MIN: CPT | Mod: 25

## 2022-11-29 LAB
25(OH)D3 SERPL-MCNC: 19 NG/ML
ALBUMIN SERPL ELPH-MCNC: 3.5 G/DL
ALP BLD-CCNC: 83 U/L
ALT SERPL-CCNC: 10 U/L
ANION GAP SERPL CALC-SCNC: 11 MMOL/L
AST SERPL-CCNC: 14 U/L
BASOPHILS # BLD AUTO: 0.01 K/UL
BASOPHILS NFR BLD AUTO: 0.3 %
BILIRUB SERPL-MCNC: 0.2 MG/DL
BUN SERPL-MCNC: 26 MG/DL
C PEPTIDE SERPL-MCNC: 6.6 NG/ML
CALCIUM SERPL-MCNC: 9.3 MG/DL
CHLORIDE SERPL-SCNC: 106 MMOL/L
CHOLEST SERPL-MCNC: 192 MG/DL
CO2 SERPL-SCNC: 21 MMOL/L
COVID-19 NUCLEOCAPSID  GAM ANTIBODY INTERPRETATION: NEGATIVE
COVID-19 SPIKE DOMAIN ANTIBODY INTERPRETATION: POSITIVE
CREAT SERPL-MCNC: 1.52 MG/DL
CREAT SPEC-SCNC: 107 MG/DL
EGFR: 39 ML/MIN/1.73M2
EOSINOPHIL # BLD AUTO: 0.06 K/UL
EOSINOPHIL NFR BLD AUTO: 1.7 %
ESTIMATED AVERAGE GLUCOSE: 100 MG/DL
FERRITIN SERPL-MCNC: 155 NG/ML
FOLATE SERPL-MCNC: 8.9 NG/ML
GLUCOSE SERPL-MCNC: 94 MG/DL
HBA1C MFR BLD HPLC: 5.1 %
HCT VFR BLD CALC: 37.5 %
HDLC SERPL-MCNC: 65 MG/DL
HGB BLD-MCNC: 12.9 G/DL
IMM GRANULOCYTES NFR BLD AUTO: 0.3 %
IRON SATN MFR SERPL: 17 %
IRON SERPL-MCNC: 53 UG/DL
LDLC SERPL CALC-MCNC: 105 MG/DL
LYMPHOCYTES # BLD AUTO: 1.01 K/UL
LYMPHOCYTES NFR BLD AUTO: 29.2 %
MAN DIFF?: NORMAL
MCHC RBC-ENTMCNC: 30.4 PG
MCHC RBC-ENTMCNC: 34.4 GM/DL
MCV RBC AUTO: 88.2 FL
MICROALBUMIN 24H UR DL<=1MG/L-MCNC: 3.5 MG/DL
MICROALBUMIN/CREAT 24H UR-RTO: 33 MG/G
MONOCYTES # BLD AUTO: 0.47 K/UL
MONOCYTES NFR BLD AUTO: 13.6 %
NEUTROPHILS # BLD AUTO: 1.9 K/UL
NEUTROPHILS NFR BLD AUTO: 54.9 %
NONHDLC SERPL-MCNC: 128 MG/DL
PLATELET # BLD AUTO: 254 K/UL
POTASSIUM SERPL-SCNC: 4.3 MMOL/L
PROT SERPL-MCNC: 8.3 G/DL
RBC # BLD: 4.25 M/UL
RBC # FLD: 14.7 %
SARS-COV-2 AB SERPL IA-ACNC: >250 U/ML
SARS-COV-2 AB SERPL QL IA: 0.08 INDEX
SODIUM SERPL-SCNC: 138 MMOL/L
T4 SERPL-MCNC: 11 UG/DL
TIBC SERPL-MCNC: 304 UG/DL
TRIGL SERPL-MCNC: 116 MG/DL
TSH SERPL-ACNC: 1.43 UIU/ML
UIBC SERPL-MCNC: 251 UG/DL
VIT B12 SERPL-MCNC: 318 PG/ML
WBC # FLD AUTO: 3.46 K/UL

## 2022-12-01 ENCOUNTER — APPOINTMENT (OUTPATIENT)
Dept: RADIOLOGY | Facility: CLINIC | Age: 62
End: 2022-12-01

## 2022-12-01 ENCOUNTER — OUTPATIENT (OUTPATIENT)
Dept: OUTPATIENT SERVICES | Facility: HOSPITAL | Age: 62
LOS: 1 days | End: 2022-12-01
Payer: MEDICARE

## 2022-12-01 ENCOUNTER — APPOINTMENT (OUTPATIENT)
Dept: MRI IMAGING | Facility: CLINIC | Age: 62
End: 2022-12-01

## 2022-12-01 DIAGNOSIS — H26.9 UNSPECIFIED CATARACT: Chronic | ICD-10-CM

## 2022-12-01 DIAGNOSIS — M54.12 RADICULOPATHY, CERVICAL REGION: ICD-10-CM

## 2022-12-01 LAB
APPEARANCE: ABNORMAL
BILIRUBIN URINE: NEGATIVE
BLOOD URINE: ABNORMAL
COLOR: YELLOW
GLUCOSE QUALITATIVE U: NEGATIVE
KETONES URINE: NEGATIVE
LEUKOCYTE ESTERASE URINE: ABNORMAL
NITRITE URINE: NEGATIVE
PH URINE: 6
PROTEIN URINE: ABNORMAL
SPECIFIC GRAVITY URINE: 1.02
URINE CULTURE <10: ABNORMAL
UROBILINOGEN URINE: NORMAL

## 2022-12-01 PROCEDURE — 72141 MRI NECK SPINE W/O DYE: CPT | Mod: 26,MH

## 2022-12-01 PROCEDURE — 72100 X-RAY EXAM L-S SPINE 2/3 VWS: CPT | Mod: 26

## 2022-12-01 PROCEDURE — 72141 MRI NECK SPINE W/O DYE: CPT

## 2022-12-01 PROCEDURE — 72100 X-RAY EXAM L-S SPINE 2/3 VWS: CPT

## 2022-12-01 PROCEDURE — 72148 MRI LUMBAR SPINE W/O DYE: CPT | Mod: 26,MH

## 2022-12-01 PROCEDURE — 72040 X-RAY EXAM NECK SPINE 2-3 VW: CPT | Mod: 26

## 2022-12-01 PROCEDURE — 72148 MRI LUMBAR SPINE W/O DYE: CPT

## 2022-12-01 PROCEDURE — 72040 X-RAY EXAM NECK SPINE 2-3 VW: CPT

## 2022-12-19 ENCOUNTER — APPOINTMENT (OUTPATIENT)
Dept: FAMILY MEDICINE | Facility: CLINIC | Age: 62
End: 2022-12-19

## 2022-12-19 VITALS
HEART RATE: 65 BPM | WEIGHT: 145 LBS | RESPIRATION RATE: 14 BRPM | HEIGHT: 65 IN | OXYGEN SATURATION: 96 % | TEMPERATURE: 97 F | DIASTOLIC BLOOD PRESSURE: 80 MMHG | SYSTOLIC BLOOD PRESSURE: 124 MMHG | BODY MASS INDEX: 24.16 KG/M2

## 2022-12-19 PROCEDURE — 99214 OFFICE O/P EST MOD 30 MIN: CPT

## 2023-01-20 ENCOUNTER — OFFICE (OUTPATIENT)
Dept: URBAN - METROPOLITAN AREA CLINIC 94 | Facility: CLINIC | Age: 63
Setting detail: OPHTHALMOLOGY
End: 2023-01-20
Payer: MEDICARE

## 2023-01-20 DIAGNOSIS — Z96.1: ICD-10-CM

## 2023-01-20 DIAGNOSIS — H04.121: ICD-10-CM

## 2023-01-20 DIAGNOSIS — H04.122: ICD-10-CM

## 2023-01-20 DIAGNOSIS — H04.123: ICD-10-CM

## 2023-01-20 DIAGNOSIS — H40.1133: ICD-10-CM

## 2023-01-20 PROCEDURE — 92014 COMPRE OPH EXAM EST PT 1/>: CPT | Performed by: OPHTHALMOLOGY

## 2023-01-20 PROCEDURE — 92083 EXTENDED VISUAL FIELD XM: CPT | Performed by: OPHTHALMOLOGY

## 2023-01-20 ASSESSMENT — AXIALLENGTH_DERIVED
OD_AL: 24.8077
OS_AL: 24.6381
OS_AL: 24.7448
OD_AL: 24.754
OD_AL: 24.5413
OD_AL: 24.8077
OS_AL: 25.1809

## 2023-01-20 ASSESSMENT — REFRACTION_MANIFEST
OD_ADD: +2.50
OS_ADD: +2.50
OD_VA1: 20/20
OD_SPHERE: +0.50
OD_SPHERE: +0.25
OD_AXIS: 080
OS_VA1: 20/20-1
OS_CYLINDER: -0.25
OD_AXIS: 070
OD_CYLINDER: -0.75
OD_VA1: 20/25
OS_SPHERE: -0.25
OD_CYLINDER: -1.50
OS_CYLINDER: -0.25
OS_AXIS: 095
OS_VA2: 20/20(J1+)
OD_ADD: +2.25
OD_SPHERE: PLANO
OS_VA1: 20/40
OD_CYLINDER: -1.25
OS_AXIS: 092
OS_SPHERE: PLANO
OS_CYLINDER: -1.25
OD_VA1: 20/30
OD_VA2: 20/20(J1+)
OS_SPHERE: PLANO
OS_ADD: +2.25
OD_AXIS: 089
OD_VA1: 20/25
OD_CYLINDER: -1.75
OS_AXIS: 092
OS_VA1: 20/25
OS_AXIS: 180
OS_VA1: 20/20
OD_AXIS: 080
OS_CYLINDER: -0.50
OS_SPHERE: +0.25
OD_SPHERE: +0.50

## 2023-01-20 ASSESSMENT — REFRACTION_CURRENTRX
OS_OVR_VA: 20/
OD_AXIS: 092
OD_CYLINDER: -2.50
OS_SPHERE: PLANO
OS_VPRISM_DIRECTION: SV
OD_SPHERE: PLANO
OS_CYLINDER: -0.25
OS_ADD: +2.25
OD_OVR_VA: 20/
OS_OVR_VA: 20/
OS_CYLINDER: -2.50
OS_AXIS: 089
OD_AXIS: 078
OD_CYLINDER: -0.75
OD_OVR_VA: 20/
OD_SPHERE: +2.25
OS_VPRISM_DIRECTION: PROGS
OS_SPHERE: +2.25
OD_VPRISM_DIRECTION: PROGS
OD_ADD: +2.25
OD_VPRISM_DIRECTION: SV
OS_AXIS: 092

## 2023-01-20 ASSESSMENT — REFRACTION_AUTOREFRACTION
OS_AXIS: 096
OD_SPHERE: +1.00
OS_CYLINDER: -0.75
OS_SPHERE: +0.75
OD_CYLINDER: -1.50
OD_AXIS: 085

## 2023-01-20 ASSESSMENT — SPHEQUIV_DERIVED
OD_SPHEQUIV: -0.375
OD_SPHEQUIV: 0.25
OD_SPHEQUIV: -0.375
OD_SPHEQUIV: -0.25
OS_SPHEQUIV: 0.125
OS_SPHEQUIV: 0.375
OS_SPHEQUIV: -0.875

## 2023-01-20 ASSESSMENT — PACHYMETRY
OD_CT_CORRECTION: 2
OS_CT_CORRECTION: 3
OS_CT_UM: 505
OD_CT_UM: 514

## 2023-01-20 ASSESSMENT — KERATOMETRY
OS_K2POWER_DIOPTERS: 40.50
METHOD_AUTO_MANUAL: AUTO
OS_K1POWER_DIOPTERS: 40.25
OD_K2POWER_DIOPTERS: 41.25
OD_AXISANGLE_DEGREES: 178
OD_K1POWER_DIOPTERS: 40.25
OS_AXISANGLE_DEGREES: 170

## 2023-01-20 ASSESSMENT — CONFRONTATIONAL VISUAL FIELD TEST (CVF)
OS_FINDINGS: FULL
OD_FINDINGS: FULL

## 2023-01-20 ASSESSMENT — VISUAL ACUITY
OS_BCVA: 20/30
OD_BCVA: 20/25-1

## 2023-01-20 ASSESSMENT — TONOMETRY
OD_IOP_MMHG: 15
OS_IOP_MMHG: 12

## 2023-02-10 ENCOUNTER — APPOINTMENT (OUTPATIENT)
Dept: OBGYN | Facility: CLINIC | Age: 63
End: 2023-02-10
Payer: COMMERCIAL

## 2023-02-10 VITALS
DIASTOLIC BLOOD PRESSURE: 88 MMHG | BODY MASS INDEX: 22.49 KG/M2 | WEIGHT: 135 LBS | HEIGHT: 65 IN | SYSTOLIC BLOOD PRESSURE: 130 MMHG

## 2023-02-10 DIAGNOSIS — B37.31 ACUTE CANDIDIASIS OF VULVA AND VAGINA: ICD-10-CM

## 2023-02-10 PROCEDURE — 99072 ADDL SUPL MATRL&STAF TM PHE: CPT

## 2023-02-10 PROCEDURE — 99213 OFFICE O/P EST LOW 20 MIN: CPT

## 2023-02-10 RX ORDER — FLUCONAZOLE 150 MG/1
150 TABLET ORAL
Qty: 2 | Refills: 0 | Status: ACTIVE | COMMUNITY
Start: 2023-02-10 | End: 1900-01-01

## 2023-02-12 NOTE — PHYSICAL EXAM
[Chaperone Present] : A chaperone was present in the examining room during all aspects of the physical examination [Awake] : awake [Alert] : alert [Soft] : soft [Oriented x3] : oriented to person, place, and time [Normal] : uterus [Discharge] : a  ~M vaginal discharge was present [Moderate] : moderate [Thick] : thick [No Bleeding] : there was no active vaginal bleeding [Uterine Adnexae] : were not tender and not enlarged [Acute Distress] : no acute distress [Tender] : non tender

## 2023-02-13 LAB
CANDIDA VAG CYTO: NOT DETECTED
G VAGINALIS+PREV SP MTYP VAG QL MICRO: DETECTED
T VAGINALIS VAG QL WET PREP: NOT DETECTED

## 2023-02-13 RX ORDER — METRONIDAZOLE 7.5 MG/G
0.75 GEL VAGINAL
Qty: 1 | Refills: 0 | Status: ACTIVE | COMMUNITY
Start: 2021-09-20 | End: 1900-01-01

## 2023-03-23 ENCOUNTER — RX RENEWAL (OUTPATIENT)
Age: 63
End: 2023-03-23

## 2023-03-28 ENCOUNTER — APPOINTMENT (OUTPATIENT)
Dept: FAMILY MEDICINE | Facility: CLINIC | Age: 63
End: 2023-03-28

## 2023-04-17 ENCOUNTER — APPOINTMENT (OUTPATIENT)
Dept: FAMILY MEDICINE | Facility: CLINIC | Age: 63
End: 2023-04-17

## 2023-05-26 ENCOUNTER — OFFICE (OUTPATIENT)
Dept: URBAN - METROPOLITAN AREA CLINIC 94 | Facility: CLINIC | Age: 63
Setting detail: OPHTHALMOLOGY
End: 2023-05-26
Payer: MEDICARE

## 2023-05-26 DIAGNOSIS — H40.1133: ICD-10-CM

## 2023-05-26 DIAGNOSIS — Z96.1: ICD-10-CM

## 2023-05-26 DIAGNOSIS — H04.121: ICD-10-CM

## 2023-05-26 DIAGNOSIS — H04.122: ICD-10-CM

## 2023-05-26 DIAGNOSIS — H04.123: ICD-10-CM

## 2023-05-26 PROCEDURE — 92250 FUNDUS PHOTOGRAPHY W/I&R: CPT | Performed by: OPHTHALMOLOGY

## 2023-05-26 PROCEDURE — 92020 GONIOSCOPY: CPT | Performed by: OPHTHALMOLOGY

## 2023-05-26 PROCEDURE — 99213 OFFICE O/P EST LOW 20 MIN: CPT | Performed by: OPHTHALMOLOGY

## 2023-05-26 ASSESSMENT — REFRACTION_MANIFEST
OS_AXIS: 092
OS_CYLINDER: -0.50
OS_SPHERE: -0.25
OD_CYLINDER: -1.75
OS_VA2: 20/20(J1+)
OS_VA1: 20/20-1
OD_VA1: 20/25
OS_VA1: 20/25
OD_SPHERE: +0.50
OD_VA2: 20/20(J1+)
OS_ADD: +2.25
OD_CYLINDER: -0.75
OS_CYLINDER: -0.25
OS_CYLINDER: -1.25
OD_CYLINDER: -1.25
OS_SPHERE: PLANO
OS_ADD: +2.50
OS_CYLINDER: -0.25
OD_SPHERE: +0.25
OS_AXIS: 092
OD_SPHERE: PLANO
OD_SPHERE: +0.50
OD_AXIS: 070
OS_VA1: 20/20
OS_AXIS: 095
OD_CYLINDER: -1.50
OS_SPHERE: PLANO
OS_AXIS: 180
OD_VA1: 20/25
OD_ADD: +2.50
OS_VA1: 20/40
OS_SPHERE: +0.25
OD_AXIS: 089
OD_AXIS: 080
OD_AXIS: 080
OD_VA1: 20/20
OD_VA1: 20/30
OD_ADD: +2.25

## 2023-05-26 ASSESSMENT — REFRACTION_AUTOREFRACTION
OS_SPHERE: +0.50
OD_SPHERE: +0.75
OD_CYLINDER: -2.25
OD_AXIS: 077
OS_CYLINDER: -0.50
OS_AXIS: 084

## 2023-05-26 ASSESSMENT — SPHEQUIV_DERIVED
OD_SPHEQUIV: -0.375
OS_SPHEQUIV: -0.875
OD_SPHEQUIV: -0.25
OS_SPHEQUIV: 0.125
OS_SPHEQUIV: 0.25
OD_SPHEQUIV: -0.375
OD_SPHEQUIV: -0.375

## 2023-05-26 ASSESSMENT — PACHYMETRY
OD_CT_UM: 514
OD_CT_CORRECTION: 2
OS_CT_CORRECTION: 3
OS_CT_UM: 505

## 2023-05-26 ASSESSMENT — AXIALLENGTH_DERIVED
OD_AL: 24.8586
OS_AL: 24.7448
OD_AL: 24.8046
OD_AL: 24.8586
OS_AL: 25.1809
OS_AL: 24.6913
OD_AL: 24.8586

## 2023-05-26 ASSESSMENT — REFRACTION_CURRENTRX
OD_SPHERE: PLANO
OD_AXIS: 092
OD_OVR_VA: 20/
OS_ADD: +2.25
OS_OVR_VA: 20/
OD_OVR_VA: 20/
OD_AXIS: 078
OS_CYLINDER: -2.50
OS_AXIS: 089
OD_VPRISM_DIRECTION: SV
OS_SPHERE: +2.25
OD_SPHERE: +2.25
OS_CYLINDER: -0.25
OS_OVR_VA: 20/
OS_VPRISM_DIRECTION: SV
OD_VPRISM_DIRECTION: PROGS
OD_ADD: +2.25
OS_VPRISM_DIRECTION: PROGS
OS_SPHERE: PLANO
OS_AXIS: 092
OD_CYLINDER: -2.50
OD_CYLINDER: -0.75

## 2023-05-26 ASSESSMENT — KERATOMETRY
OS_AXISANGLE_DEGREES: 156
OD_K2POWER_DIOPTERS: 41.50
METHOD_AUTO_MANUAL: AUTO
OD_K1POWER_DIOPTERS: 39.75
OD_AXISANGLE_DEGREES: 174
OS_K1POWER_DIOPTERS: 40.25
OS_K2POWER_DIOPTERS: 40.50

## 2023-05-26 ASSESSMENT — TONOMETRY
OS_IOP_MMHG: 18
OD_IOP_MMHG: 18

## 2023-05-26 ASSESSMENT — CONFRONTATIONAL VISUAL FIELD TEST (CVF)
OD_FINDINGS: FULL
OS_FINDINGS: FULL

## 2023-05-26 ASSESSMENT — VISUAL ACUITY
OD_BCVA: 20/20
OS_BCVA: 20/25

## 2023-08-07 NOTE — ASSESSMENT
Refill Decision Note   Emily Ordaz  is requesting a refill authorization.  Brief Assessment and Rationale for Refill:  Approve     Medication Therapy Plan:         Comments:     Note composed:11:38 AM 08/07/2023             [FreeTextEntry1] : Overweight/Fatigue/DM - Labs for Fatigue and Dysmetabolism.\par Low Vit. D - Vit. D Level.\par HLD - Lipid Panel.\par HTN - Controlled with Meds.\par HCV Screening - HCV Abs w Reflex.\par HIV Screening - HIV Screen.\par GERD - Controlled with Meds.\par Low Iron - Anemia Panel.\par \par F/U 1 Wk to Review Lab Testing.\par \par \par

## 2023-11-10 ENCOUNTER — OFFICE (OUTPATIENT)
Dept: URBAN - METROPOLITAN AREA CLINIC 94 | Facility: CLINIC | Age: 63
Setting detail: OPHTHALMOLOGY
End: 2023-11-10
Payer: MEDICARE

## 2023-11-10 DIAGNOSIS — H04.123: ICD-10-CM

## 2023-11-10 DIAGNOSIS — H40.1133: ICD-10-CM

## 2023-11-10 DIAGNOSIS — Z96.1: ICD-10-CM

## 2023-11-10 PROCEDURE — 92133 CPTRZD OPH DX IMG PST SGM ON: CPT | Performed by: OPHTHALMOLOGY

## 2023-11-10 PROCEDURE — 92014 COMPRE OPH EXAM EST PT 1/>: CPT | Performed by: OPHTHALMOLOGY

## 2023-11-10 ASSESSMENT — REFRACTION_MANIFEST
OS_AXIS: 092
OD_AXIS: 080
OD_SPHERE: +0.50
OD_CYLINDER: -1.50
OD_AXIS: 070
OS_VA1: 20/20-1
OD_VA2: 20/20(J1+)
OS_VA1: 20/20
OS_CYLINDER: -0.25
OS_AXIS: 095
OS_CYLINDER: -0.25
OS_SPHERE: -0.25
OS_ADD: +2.25
OD_CYLINDER: -1.25
OD_VA1: 20/20
OS_SPHERE: PLANO
OD_SPHERE: +0.50
OD_VA1: 20/25
OD_SPHERE: PLANO
OS_AXIS: 180
OS_SPHERE: +0.25
OD_VA1: 20/25
OD_SPHERE: +0.25
OS_SPHERE: PLANO
OS_VA1: 20/40
OD_ADD: +2.25
OD_AXIS: 089
OS_CYLINDER: -0.50
OS_AXIS: 092
OD_CYLINDER: -1.75
OS_VA2: 20/20(J1+)
OS_VA1: 20/25
OD_VA1: 20/30
OS_CYLINDER: -1.25
OS_ADD: +2.50
OD_ADD: +2.50
OD_AXIS: 080
OD_CYLINDER: -0.75

## 2023-11-10 ASSESSMENT — CONFRONTATIONAL VISUAL FIELD TEST (CVF)
OS_FINDINGS: FULL
OD_FINDINGS: FULL

## 2023-11-10 ASSESSMENT — REFRACTION_AUTOREFRACTION
OS_AXIS: 098
OS_CYLINDER: -0.75
OD_CYLINDER: -2.75
OD_AXIS: 078
OD_SPHERE: +1.25
OS_SPHERE: +0.75

## 2023-11-10 ASSESSMENT — REFRACTION_CURRENTRX
OS_AXIS: 092
OS_ADD: +2.25
OD_SPHERE: +2.25
OD_CYLINDER: -0.75
OS_VPRISM_DIRECTION: SV
OD_AXIS: 078
OS_VPRISM_DIRECTION: PROGS
OD_AXIS: 092
OS_SPHERE: +2.25
OS_CYLINDER: -2.50
OS_CYLINDER: -0.25
OD_SPHERE: PLANO
OS_SPHERE: PLANO
OD_CYLINDER: -2.50
OD_ADD: +2.25
OD_VPRISM_DIRECTION: SV
OS_OVR_VA: 20/
OD_OVR_VA: 20/
OS_AXIS: 089
OD_OVR_VA: 20/
OS_OVR_VA: 20/
OD_VPRISM_DIRECTION: PROGS

## 2023-11-10 ASSESSMENT — SPHEQUIV_DERIVED
OS_SPHEQUIV: 0.125
OD_SPHEQUIV: -0.125
OS_SPHEQUIV: -0.875
OD_SPHEQUIV: -0.375
OS_SPHEQUIV: 0.375
OD_SPHEQUIV: -0.25
OD_SPHEQUIV: -0.375

## 2023-11-19 ENCOUNTER — OFFICE (OUTPATIENT)
Dept: URBAN - METROPOLITAN AREA CLINIC 94 | Facility: CLINIC | Age: 63
Setting detail: OPHTHALMOLOGY
End: 2023-11-19
Payer: MEDICARE

## 2023-11-19 DIAGNOSIS — H04.121: ICD-10-CM

## 2023-11-19 DIAGNOSIS — H40.1133: ICD-10-CM

## 2023-11-19 DIAGNOSIS — H04.123: ICD-10-CM

## 2023-11-19 DIAGNOSIS — H04.122: ICD-10-CM

## 2023-11-19 PROCEDURE — 99213 OFFICE O/P EST LOW 20 MIN: CPT | Performed by: PHYSICIAN ASSISTANT

## 2023-11-19 ASSESSMENT — SPHEQUIV_DERIVED
OD_SPHEQUIV: 0
OD_SPHEQUIV: -0.375
OS_SPHEQUIV: -0.875
OS_SPHEQUIV: 0.125
OS_SPHEQUIV: 0.25
OD_SPHEQUIV: -0.375
OD_SPHEQUIV: -0.25

## 2023-11-19 ASSESSMENT — REFRACTION_MANIFEST
OS_VA2: 20/20(J1+)
OD_SPHERE: +0.50
OD_AXIS: 080
OS_SPHERE: -0.25
OS_SPHERE: +0.25
OD_VA1: 20/30
OS_AXIS: 180
OS_ADD: +2.25
OD_CYLINDER: -0.75
OD_CYLINDER: -1.50
OS_VA1: 20/20
OD_AXIS: 089
OD_ADD: +2.25
OS_AXIS: 092
OS_AXIS: 095
OD_ADD: +2.50
OS_ADD: +2.50
OS_CYLINDER: -0.50
OS_CYLINDER: -0.25
OS_CYLINDER: -0.25
OD_VA1: 20/25
OD_AXIS: 070
OD_CYLINDER: -1.75
OS_VA1: 20/25
OD_VA1: 20/20
OS_SPHERE: PLANO
OS_AXIS: 092
OD_VA1: 20/25
OS_VA1: 20/20-1
OD_SPHERE: +0.50
OD_SPHERE: +0.25
OS_SPHERE: PLANO
OD_AXIS: 080
OS_CYLINDER: -1.25
OS_VA1: 20/40
OD_CYLINDER: -1.25
OD_SPHERE: PLANO
OD_VA2: 20/20(J1+)

## 2023-11-19 ASSESSMENT — REFRACTION_CURRENTRX
OS_AXIS: 092
OD_OVR_VA: 20/
OS_VPRISM_DIRECTION: PROGS
OD_SPHERE: +2.25
OS_SPHERE: PLANO
OS_OVR_VA: 20/
OD_VPRISM_DIRECTION: PROGS
OD_OVR_VA: 20/
OS_SPHERE: +2.25
OS_OVR_VA: 20/
OS_AXIS: 089
OS_ADD: +2.25
OD_AXIS: 092
OD_AXIS: 078
OD_CYLINDER: -0.75
OD_VPRISM_DIRECTION: SV
OS_CYLINDER: -0.25
OS_CYLINDER: -2.50
OD_CYLINDER: -2.50
OS_VPRISM_DIRECTION: SV
OD_ADD: +2.25
OD_SPHERE: PLANO

## 2023-11-19 ASSESSMENT — REFRACTION_AUTOREFRACTION
OD_CYLINDER: -3.00
OD_AXIS: 077
OS_CYLINDER: -1.00
OD_SPHERE: +1.50
OS_AXIS: 091
OS_SPHERE: +0.75

## 2023-12-15 ENCOUNTER — OFFICE (OUTPATIENT)
Dept: URBAN - METROPOLITAN AREA CLINIC 94 | Facility: CLINIC | Age: 63
Setting detail: OPHTHALMOLOGY
End: 2023-12-15
Payer: MEDICARE

## 2023-12-15 ENCOUNTER — RX ONLY (RX ONLY)
Age: 63
End: 2023-12-15

## 2023-12-15 DIAGNOSIS — H40.1133: ICD-10-CM

## 2023-12-15 DIAGNOSIS — H04.123: ICD-10-CM

## 2023-12-15 PROCEDURE — 92012 INTRM OPH EXAM EST PATIENT: CPT | Performed by: OPHTHALMOLOGY

## 2023-12-15 ASSESSMENT — REFRACTION_MANIFEST
OD_AXIS: 089
OS_VA2: 20/20(J1+)
OD_SPHERE: +0.50
OD_VA2: 20/20(J1+)
OD_SPHERE: PLANO
OD_VA1: 20/20
OD_AXIS: 070
OS_VA1: 20/40
OD_SPHERE: +0.50
OD_VA1: 20/30
OD_SPHERE: +0.25
OS_CYLINDER: -0.50
OS_VA1: 20/25
OD_VA1: 20/25
OS_ADD: +2.50
OD_ADD: +2.50
OS_SPHERE: PLANO
OD_CYLINDER: -0.75
OS_VA1: 20/20
OD_CYLINDER: -1.50
OS_CYLINDER: -0.25
OS_CYLINDER: -1.25
OD_CYLINDER: -1.25
OS_ADD: +2.25
OS_SPHERE: PLANO
OD_VA1: 20/25
OS_VA1: 20/20-1
OS_CYLINDER: -0.25
OS_AXIS: 095
OS_SPHERE: -0.25
OS_SPHERE: +0.25
OS_AXIS: 092
OD_AXIS: 080
OS_AXIS: 180
OD_CYLINDER: -1.75
OD_AXIS: 080
OD_ADD: +2.25
OS_AXIS: 092

## 2023-12-15 ASSESSMENT — REFRACTION_CURRENTRX
OS_CYLINDER: -0.25
OD_ADD: +2.25
OD_OVR_VA: 20/
OS_VPRISM_DIRECTION: SV
OD_AXIS: 092
OD_CYLINDER: -0.75
OD_SPHERE: PLANO
OS_OVR_VA: 20/
OD_VPRISM_DIRECTION: PROGS
OS_SPHERE: PLANO
OS_AXIS: 092
OS_AXIS: 089
OD_VPRISM_DIRECTION: SV
OD_AXIS: 078
OS_SPHERE: +2.25
OD_SPHERE: +2.25
OS_CYLINDER: -2.50
OD_CYLINDER: -2.50
OS_ADD: +2.25
OS_VPRISM_DIRECTION: PROGS
OS_OVR_VA: 20/
OD_OVR_VA: 20/

## 2023-12-15 ASSESSMENT — SPHEQUIV_DERIVED
OD_SPHEQUIV: 0
OS_SPHEQUIV: 0.25
OD_SPHEQUIV: -0.375
OS_SPHEQUIV: -0.875
OS_SPHEQUIV: 0.125
OD_SPHEQUIV: -0.25
OD_SPHEQUIV: -0.375

## 2023-12-15 ASSESSMENT — REFRACTION_AUTOREFRACTION
OS_SPHERE: +0.75
OS_CYLINDER: -1.00
OS_AXIS: 091
OD_SPHERE: +1.50
OD_AXIS: 077
OD_CYLINDER: -3.00

## 2023-12-20 ENCOUNTER — OFFICE (OUTPATIENT)
Dept: URBAN - METROPOLITAN AREA CLINIC 94 | Facility: CLINIC | Age: 63
Setting detail: OPHTHALMOLOGY
End: 2023-12-20
Payer: MEDICARE

## 2023-12-20 DIAGNOSIS — H04.121: ICD-10-CM

## 2023-12-20 DIAGNOSIS — H04.123: ICD-10-CM

## 2023-12-20 DIAGNOSIS — H04.122: ICD-10-CM

## 2023-12-20 DIAGNOSIS — H40.1133: ICD-10-CM

## 2023-12-20 PROCEDURE — 92012 INTRM OPH EXAM EST PATIENT: CPT | Performed by: PHYSICIAN ASSISTANT

## 2023-12-20 ASSESSMENT — REFRACTION_MANIFEST
OS_VA1: 20/25
OD_CYLINDER: -0.75
OS_ADD: +2.25
OD_ADD: +2.25
OD_AXIS: 089
OD_VA1: 20/25
OS_CYLINDER: -0.25
OD_AXIS: 080
OS_VA1: 20/40
OD_VA2: 20/20(J1+)
OS_SPHERE: +0.25
OS_SPHERE: -0.25
OS_SPHERE: PLANO
OS_AXIS: 092
OD_SPHERE: PLANO
OS_CYLINDER: -0.50
OS_CYLINDER: -0.25
OD_AXIS: 080
OD_VA1: 20/30
OD_ADD: +2.50
OD_CYLINDER: -1.50
OS_AXIS: 092
OS_VA1: 20/20-1
OS_VA1: 20/20
OD_CYLINDER: -1.75
OD_VA1: 20/25
OD_VA1: 20/20
OS_CYLINDER: -1.25
OS_AXIS: 095
OS_AXIS: 180
OD_CYLINDER: -1.25
OD_AXIS: 070
OD_SPHERE: +0.50
OS_ADD: +2.50
OD_SPHERE: +0.50
OS_VA2: 20/20(J1+)
OD_SPHERE: +0.25
OS_SPHERE: PLANO

## 2023-12-20 ASSESSMENT — REFRACTION_CURRENTRX
OS_ADD: +2.25
OS_SPHERE: PLANO
OS_OVR_VA: 20/
OS_AXIS: 092
OS_VPRISM_DIRECTION: SV
OD_SPHERE: +2.25
OD_ADD: +2.25
OS_AXIS: 089
OD_AXIS: 078
OS_CYLINDER: -2.50
OS_OVR_VA: 20/
OD_SPHERE: PLANO
OD_CYLINDER: -2.50
OD_VPRISM_DIRECTION: PROGS
OD_OVR_VA: 20/
OD_CYLINDER: -0.75
OD_AXIS: 092
OD_OVR_VA: 20/
OS_VPRISM_DIRECTION: PROGS
OD_VPRISM_DIRECTION: SV
OS_CYLINDER: -0.25
OS_SPHERE: +2.25

## 2023-12-20 ASSESSMENT — REFRACTION_AUTOREFRACTION
OD_CYLINDER: -3.00
OD_SPHERE: +1.50
OS_AXIS: 087
OS_CYLINDER: -1.00
OS_SPHERE: +0.75
OD_AXIS: 081

## 2023-12-20 ASSESSMENT — SPHEQUIV_DERIVED
OS_SPHEQUIV: 0.125
OS_SPHEQUIV: -0.875
OD_SPHEQUIV: -0.375
OS_SPHEQUIV: 0.25
OD_SPHEQUIV: -0.25
OD_SPHEQUIV: -0.375
OD_SPHEQUIV: 0

## 2023-12-20 ASSESSMENT — CONFRONTATIONAL VISUAL FIELD TEST (CVF)
OD_FINDINGS: FULL
OS_FINDINGS: FULL

## 2023-12-28 ENCOUNTER — OFFICE (OUTPATIENT)
Dept: URBAN - METROPOLITAN AREA CLINIC 94 | Facility: CLINIC | Age: 63
Setting detail: OPHTHALMOLOGY
End: 2023-12-28
Payer: MEDICARE

## 2023-12-28 DIAGNOSIS — H04.123: ICD-10-CM

## 2023-12-28 DIAGNOSIS — H04.122: ICD-10-CM

## 2023-12-28 DIAGNOSIS — H40.1133: ICD-10-CM

## 2023-12-28 DIAGNOSIS — Z96.1: ICD-10-CM

## 2023-12-28 DIAGNOSIS — H04.121: ICD-10-CM

## 2023-12-28 PROCEDURE — 92012 INTRM OPH EXAM EST PATIENT: CPT | Performed by: OPHTHALMOLOGY

## 2023-12-28 ASSESSMENT — REFRACTION_CURRENTRX
OD_VPRISM_DIRECTION: PROGS
OS_ADD: +2.25
OS_AXIS: 089
OD_AXIS: 078
OS_CYLINDER: -0.25
OD_OVR_VA: 20/
OS_VPRISM_DIRECTION: PROGS
OS_OVR_VA: 20/
OD_OVR_VA: 20/
OD_VPRISM_DIRECTION: SV
OD_SPHERE: +2.25
OS_SPHERE: PLANO
OD_ADD: +2.25
OS_SPHERE: +2.25
OD_CYLINDER: -0.75
OS_OVR_VA: 20/
OD_AXIS: 092
OS_CYLINDER: -2.50
OS_VPRISM_DIRECTION: SV
OS_AXIS: 092
OD_SPHERE: PLANO
OD_CYLINDER: -2.50

## 2023-12-28 ASSESSMENT — REFRACTION_MANIFEST
OD_VA1: 20/25
OS_VA2: 20/20(J1+)
OD_SPHERE: +0.50
OD_CYLINDER: -1.50
OD_AXIS: 089
OS_CYLINDER: -1.25
OS_VA1: 20/20
OS_SPHERE: +0.25
OD_CYLINDER: -1.75
OD_CYLINDER: -1.25
OD_ADD: +2.50
OD_VA1: 20/25
OS_SPHERE: PLANO
OD_CYLINDER: -0.75
OD_SPHERE: +0.50
OD_SPHERE: PLANO
OS_ADD: +2.25
OD_AXIS: 070
OD_VA2: 20/20(J1+)
OS_VA1: 20/25
OS_CYLINDER: -0.25
OD_AXIS: 080
OS_VA1: 20/40
OS_SPHERE: PLANO
OD_AXIS: 080
OS_AXIS: 180
OS_AXIS: 095
OD_VA1: 20/30
OD_ADD: +2.25
OS_CYLINDER: -0.50
OD_SPHERE: +0.25
OS_AXIS: 092
OS_CYLINDER: -0.25
OS_SPHERE: -0.25
OD_VA1: 20/20
OS_ADD: +2.50
OS_AXIS: 092
OS_VA1: 20/20-1

## 2023-12-28 ASSESSMENT — REFRACTION_AUTOREFRACTION
OS_CYLINDER: -1.00
OD_CYLINDER: -3.00
OD_SPHERE: +1.50
OS_SPHERE: +0.75
OS_AXIS: 087
OD_AXIS: 081

## 2023-12-28 ASSESSMENT — SPHEQUIV_DERIVED
OS_SPHEQUIV: 0.125
OD_SPHEQUIV: -0.375
OS_SPHEQUIV: -0.875
OS_SPHEQUIV: 0.25
OD_SPHEQUIV: -0.375
OD_SPHEQUIV: 0
OD_SPHEQUIV: -0.25

## 2023-12-28 ASSESSMENT — CONFRONTATIONAL VISUAL FIELD TEST (CVF)
OS_FINDINGS: FULL
OD_FINDINGS: FULL

## 2024-02-02 ENCOUNTER — OFFICE (OUTPATIENT)
Dept: URBAN - METROPOLITAN AREA CLINIC 94 | Facility: CLINIC | Age: 64
Setting detail: OPHTHALMOLOGY
End: 2024-02-02
Payer: MEDICARE

## 2024-02-02 DIAGNOSIS — H04.121: ICD-10-CM

## 2024-02-02 DIAGNOSIS — H40.1133: ICD-10-CM

## 2024-02-02 DIAGNOSIS — H04.123: ICD-10-CM

## 2024-02-02 DIAGNOSIS — Z96.1: ICD-10-CM

## 2024-02-02 DIAGNOSIS — H04.122: ICD-10-CM

## 2024-02-02 PROCEDURE — 99213 OFFICE O/P EST LOW 20 MIN: CPT | Performed by: OPHTHALMOLOGY

## 2024-02-02 ASSESSMENT — REFRACTION_CURRENTRX
OS_OVR_VA: 20/
OS_ADD: +2.25
OS_CYLINDER: -0.25
OS_AXIS: 092
OD_CYLINDER: -0.75
OS_AXIS: 089
OD_ADD: +2.25
OD_AXIS: 092
OD_SPHERE: PLANO
OD_VPRISM_DIRECTION: PROGS
OD_AXIS: 078
OD_SPHERE: +2.25
OD_OVR_VA: 20/
OS_CYLINDER: -2.50
OD_VPRISM_DIRECTION: SV
OD_CYLINDER: -2.50
OS_VPRISM_DIRECTION: SV
OS_VPRISM_DIRECTION: PROGS
OS_OVR_VA: 20/
OS_SPHERE: +2.25
OS_SPHERE: PLANO
OD_OVR_VA: 20/

## 2024-02-02 ASSESSMENT — REFRACTION_MANIFEST
OS_VA1: 20/25
OD_VA1: 20/20
OD_AXIS: 080
OD_AXIS: 080
OS_VA1: 20/40
OS_AXIS: 095
OS_VA2: 20/20(J1+)
OD_ADD: +2.50
OS_CYLINDER: -1.25
OD_CYLINDER: -1.75
OD_SPHERE: +0.25
OD_VA1: 20/30
OS_CYLINDER: -0.25
OD_CYLINDER: -1.50
OD_CYLINDER: -0.75
OS_SPHERE: PLANO
OD_AXIS: 089
OS_VA1: 20/20-1
OS_CYLINDER: -0.25
OD_CYLINDER: -1.25
OD_SPHERE: +0.50
OD_ADD: +2.25
OS_AXIS: 180
OD_VA2: 20/20(J1+)
OS_AXIS: 092
OS_CYLINDER: -0.50
OS_SPHERE: +0.25
OS_AXIS: 092
OS_SPHERE: -0.25
OS_ADD: +2.25
OD_SPHERE: +0.50
OD_VA1: 20/25
OS_VA1: 20/20
OD_VA1: 20/25
OD_SPHERE: PLANO
OD_AXIS: 070
OS_SPHERE: PLANO
OS_ADD: +2.50

## 2024-02-02 ASSESSMENT — SPHEQUIV_DERIVED
OD_SPHEQUIV: -0.375
OD_SPHEQUIV: -0.375
OS_SPHEQUIV: 0.125
OS_SPHEQUIV: 0.25
OD_SPHEQUIV: -0.125
OD_SPHEQUIV: -0.25
OS_SPHEQUIV: -0.875

## 2024-02-02 ASSESSMENT — REFRACTION_AUTOREFRACTION
OD_AXIS: 087
OD_CYLINDER: -2.75
OD_SPHERE: +1.25
OS_AXIS: 089
OS_CYLINDER: -1.00
OS_SPHERE: +0.75

## 2024-02-02 ASSESSMENT — CONFRONTATIONAL VISUAL FIELD TEST (CVF)
OD_FINDINGS: FULL
OS_FINDINGS: FULL

## 2024-02-16 ENCOUNTER — ASC (OUTPATIENT)
Dept: URBAN - METROPOLITAN AREA SURGERY 8 | Facility: SURGERY | Age: 64
Setting detail: OPHTHALMOLOGY
End: 2024-02-16
Payer: MEDICARE

## 2024-02-16 DIAGNOSIS — H40.1113: ICD-10-CM

## 2024-02-16 PROCEDURE — 65855 TRABECULOPLASTY LASER SURG: CPT | Mod: RT | Performed by: OPHTHALMOLOGY

## 2024-02-16 ASSESSMENT — REFRACTION_MANIFEST
OD_VA2: 20/20(J1+)
OD_AXIS: 070
OD_AXIS: 080
OS_SPHERE: PLANO
OS_VA1: 20/40
OS_VA2: 20/20(J1+)
OS_SPHERE: +0.25
OD_CYLINDER: -1.25
OS_AXIS: 180
OS_CYLINDER: -0.50
OS_VA1: 20/20-1
OD_VA1: 20/30
OD_VA1: 20/25
OS_AXIS: 092
OS_CYLINDER: -0.25
OD_SPHERE: +0.50
OD_CYLINDER: -1.50
OS_AXIS: 095
OD_CYLINDER: -1.75
OD_AXIS: 080
OD_VA1: 20/20
OS_VA1: 20/20
OD_AXIS: 089
OS_AXIS: 092
OD_SPHERE: PLANO
OD_CYLINDER: -0.75
OS_CYLINDER: -1.25
OD_VA1: 20/25
OD_ADD: +2.50
OD_SPHERE: +0.50
OS_ADD: +2.25
OS_VA1: 20/25
OS_CYLINDER: -0.25
OS_SPHERE: -0.25
OS_SPHERE: PLANO
OD_ADD: +2.25
OS_ADD: +2.50
OD_SPHERE: +0.25

## 2024-02-16 ASSESSMENT — REFRACTION_CURRENTRX
OS_SPHERE: PLANO
OS_CYLINDER: -0.25
OS_SPHERE: +2.25
OS_ADD: +2.25
OD_AXIS: 078
OD_CYLINDER: -2.50
OD_CYLINDER: -0.75
OD_OVR_VA: 20/
OS_VPRISM_DIRECTION: SV
OD_VPRISM_DIRECTION: SV
OS_OVR_VA: 20/
OD_SPHERE: PLANO
OS_VPRISM_DIRECTION: PROGS
OD_OVR_VA: 20/
OD_AXIS: 092
OD_SPHERE: +2.25
OS_AXIS: 089
OS_AXIS: 092
OS_CYLINDER: -2.50
OD_ADD: +2.25
OD_VPRISM_DIRECTION: PROGS
OS_OVR_VA: 20/

## 2024-02-16 ASSESSMENT — REFRACTION_AUTOREFRACTION
OD_SPHERE: +1.25
OD_CYLINDER: -2.75
OS_SPHERE: +0.75
OS_CYLINDER: -1.00
OS_AXIS: 089
OD_AXIS: 087

## 2024-02-16 ASSESSMENT — SPHEQUIV_DERIVED
OD_SPHEQUIV: -0.25
OS_SPHEQUIV: 0.125
OS_SPHEQUIV: 0.25
OD_SPHEQUIV: -0.375
OS_SPHEQUIV: -0.875
OD_SPHEQUIV: -0.125
OD_SPHEQUIV: -0.375

## 2024-03-15 ENCOUNTER — RX ONLY (RX ONLY)
Age: 64
End: 2024-03-15

## 2024-03-15 ENCOUNTER — OFFICE (OUTPATIENT)
Dept: URBAN - METROPOLITAN AREA CLINIC 94 | Facility: CLINIC | Age: 64
Setting detail: OPHTHALMOLOGY
End: 2024-03-15
Payer: MEDICARE

## 2024-03-15 DIAGNOSIS — H04.123: ICD-10-CM

## 2024-03-15 DIAGNOSIS — H40.1113: ICD-10-CM

## 2024-03-15 DIAGNOSIS — Z96.1: ICD-10-CM

## 2024-03-15 DIAGNOSIS — H04.121: ICD-10-CM

## 2024-03-15 DIAGNOSIS — H04.122: ICD-10-CM

## 2024-03-15 PROCEDURE — 92012 INTRM OPH EXAM EST PATIENT: CPT | Performed by: OPHTHALMOLOGY

## 2024-03-15 ASSESSMENT — REFRACTION_CURRENTRX
OS_AXIS: 089
OD_VPRISM_DIRECTION: PROGS
OD_CYLINDER: -2.50
OS_AXIS: 092
OD_AXIS: 092
OS_VPRISM_DIRECTION: PROGS
OD_OVR_VA: 20/
OD_SPHERE: PLANO
OS_OVR_VA: 20/
OD_AXIS: 078
OD_ADD: +2.25
OS_ADD: +2.25
OD_CYLINDER: -0.75
OS_CYLINDER: -0.25
OD_SPHERE: +2.25
OD_OVR_VA: 20/
OD_VPRISM_DIRECTION: SV
OS_SPHERE: PLANO
OS_OVR_VA: 20/
OS_VPRISM_DIRECTION: SV
OS_SPHERE: +2.25
OS_CYLINDER: -2.50

## 2024-03-15 ASSESSMENT — REFRACTION_MANIFEST
OS_VA1: 20/40
OS_SPHERE: PLANO
OD_SPHERE: +0.50
OD_SPHERE: PLANO
OS_CYLINDER: -0.25
OD_VA1: 20/25
OS_VA1: 20/20-1
OS_VA1: 20/20
OD_AXIS: 089
OS_ADD: +2.25
OS_SPHERE: +0.25
OD_AXIS: 080
OS_VA1: 20/25
OD_ADD: +2.25
OS_SPHERE: PLANO
OS_ADD: +2.50
OS_CYLINDER: -1.25
OS_CYLINDER: -0.50
OS_SPHERE: -0.25
OD_VA1: 20/30
OD_CYLINDER: -0.75
OD_SPHERE: +0.50
OS_VA2: 20/20(J1+)
OS_CYLINDER: -0.25
OS_AXIS: 092
OD_CYLINDER: -1.25
OD_CYLINDER: -1.50
OS_AXIS: 180
OD_AXIS: 070
OD_SPHERE: +0.25
OD_ADD: +2.50
OD_VA1: 20/20
OD_AXIS: 080
OS_AXIS: 095
OD_VA1: 20/25
OS_AXIS: 092
OD_VA2: 20/20(J1+)
OD_CYLINDER: -1.75

## 2024-03-15 ASSESSMENT — SPHEQUIV_DERIVED
OD_SPHEQUIV: -0.375
OS_SPHEQUIV: -0.875
OS_SPHEQUIV: 0.125
OD_SPHEQUIV: -0.375
OD_SPHEQUIV: -0.25

## 2024-03-21 ENCOUNTER — LABORATORY RESULT (OUTPATIENT)
Age: 64
End: 2024-03-21

## 2024-03-21 ENCOUNTER — APPOINTMENT (OUTPATIENT)
Dept: FAMILY MEDICINE | Facility: CLINIC | Age: 64
End: 2024-03-21
Payer: MEDICARE

## 2024-03-21 VITALS
SYSTOLIC BLOOD PRESSURE: 110 MMHG | WEIGHT: 133 LBS | DIASTOLIC BLOOD PRESSURE: 80 MMHG | RESPIRATION RATE: 14 BRPM | BODY MASS INDEX: 22.16 KG/M2 | HEIGHT: 65 IN

## 2024-03-21 DIAGNOSIS — Z00.00 ENCOUNTER FOR GENERAL ADULT MEDICAL EXAMINATION W/OUT ABNORMAL FINDINGS: ICD-10-CM

## 2024-03-21 DIAGNOSIS — H91.92 UNSPECIFIED HEARING LOSS, LEFT EAR: ICD-10-CM

## 2024-03-21 PROCEDURE — 99396 PREV VISIT EST AGE 40-64: CPT

## 2024-03-21 PROCEDURE — 36415 COLL VENOUS BLD VENIPUNCTURE: CPT

## 2024-03-21 RX ORDER — VALACYCLOVIR 1 G/1
1 TABLET, FILM COATED ORAL
Qty: 30 | Refills: 2 | Status: ACTIVE | COMMUNITY
Start: 2024-03-21 | End: 1900-01-01

## 2024-03-21 RX ORDER — ERGOCALCIFEROL 1.25 MG/1
1.25 MG CAPSULE, LIQUID FILLED ORAL
Qty: 12 | Refills: 2 | Status: ACTIVE | COMMUNITY
Start: 2017-02-11 | End: 1900-01-01

## 2024-03-21 RX ORDER — LOSARTAN POTASSIUM 100 MG/1
100 TABLET, FILM COATED ORAL
Qty: 90 | Refills: 2 | Status: ACTIVE | COMMUNITY
Start: 2024-03-21 | End: 1900-01-01

## 2024-03-21 RX ORDER — AMLODIPINE BESYLATE 10 MG/1
10 TABLET ORAL DAILY
Qty: 90 | Refills: 1 | Status: COMPLETED | COMMUNITY
Start: 2022-11-08 | End: 2024-03-21

## 2024-03-21 NOTE — HEALTH RISK ASSESSMENT
[Good] : ~his/her~  mood as  good [No falls in past year] : Patient reported no falls in the past year [0] : 2) Feeling down, depressed, or hopeless: Not at all (0) [Patient declined Low Dose CT Scan] : Patient declined Low Dose CT Scan [No Retinopathy] : No retinopathy [Patient reported mammogram was normal] : Patient reported mammogram was normal [Patient reported PAP Smear was normal] : Patient reported PAP Smear was normal [Patient reported bone density results were normal] : Patient reported bone density results were normal [HIV Test offered] : HIV Test offered [Patient reported colonoscopy was normal] : Patient reported colonoscopy was normal [Hepatitis C test offered] : Hepatitis C test offered [None] : None [With Significant Other] : lives with significant other [# of Members in Household ___] :  household currently consist of [unfilled] member(s) [Employed] : employed [College] : College [] :  [# Of Children ___] : has [unfilled] children [Sexually Active] : sexually active [Fully functional (bathing, dressing, toileting, transferring, walking, feeding)] : Fully functional (bathing, dressing, toileting, transferring, walking, feeding) [Feels Safe at Home] : Feels safe at home [Fully functional (using the telephone, shopping, preparing meals, housekeeping, doing laundry, using] : Fully functional and needs no help or supervision to perform IADLs (using the telephone, shopping, preparing meals, housekeeping, doing laundry, using transportation, managing medications and managing finances) [Smoke Detector] : smoke detector [Carbon Monoxide Detector] : carbon monoxide detector [Safety elements used in home] : safety elements used in home [Seat Belt] :  uses seat belt [Sunscreen] : uses sunscreen [I will adhere to the patient's wishes.] : I will adhere to the patient's wishes. [Time Spent: ___ minutes] : Time Spent: [unfilled] minutes [Current] : Current [LowDoseCTScan] : 03/24 [EyeExamDate] : 03/24 [Change in mental status noted] : No change in mental status noted [Behavior] : denies difficulty with behavior [Language] : denies difficulty with language [Handling Complex Tasks] : denies difficulty handling complex tasks [Learning/Retaining New Information] : denies difficulty learning/retaining new information [Reasoning] : denies difficulty with reasoning [Spatial Ability and Orientation] : denies difficulty with spatial ability and orientation [High Risk Behavior] : no high risk behavior [Reports changes in hearing] : Reports no changes in hearing [Reports changes in vision] : Reports no changes in vision [Reports changes in dental health] : Reports no changes in dental health [Guns at Home] : no guns at home [Travel to Developing Areas] : does not  travel to developing areas [TB Exposure] : is not being exposed to tuberculosis [Caregiver Concerns] : does not have caregiver concerns [MammogramDate] : 09/17 [PapSmearDate] : 09/17 [BoneDensityDate] : 09/17 [ColonoscopyDate] : 10/15 [HIVDate] : 06/17 [HepatitisCDate] : 06/17 [FreeTextEntry2] : LENNY [AdvancecareDate] : 03/24

## 2024-03-21 NOTE — PHYSICAL EXAM
[Well Nourished] : well nourished [Well Developed] : well developed [Well-Appearing] : well-appearing [Normal Sclera/Conjunctiva] : normal sclera/conjunctiva [PERRL] : pupils equal round and reactive to light [EOMI] : extraocular movements intact [Normal Oropharynx] : the oropharynx was normal [Normal Outer Ear/Nose] : the outer ears and nose were normal in appearance [No Respiratory Distress] : no respiratory distress  [No Accessory Muscle Use] : no accessory muscle use [Clear to Auscultation] : lungs were clear to auscultation bilaterally [Normal Rate] : normal rate  [Regular Rhythm] : with a regular rhythm [Normal S1, S2] : normal S1 and S2 [No Murmur] : no murmur heard [No Carotid Bruits] : no carotid bruits [No Varicosities] : no varicosities [No Abdominal Bruit] : a ~M bruit was not heard ~T in the abdomen [Pedal Pulses Present] : the pedal pulses are present [No Edema] : there was no peripheral edema [No Palpable Aorta] : no palpable aorta [No Extremity Clubbing/Cyanosis] : no extremity clubbing/cyanosis [Soft] : abdomen soft [Non Tender] : non-tender [Non-distended] : non-distended [No Masses] : no abdominal mass palpated [No HSM] : no HSM [Normal Bowel Sounds] : normal bowel sounds [Normal Posterior Cervical Nodes] : no posterior cervical lymphadenopathy [Normal Anterior Cervical Nodes] : no anterior cervical lymphadenopathy [No CVA Tenderness] : no CVA  tenderness [No Spinal Tenderness] : no spinal tenderness [No Joint Swelling] : no joint swelling [___/1] : [unfilled]/1   [___/3] : [unfilled]/3 [___/5] : [unfilled]/5 [___/4] : [unfilled]/4 [___/2] : [unfilled]/2 [___/8] : [unfilled]/8 [Normal] : Normal [Normal Affect] : the affect was normal [Normal Insight/Judgement] : insight and judgment were intact [Comprehensive Foot Exam Normal] : Right and left foot were examined and both feet are normal. No ulcers in either foot. Toes are normal and with full ROM.  Normal tactile sensation with monofilament testing throughout both feet [de-identified] : Decreased ROM, Pain Associated with Mvt.  + Compression/Distraction/Spurlings Orthopaedic Tests [de-identified] : Decreased ROM, Pain Associated with Mvt. + Leg raise Tests [de-identified] : Decreased DTR's to Upper and Lower Extremities [de-identified] : Decreased Strength to Upper and Lower Extremities. [TextBox_2] : Yes [TextBox_4] : HS [SlumsTotal] : 30

## 2024-03-21 NOTE — COUNSELING
[Fall prevention counseling provided] : Fall prevention counseling provided [Adequate lighting] : Adequate lighting [No throw rugs] : No throw rugs [Use proper foot wear] : Use proper foot wear [Yes] : Risk of tobacco use and health benefits of smoking cessation discussed: Yes [AUDIT-C Screening administered and reviewed] : AUDIT-C Screening administered and reviewed [Potential consequences of obesity discussed] : Potential consequences of obesity discussed [Benefits of weight loss discussed] : Benefits of weight loss discussed [Encouraged to increase physical activity] : Encouraged to increase physical activity [Weigh Self Weekly] : weigh self weekly [Decrease Portions] : decrease portions [None] : None [Good understanding] : Patient has a good understanding of lifestyle changes and steps needed to achieve self management goal

## 2024-03-21 NOTE — REVIEW OF SYSTEMS
[Patient Intake Form Reviewed] : Patient intake form was reviewed [Negative] : Psychiatric [FreeTextEntry5] : HTN, HLD [FreeTextEntry1] : Low Vit. D, DM [de-identified] : Bipolar Disorder

## 2024-03-21 NOTE — ASSESSMENT
[FreeTextEntry1] : Overweight/Fatigue/DM - Labs for Fatigue and Dysmetabolism.\par  Low Vit. D - Vit. D Level.\par  HLD - Lipid Panel.\par  HTN - Controlled with Meds.\par  HCV Screening - HCV Abs w Reflex.\par  HIV Screening - HIV Screen.\par  GERD - Controlled with Meds.\par  Low Iron - Anemia Panel.\par  \par  F/U 1 Wk to Review Lab Testing.\par  \par  \par

## 2024-03-22 LAB
25(OH)D3 SERPL-MCNC: 33.6 NG/ML
ALBUMIN SERPL ELPH-MCNC: 4.2 G/DL
ALP BLD-CCNC: 63 U/L
ALT SERPL-CCNC: 6 U/L
ANION GAP SERPL CALC-SCNC: 9 MMOL/L
APPEARANCE: CLEAR
AST SERPL-CCNC: 11 U/L
BASOPHILS # BLD AUTO: 0.01 K/UL
BASOPHILS NFR BLD AUTO: 0.4 %
BILIRUB SERPL-MCNC: 0.3 MG/DL
BILIRUBIN URINE: NEGATIVE
BLOOD URINE: NEGATIVE
BUN SERPL-MCNC: 27 MG/DL
C PEPTIDE SERPL-MCNC: 2.2 NG/ML
CALCIUM SERPL-MCNC: 8.8 MG/DL
CHLORIDE SERPL-SCNC: 111 MMOL/L
CHOLEST SERPL-MCNC: 156 MG/DL
CO2 SERPL-SCNC: 19 MMOL/L
COLOR: YELLOW
CREAT SERPL-MCNC: 1.79 MG/DL
CREAT SPEC-SCNC: 69 MG/DL
EGFR: 32 ML/MIN/1.73M2
EOSINOPHIL # BLD AUTO: 0.04 K/UL
EOSINOPHIL NFR BLD AUTO: 1.5 %
ESTIMATED AVERAGE GLUCOSE: 97 MG/DL
FERRITIN SERPL-MCNC: 155 NG/ML
FOLATE SERPL-MCNC: 8.8 NG/ML
GLUCOSE QUALITATIVE U: NEGATIVE MG/DL
GLUCOSE SERPL-MCNC: 81 MG/DL
HBA1C MFR BLD HPLC: 5 %
HCT VFR BLD CALC: 39 %
HDLC SERPL-MCNC: 62 MG/DL
HGB BLD-MCNC: 12.8 G/DL
IMM GRANULOCYTES NFR BLD AUTO: 0 %
IRON SATN MFR SERPL: 33 %
IRON SERPL-MCNC: 93 UG/DL
KETONES URINE: NEGATIVE MG/DL
LDLC SERPL CALC-MCNC: 80 MG/DL
LEUKOCYTE ESTERASE URINE: ABNORMAL
LYMPHOCYTES # BLD AUTO: 0.82 K/UL
LYMPHOCYTES NFR BLD AUTO: 30.9 %
MAN DIFF?: NORMAL
MCHC RBC-ENTMCNC: 30.4 PG
MCHC RBC-ENTMCNC: 32.8 GM/DL
MCV RBC AUTO: 92.6 FL
MICROALBUMIN 24H UR DL<=1MG/L-MCNC: 3.5 MG/DL
MICROALBUMIN/CREAT 24H UR-RTO: 51 MG/G
MONOCYTES # BLD AUTO: 0.28 K/UL
MONOCYTES NFR BLD AUTO: 10.6 %
NEUTROPHILS # BLD AUTO: 1.5 K/UL
NEUTROPHILS NFR BLD AUTO: 56.6 %
NITRITE URINE: NEGATIVE
NONHDLC SERPL-MCNC: 94 MG/DL
PH URINE: 6
PLATELET # BLD AUTO: 153 K/UL
POTASSIUM SERPL-SCNC: 3.9 MMOL/L
PROT SERPL-MCNC: 7.8 G/DL
PROTEIN URINE: NEGATIVE MG/DL
RBC # BLD: 4.21 M/UL
RBC # FLD: 14 %
SODIUM SERPL-SCNC: 139 MMOL/L
SPECIFIC GRAVITY URINE: 1.01
T4 SERPL-MCNC: 8.9 UG/DL
TIBC SERPL-MCNC: 283 UG/DL
TRIGL SERPL-MCNC: 76 MG/DL
TSH SERPL-ACNC: 1.46 UIU/ML
UIBC SERPL-MCNC: 189 UG/DL
UROBILINOGEN URINE: 0.2 MG/DL
VIT B12 SERPL-MCNC: 300 PG/ML
WBC # FLD AUTO: 2.65 K/UL

## 2024-03-25 LAB — URINE CULTURE <10: ABNORMAL

## 2024-03-28 ENCOUNTER — NON-APPOINTMENT (OUTPATIENT)
Age: 64
End: 2024-03-28

## 2024-03-28 RX ORDER — SENNOSIDES 8.6 MG/1
8.6 TABLET, COATED ORAL
Qty: 60 | Refills: 0 | Status: COMPLETED | COMMUNITY
Start: 2022-06-27 | End: 2024-03-28

## 2024-03-28 RX ORDER — DOCUSATE SODIUM 100 MG/1
100 CAPSULE, LIQUID FILLED ORAL
Qty: 60 | Refills: 0 | Status: COMPLETED | COMMUNITY
Start: 2022-04-19 | End: 2024-03-28

## 2024-04-04 ENCOUNTER — APPOINTMENT (OUTPATIENT)
Dept: FAMILY MEDICINE | Facility: CLINIC | Age: 64
End: 2024-04-04

## 2024-04-04 DIAGNOSIS — D72.819 DECREASED WHITE BLOOD CELL COUNT, UNSPECIFIED: ICD-10-CM

## 2024-04-04 NOTE — ASSESSMENT
[FreeTextEntry1] : Overweight/Fatigue/DM - Labs for Fatigue and Dysmetabolism. Low Vit. D - Vit. D Level. HLD - Lipid Panel. HTN - Controlled with Meds. HCV Screening - HCV Abs w Reflex. HIV Screening - HIV Screen. GERD - Controlled with Meds. Low Iron - Anemia Panel.  Spoke with Pt. on Phone for 15 min.

## 2024-04-04 NOTE — PHYSICAL EXAM
[Well Nourished] : well nourished [Well Developed] : well developed [Well-Appearing] : well-appearing [Normal Sclera/Conjunctiva] : normal sclera/conjunctiva [PERRL] : pupils equal round and reactive to light [EOMI] : extraocular movements intact [Normal Outer Ear/Nose] : the outer ears and nose were normal in appearance [Normal Oropharynx] : the oropharynx was normal [No Respiratory Distress] : no respiratory distress  [No Accessory Muscle Use] : no accessory muscle use [Clear to Auscultation] : lungs were clear to auscultation bilaterally [Normal Rate] : normal rate  [Regular Rhythm] : with a regular rhythm [Normal S1, S2] : normal S1 and S2 [No Murmur] : no murmur heard [No Carotid Bruits] : no carotid bruits [No Abdominal Bruit] : a ~M bruit was not heard ~T in the abdomen [No Varicosities] : no varicosities [Pedal Pulses Present] : the pedal pulses are present [No Edema] : there was no peripheral edema [No Palpable Aorta] : no palpable aorta [No Extremity Clubbing/Cyanosis] : no extremity clubbing/cyanosis [Soft] : abdomen soft [Non Tender] : non-tender [Non-distended] : non-distended [No Masses] : no abdominal mass palpated [No HSM] : no HSM [Normal Bowel Sounds] : normal bowel sounds [Normal Posterior Cervical Nodes] : no posterior cervical lymphadenopathy [Normal Anterior Cervical Nodes] : no anterior cervical lymphadenopathy [No CVA Tenderness] : no CVA  tenderness [No Spinal Tenderness] : no spinal tenderness [No Joint Swelling] : no joint swelling [___/1] : [unfilled]/1   [___/3] : [unfilled]/3 [___/5] : [unfilled]/5 [___/4] : [unfilled]/4 [___/2] : [unfilled]/2 [___/8] : [unfilled]/8 [Normal] : Normal [Normal Affect] : the affect was normal [Normal Insight/Judgement] : insight and judgment were intact [Comprehensive Foot Exam Normal] : Right and left foot were examined and both feet are normal. No ulcers in either foot. Toes are normal and with full ROM.  Normal tactile sensation with monofilament testing throughout both feet [de-identified] : Decreased ROM, Pain Associated with Mvt.  + Compression/Distraction/Spurlings Orthopaedic Tests [de-identified] : Decreased ROM, Pain Associated with Mvt. + Leg raise Tests [de-identified] : Decreased Strength to Upper and Lower Extremities. [de-identified] : Decreased DTR's to Upper and Lower Extremities [TextBox_2] : Yes [TextBox_4] : HS [SlumsTotal] : 30

## 2024-04-04 NOTE — HEALTH RISK ASSESSMENT
[Yes] : Yes [Monthly or less (1 pt)] : Monthly or less (1 point) [1 or 2 (0 pts)] : 1 or 2 (0 points) [Never (0 pts)] : Never (0 points) [No] : In the past 12 months have you used drugs other than those required for medical reasons? No [No falls in past year] : Patient reported no falls in the past year [0] : 2) Feeling down, depressed, or hopeless: Not at all (0) [PHQ-2 Negative - No further assessment needed] : PHQ-2 Negative - No further assessment needed [Audit-CScore] : 1 [de-identified] : Average [de-identified] : Average [facility]] : 7 [[id number]] : 0 [I will adhere to the patient's wishes.] : I will adhere to the patient's wishes. [Time Spent: ___ minutes] : Time Spent: [unfilled] minutes [AdvancecareDate] : 03/24 [Current] : Current

## 2024-04-04 NOTE — REVIEW OF SYSTEMS
[Patient Intake Form Reviewed] : Patient intake form was reviewed [Negative] : Heme/Lymph [FreeTextEntry5] : HTN, HLD [de-identified] : Bipolar Disorder [FreeTextEntry1] : Low Vit. D, DM

## 2024-04-10 ENCOUNTER — OFFICE (OUTPATIENT)
Dept: URBAN - METROPOLITAN AREA CLINIC 94 | Facility: CLINIC | Age: 64
Setting detail: OPHTHALMOLOGY
End: 2024-04-10
Payer: MEDICARE

## 2024-04-10 DIAGNOSIS — H04.122: ICD-10-CM

## 2024-04-10 DIAGNOSIS — H40.1113: ICD-10-CM

## 2024-04-10 DIAGNOSIS — H04.121: ICD-10-CM

## 2024-04-10 DIAGNOSIS — H04.123: ICD-10-CM

## 2024-04-10 PROCEDURE — 92083 EXTENDED VISUAL FIELD XM: CPT | Performed by: OPHTHALMOLOGY

## 2024-04-10 PROCEDURE — 83861 MICROFLUID ANALY TEARS: CPT | Mod: LT | Performed by: OPHTHALMOLOGY

## 2024-04-10 PROCEDURE — 99213 OFFICE O/P EST LOW 20 MIN: CPT | Performed by: OPHTHALMOLOGY

## 2024-04-10 PROCEDURE — 83861 MICROFLUID ANALY TEARS: CPT | Mod: RT | Performed by: OPHTHALMOLOGY

## 2024-04-29 ENCOUNTER — APPOINTMENT (OUTPATIENT)
Dept: FAMILY MEDICINE | Facility: CLINIC | Age: 64
End: 2024-04-29

## 2024-04-29 NOTE — HISTORY OF PRESENT ILLNESS
[FreeTextEntry1] : F/U Apt. to Review Test Results [de-identified] : F/U Apt. to Review Test Results

## 2024-04-29 NOTE — ASSESSMENT
[FreeTextEntry1] : Overweight/Fatigue/DM - Diet and Exercise CKD - ACR = 51 and GFRR = 32 Low Vit. D - Vit. D. HLD - Lipid Panel. HTN - Controlled with Meds. HCV Screening - Negative HCV Abs w Reflex. HIV Screening - Negative HIV Screen. GERD - Controlled with Meds. Low Iron - Anemia Panel.  Spoke with Pt. on Phone for 15 min.

## 2024-04-29 NOTE — HEALTH RISK ASSESSMENT
[No falls in past year] : Patient reported no falls in the past year [0] : 2) Feeling down, depressed, or hopeless: Not at all (0) [I will adhere to the patient's wishes.] : I will adhere to the patient's wishes. [Time Spent: ___ minutes] : Time Spent: [unfilled] minutes [AdvancecareDate] : 03/24 [Current] : Current

## 2024-04-29 NOTE — REVIEW OF SYSTEMS
[Patient Intake Form Reviewed] : Patient intake form was reviewed [Negative] : Heme/Lymph [FreeTextEntry5] : HTN, HLD [de-identified] : Bipolar Disorder [FreeTextEntry1] : Low Vit. D, DM

## 2024-04-29 NOTE — PHYSICAL EXAM
[Well Nourished] : well nourished [Well Developed] : well developed [Well-Appearing] : well-appearing [Normal Sclera/Conjunctiva] : normal sclera/conjunctiva [PERRL] : pupils equal round and reactive to light [EOMI] : extraocular movements intact [Normal Outer Ear/Nose] : the outer ears and nose were normal in appearance [Normal Oropharynx] : the oropharynx was normal [No Respiratory Distress] : no respiratory distress  [No Accessory Muscle Use] : no accessory muscle use [Clear to Auscultation] : lungs were clear to auscultation bilaterally [Normal Rate] : normal rate  [Regular Rhythm] : with a regular rhythm [Normal S1, S2] : normal S1 and S2 [No Murmur] : no murmur heard [No Carotid Bruits] : no carotid bruits [No Abdominal Bruit] : a ~M bruit was not heard ~T in the abdomen [No Varicosities] : no varicosities [Pedal Pulses Present] : the pedal pulses are present [No Edema] : there was no peripheral edema [No Palpable Aorta] : no palpable aorta [No Extremity Clubbing/Cyanosis] : no extremity clubbing/cyanosis [Soft] : abdomen soft [Non Tender] : non-tender [Non-distended] : non-distended [No Masses] : no abdominal mass palpated [No HSM] : no HSM [Normal Bowel Sounds] : normal bowel sounds [Normal Posterior Cervical Nodes] : no posterior cervical lymphadenopathy [Normal Anterior Cervical Nodes] : no anterior cervical lymphadenopathy [No CVA Tenderness] : no CVA  tenderness [No Spinal Tenderness] : no spinal tenderness [No Joint Swelling] : no joint swelling [___/1] : [unfilled]/1   [___/3] : [unfilled]/3 [___/5] : [unfilled]/5 [___/4] : [unfilled]/4 [___/2] : [unfilled]/2 [___/8] : [unfilled]/8 [Normal] : Normal [Normal Affect] : the affect was normal [Normal Insight/Judgement] : insight and judgment were intact [Comprehensive Foot Exam Normal] : Right and left foot were examined and both feet are normal. No ulcers in either foot. Toes are normal and with full ROM.  Normal tactile sensation with monofilament testing throughout both feet [de-identified] : Decreased ROM, Pain Associated with Mvt.  + Compression/Distraction/Spurlings Orthopaedic Tests [de-identified] : Decreased ROM, Pain Associated with Mvt. + Leg raise Tests [de-identified] : Decreased Strength to Upper and Lower Extremities. [de-identified] : Decreased DTR's to Upper and Lower Extremities [TextBox_2] : Yes [TextBox_4] : HS [SlumsTotal] : 30

## 2024-05-03 ENCOUNTER — OFFICE (OUTPATIENT)
Dept: URBAN - METROPOLITAN AREA CLINIC 94 | Facility: CLINIC | Age: 64
Setting detail: OPHTHALMOLOGY
End: 2024-05-03
Payer: MEDICARE

## 2024-05-03 DIAGNOSIS — Z96.1: ICD-10-CM

## 2024-05-03 DIAGNOSIS — H04.123: ICD-10-CM

## 2024-05-03 DIAGNOSIS — H40.1113: ICD-10-CM

## 2024-05-03 PROCEDURE — 92012 INTRM OPH EXAM EST PATIENT: CPT | Performed by: OPHTHALMOLOGY

## 2024-05-03 PROCEDURE — 92250 FUNDUS PHOTOGRAPHY W/I&R: CPT | Performed by: OPHTHALMOLOGY

## 2024-05-03 ASSESSMENT — CONFRONTATIONAL VISUAL FIELD TEST (CVF)
OS_FINDINGS: FULL
OD_FINDINGS: FULL

## 2024-05-14 ENCOUNTER — APPOINTMENT (OUTPATIENT)
Dept: FAMILY MEDICINE | Facility: CLINIC | Age: 64
End: 2024-05-14
Payer: MEDICARE

## 2024-05-14 ENCOUNTER — APPOINTMENT (OUTPATIENT)
Dept: FAMILY MEDICINE | Facility: CLINIC | Age: 64
End: 2024-05-14

## 2024-05-14 DIAGNOSIS — R79.89 OTHER SPECIFIED ABNORMAL FINDINGS OF BLOOD CHEMISTRY: ICD-10-CM

## 2024-05-14 DIAGNOSIS — N18.30 CHRONIC KIDNEY DISEASE, STAGE 3 UNSPECIFIED: ICD-10-CM

## 2024-05-14 DIAGNOSIS — E55.9 VITAMIN D DEFICIENCY, UNSPECIFIED: ICD-10-CM

## 2024-05-14 DIAGNOSIS — Z87.891 PERSONAL HISTORY OF NICOTINE DEPENDENCE: ICD-10-CM

## 2024-05-14 DIAGNOSIS — N18.31 CHRONIC KIDNEY DISEASE, STAGE 3A: ICD-10-CM

## 2024-05-14 DIAGNOSIS — F31.9 BIPOLAR DISORDER, UNSPECIFIED: ICD-10-CM

## 2024-05-14 DIAGNOSIS — E78.00 PURE HYPERCHOLESTEROLEMIA, UNSPECIFIED: ICD-10-CM

## 2024-05-14 DIAGNOSIS — I10 ESSENTIAL (PRIMARY) HYPERTENSION: ICD-10-CM

## 2024-05-14 DIAGNOSIS — Z81.8 FAMILY HISTORY OF OTHER MENTAL AND BEHAVIORAL DISORDERS: ICD-10-CM

## 2024-05-14 PROCEDURE — 99442: CPT

## 2024-05-14 NOTE — HISTORY OF PRESENT ILLNESS
[Home] : at home, [unfilled] , at the time of the visit. [Medical Office: ([facility])___] : at the medical office located in  [Verbal consent obtained from patient] : the patient, [unfilled] [FreeTextEntry1] : F/U Apt. to Review Test Results [de-identified] : F/U Apt. to Review Test Results

## 2024-05-14 NOTE — PHYSICAL EXAM
[Well Nourished] : well nourished [Well Developed] : well developed [Well-Appearing] : well-appearing [Normal Sclera/Conjunctiva] : normal sclera/conjunctiva [PERRL] : pupils equal round and reactive to light [EOMI] : extraocular movements intact [Normal Outer Ear/Nose] : the outer ears and nose were normal in appearance [Normal Oropharynx] : the oropharynx was normal [No Respiratory Distress] : no respiratory distress  [No Accessory Muscle Use] : no accessory muscle use [Clear to Auscultation] : lungs were clear to auscultation bilaterally [Normal Rate] : normal rate  [Regular Rhythm] : with a regular rhythm [Normal S1, S2] : normal S1 and S2 [No Murmur] : no murmur heard [No Carotid Bruits] : no carotid bruits [No Abdominal Bruit] : a ~M bruit was not heard ~T in the abdomen [No Varicosities] : no varicosities [Pedal Pulses Present] : the pedal pulses are present [No Edema] : there was no peripheral edema [No Palpable Aorta] : no palpable aorta [No Extremity Clubbing/Cyanosis] : no extremity clubbing/cyanosis [Soft] : abdomen soft [Non Tender] : non-tender [Non-distended] : non-distended [No Masses] : no abdominal mass palpated [No HSM] : no HSM [Normal Bowel Sounds] : normal bowel sounds [Normal Posterior Cervical Nodes] : no posterior cervical lymphadenopathy [Normal Anterior Cervical Nodes] : no anterior cervical lymphadenopathy [No CVA Tenderness] : no CVA  tenderness [No Spinal Tenderness] : no spinal tenderness [No Joint Swelling] : no joint swelling [___/1] : [unfilled]/1   [___/3] : [unfilled]/3 [___/5] : [unfilled]/5 [___/4] : [unfilled]/4 [___/2] : [unfilled]/2 [___/8] : [unfilled]/8 [Normal] : Normal [Normal Affect] : the affect was normal [Normal Insight/Judgement] : insight and judgment were intact [Comprehensive Foot Exam Normal] : Right and left foot were examined and both feet are normal. No ulcers in either foot. Toes are normal and with full ROM.  Normal tactile sensation with monofilament testing throughout both feet [de-identified] : Decreased ROM, Pain Associated with Mvt.  + Compression/Distraction/Spurlings Orthopaedic Tests [de-identified] : Decreased ROM, Pain Associated with Mvt. + Leg raise Tests [de-identified] : Decreased Strength to Upper and Lower Extremities. [de-identified] : Decreased DTR's to Upper and Lower Extremities [TextBox_2] : Yes [TextBox_4] : HS [SlumsTotal] : 30

## 2024-05-14 NOTE — REVIEW OF SYSTEMS
[Patient Intake Form Reviewed] : Patient intake form was reviewed [Negative] : Heme/Lymph [FreeTextEntry5] : HTN, HLD [de-identified] : Bipolar Disorder [FreeTextEntry1] : Low Vit. D, DM

## 2024-05-14 NOTE — COUNSELING
[Fall prevention counseling provided] : Fall prevention counseling provided [Adequate lighting] : Adequate lighting [No throw rugs] : No throw rugs [Use proper foot wear] : Use proper foot wear [Yes] : Risk of tobacco use and health benefits of smoking cessation discussed: Yes [AUDIT-C Screening administered and reviewed] : AUDIT-C Screening administered and reviewed [Potential consequences of obesity discussed] : Potential consequences of obesity discussed [Benefits of weight loss discussed] : Benefits of weight loss discussed [Encouraged to increase physical activity] : Encouraged to increase physical activity [Weigh Self Weekly] : weigh self weekly [Decrease Portions] : decrease portions [None] : None [Good understanding] : Patient has a good understanding of lifestyle changes and steps needed to achieve self management goal [Behavioral health counseling provided] : Behavioral health counseling provided [Sleep ___ hours/day] : Sleep [unfilled] hours/day [Engage in a relaxing activity] : Engage in a relaxing activity [Plan in advance] : Plan in advance

## 2024-05-14 NOTE — HEALTH RISK ASSESSMENT
[No falls in past year] : Patient reported no falls in the past year [0] : 2) Feeling down, depressed, or hopeless: Not at all (0) [I will adhere to the patient's wishes.] : I will adhere to the patient's wishes. [Time Spent: ___ minutes] : Time Spent: [unfilled] minutes [Current] : Current [AdvancecareDate] : 03/24

## 2024-05-17 ENCOUNTER — ASC (OUTPATIENT)
Dept: URBAN - METROPOLITAN AREA SURGERY 8 | Facility: SURGERY | Age: 64
Setting detail: OPHTHALMOLOGY
End: 2024-05-17
Payer: MEDICARE

## 2024-05-17 DIAGNOSIS — H40.1113: ICD-10-CM

## 2024-05-17 PROCEDURE — 65855 TRABECULOPLASTY LASER SURG: CPT | Mod: RT | Performed by: OPHTHALMOLOGY

## 2024-06-07 ENCOUNTER — OFFICE (OUTPATIENT)
Dept: URBAN - METROPOLITAN AREA CLINIC 94 | Facility: CLINIC | Age: 64
Setting detail: OPHTHALMOLOGY
End: 2024-06-07
Payer: MEDICARE

## 2024-06-07 DIAGNOSIS — H40.1113: ICD-10-CM

## 2024-06-07 PROCEDURE — 92012 INTRM OPH EXAM EST PATIENT: CPT | Performed by: OPHTHALMOLOGY

## 2024-06-07 ASSESSMENT — CONFRONTATIONAL VISUAL FIELD TEST (CVF)
OS_FINDINGS: FULL
OD_FINDINGS: FULL

## 2024-06-25 ENCOUNTER — APPOINTMENT (OUTPATIENT)
Dept: DERMATOLOGY | Facility: CLINIC | Age: 64
End: 2024-06-25
Payer: MEDICARE

## 2024-06-25 PROCEDURE — 99204 OFFICE O/P NEW MOD 45 MIN: CPT

## 2024-10-04 ENCOUNTER — OFFICE (OUTPATIENT)
Dept: URBAN - METROPOLITAN AREA CLINIC 94 | Facility: CLINIC | Age: 64
Setting detail: OPHTHALMOLOGY
End: 2024-10-04
Payer: MEDICARE

## 2024-10-04 DIAGNOSIS — H40.1123: ICD-10-CM

## 2024-10-04 DIAGNOSIS — H40.1113: ICD-10-CM

## 2024-10-04 PROCEDURE — 92250 FUNDUS PHOTOGRAPHY W/I&R: CPT | Performed by: OPHTHALMOLOGY

## 2024-10-04 PROCEDURE — 92014 COMPRE OPH EXAM EST PT 1/>: CPT | Performed by: OPHTHALMOLOGY

## 2024-10-04 ASSESSMENT — REFRACTION_MANIFEST
OS_ADD: +2.50
OS_SPHERE: +0.25
OS_ADD: +2.25
OD_SPHERE: +0.25
OD_AXIS: 080
OS_SPHERE: PLANO
OS_SPHERE: -0.25
OD_ADD: +2.50
OS_AXIS: 095
OD_ADD: +2.25
OS_AXIS: 092
OS_CYLINDER: -0.25
OS_VA2: 20/20(J1+)
OD_CYLINDER: -1.25
OD_AXIS: 080
OD_VA2: 20/20(J1+)
OD_SPHERE: PLANO
OD_SPHERE: +0.50
OD_VA1: 20/25
OD_CYLINDER: -1.75
OD_SPHERE: +0.50
OS_VA1: 20/20-1
OS_CYLINDER: -1.25
OS_AXIS: 180
OS_CYLINDER: -0.25
OD_VA1: 20/20
OS_VA1: 20/20
OD_AXIS: 070
OS_AXIS: 092
OD_VA1: 20/30
OS_SPHERE: PLANO
OD_CYLINDER: -1.50
OD_VA1: 20/25
OD_CYLINDER: -0.75
OD_AXIS: 089
OS_VA1: 20/25
OS_CYLINDER: -0.50
OS_VA1: 20/40

## 2024-10-04 ASSESSMENT — REFRACTION_CURRENTRX
OD_AXIS: 092
OD_AXIS: 078
OS_VPRISM_DIRECTION: SV
OS_SPHERE: +2.25
OS_SPHERE: PLANO
OD_CYLINDER: -0.75
OS_AXIS: 089
OS_VPRISM_DIRECTION: PROGS
OS_ADD: +2.25
OD_VPRISM_DIRECTION: SV
OD_SPHERE: +2.25
OD_CYLINDER: -2.50
OD_SPHERE: PLANO
OS_OVR_VA: 20/
OD_OVR_VA: 20/
OD_OVR_VA: 20/
OS_OVR_VA: 20/
OS_CYLINDER: -2.50
OD_VPRISM_DIRECTION: PROGS
OS_CYLINDER: -0.25
OD_ADD: +2.25
OS_AXIS: 092

## 2024-10-04 ASSESSMENT — REFRACTION_AUTOREFRACTION
OD_AXIS: 088
OS_CYLINDER: -1.50
OD_SPHERE: +1.25
OD_CYLINDER: -2.50
OS_SPHERE: +1.25
OS_AXIS: 103

## 2024-10-04 ASSESSMENT — KERATOMETRY
OD_K1POWER_DIOPTERS: 39.00
OS_K1POWER_DIOPTERS: 39.75
OS_K2POWER_DIOPTERS: 40.25
OD_AXISANGLE_DEGREES: 172
OS_AXISANGLE_DEGREES: 013
METHOD_AUTO_MANUAL: AUTO
OD_K2POWER_DIOPTERS: 41.25

## 2024-10-04 ASSESSMENT — VISUAL ACUITY
OS_BCVA: 20/70-1
OD_BCVA: 20/20

## 2024-10-04 ASSESSMENT — TONOMETRY
OD_IOP_MMHG: 18
OS_IOP_MMHG: 12

## 2024-10-04 ASSESSMENT — PACHYMETRY
OS_CT_UM: 505
OS_CT_CORRECTION: 3
OD_CT_CORRECTION: 2
OD_CT_UM: 514

## 2024-10-04 ASSESSMENT — CONFRONTATIONAL VISUAL FIELD TEST (CVF)
OD_FINDINGS: FULL
OS_FINDINGS: FULL

## 2024-11-20 ENCOUNTER — RX RENEWAL (OUTPATIENT)
Age: 64
End: 2024-11-20

## 2024-11-27 ENCOUNTER — OFFICE (OUTPATIENT)
Dept: URBAN - METROPOLITAN AREA CLINIC 94 | Facility: CLINIC | Age: 64
Setting detail: OPHTHALMOLOGY
End: 2024-11-27
Payer: MEDICARE

## 2024-11-27 DIAGNOSIS — H40.1113: ICD-10-CM

## 2024-11-27 DIAGNOSIS — H40.1123: ICD-10-CM

## 2024-11-27 PROCEDURE — 92012 INTRM OPH EXAM EST PATIENT: CPT | Performed by: OPHTHALMOLOGY

## 2024-11-27 ASSESSMENT — REFRACTION_CURRENTRX
OD_CYLINDER: -2.50
OS_SPHERE: +2.25
OD_SPHERE: PLANO
OD_VPRISM_DIRECTION: SV
OS_CYLINDER: -0.25
OS_VPRISM_DIRECTION: SV
OS_ADD: +2.25
OS_CYLINDER: -2.50
OS_AXIS: 089
OD_VPRISM_DIRECTION: PROGS
OS_VPRISM_DIRECTION: PROGS
OS_SPHERE: PLANO
OD_ADD: +2.25
OD_AXIS: 078
OD_SPHERE: +2.25
OD_OVR_VA: 20/
OD_AXIS: 092
OD_OVR_VA: 20/
OS_OVR_VA: 20/
OS_AXIS: 092
OD_CYLINDER: -0.75
OS_OVR_VA: 20/

## 2024-11-27 ASSESSMENT — PACHYMETRY
OD_CT_UM: 514
OS_CT_CORRECTION: 3
OS_CT_UM: 505
OD_CT_CORRECTION: 2

## 2024-11-27 ASSESSMENT — REFRACTION_MANIFEST
OD_ADD: +2.50
OS_SPHERE: +0.25
OS_SPHERE: PLANO
OD_CYLINDER: -1.50
OD_VA1: 20/25
OS_CYLINDER: -0.25
OS_ADD: +2.25
OD_VA2: 20/20(J1+)
OD_VA1: 20/30
OD_SPHERE: PLANO
OD_CYLINDER: -1.75
OS_AXIS: 092
OS_AXIS: 180
OD_VA1: 20/25
OS_VA1: 20/25
OD_AXIS: 080
OD_ADD: +2.25
OD_CYLINDER: -1.25
OS_ADD: +2.50
OD_SPHERE: +0.50
OD_SPHERE: +0.25
OS_VA2: 20/20(J1+)
OD_AXIS: 080
OS_VA1: 20/40
OD_VA1: 20/20
OS_CYLINDER: -0.50
OS_CYLINDER: -0.25
OD_SPHERE: +0.50
OS_AXIS: 095
OS_VA1: 20/20
OS_VA1: 20/20-1
OD_AXIS: 089
OS_AXIS: 092
OS_SPHERE: PLANO
OD_CYLINDER: -0.75
OD_AXIS: 070
OS_CYLINDER: -1.25
OS_SPHERE: -0.25

## 2024-11-27 ASSESSMENT — KERATOMETRY
OD_AXISANGLE_DEGREES: 172
OD_K1POWER_DIOPTERS: 39.00
OS_K2POWER_DIOPTERS: 40.25
OS_K1POWER_DIOPTERS: 39.75
METHOD_AUTO_MANUAL: AUTO
OS_AXISANGLE_DEGREES: 013
OD_K2POWER_DIOPTERS: 41.25

## 2024-11-27 ASSESSMENT — TONOMETRY: OS_IOP_MMHG: 10

## 2024-11-27 ASSESSMENT — CONFRONTATIONAL VISUAL FIELD TEST (CVF)
OD_FINDINGS: FULL
OS_FINDINGS: FULL

## 2024-11-27 ASSESSMENT — VISUAL ACUITY
OS_BCVA: 20/80
OD_BCVA: 20/20

## 2024-11-27 ASSESSMENT — REFRACTION_AUTOREFRACTION
OS_SPHERE: +1.25
OD_CYLINDER: -2.50
OS_CYLINDER: -1.50
OD_AXIS: 088
OD_SPHERE: +1.25
OS_AXIS: 103

## 2024-12-05 RX ORDER — LOSARTAN POTASSIUM 100 MG/1
100 TABLET, FILM COATED ORAL DAILY
Qty: 14 | Refills: 0 | Status: ACTIVE | COMMUNITY
Start: 2024-12-04 | End: 1900-01-01

## 2024-12-06 ENCOUNTER — OFFICE (OUTPATIENT)
Dept: URBAN - METROPOLITAN AREA CLINIC 94 | Facility: CLINIC | Age: 64
Setting detail: OPHTHALMOLOGY
End: 2024-12-06
Payer: MEDICARE

## 2024-12-06 DIAGNOSIS — H40.1123: ICD-10-CM

## 2024-12-06 DIAGNOSIS — H40.1113: ICD-10-CM

## 2024-12-06 PROCEDURE — 99213 OFFICE O/P EST LOW 20 MIN: CPT | Performed by: OPHTHALMOLOGY

## 2024-12-06 ASSESSMENT — KERATOMETRY
METHOD_AUTO_MANUAL: AUTO
OS_AXISANGLE_DEGREES: 167
OD_K2POWER_DIOPTERS: 41.75
OS_K2POWER_DIOPTERS: 40.75
OS_K1POWER_DIOPTERS: 40.25
OD_AXISANGLE_DEGREES: 174
OD_K1POWER_DIOPTERS: 40.00

## 2024-12-06 ASSESSMENT — REFRACTION_MANIFEST
OD_CYLINDER: -1.75
OD_AXIS: 089
OS_ADD: +2.25
OS_AXIS: 092
OD_SPHERE: +0.50
OS_AXIS: 180
OS_SPHERE: PLANO
OD_CYLINDER: -1.25
OS_VA1: 20/20-1
OS_AXIS: 092
OD_CYLINDER: -0.75
OS_CYLINDER: -0.25
OD_VA1: 20/30
OS_ADD: +2.50
OD_AXIS: 080
OD_SPHERE: PLANO
OD_VA1: 20/25
OD_AXIS: 080
OD_VA2: 20/20(J1+)
OD_SPHERE: +0.25
OD_SPHERE: +0.50
OD_AXIS: 070
OS_AXIS: 095
OS_VA1: 20/20
OD_ADD: +2.25
OD_VA1: 20/20
OS_SPHERE: PLANO
OS_VA1: 20/40
OS_VA2: 20/20(J1+)
OD_VA1: 20/25
OS_CYLINDER: -1.25
OS_CYLINDER: -0.50
OD_CYLINDER: -1.50
OS_SPHERE: -0.25
OS_SPHERE: +0.25
OS_VA1: 20/25
OS_CYLINDER: -0.25
OD_ADD: +2.50

## 2024-12-06 ASSESSMENT — REFRACTION_CURRENTRX
OD_CYLINDER: -0.75
OS_OVR_VA: 20/
OD_SPHERE: PLANO
OD_OVR_VA: 20/
OS_SPHERE: +2.25
OD_AXIS: 078
OD_VPRISM_DIRECTION: PROGS
OD_VPRISM_DIRECTION: SV
OS_AXIS: 089
OD_ADD: +2.25
OS_CYLINDER: -0.25
OS_VPRISM_DIRECTION: PROGS
OS_VPRISM_DIRECTION: SV
OD_OVR_VA: 20/
OS_OVR_VA: 20/
OD_CYLINDER: -2.50
OS_SPHERE: PLANO
OD_AXIS: 092
OS_ADD: +2.25
OD_SPHERE: +2.25
OS_CYLINDER: -2.50
OS_AXIS: 092

## 2024-12-06 ASSESSMENT — REFRACTION_AUTOREFRACTION
OS_SPHERE: +0.75
OD_SPHERE: +0.75
OD_CYLINDER: -2.00
OD_AXIS: 088
OS_AXIS: 091
OS_CYLINDER: -0.75

## 2024-12-06 ASSESSMENT — PACHYMETRY
OD_CT_UM: 514
OD_CT_CORRECTION: 2
OS_CT_CORRECTION: 3
OS_CT_UM: 505

## 2024-12-06 ASSESSMENT — TONOMETRY: OS_IOP_MMHG: 12

## 2024-12-06 ASSESSMENT — VISUAL ACUITY
OS_BCVA: 20/80
OD_BCVA: 20/20

## 2024-12-06 ASSESSMENT — CONFRONTATIONAL VISUAL FIELD TEST (CVF)
OS_FINDINGS: FULL
OD_FINDINGS: FULL

## 2024-12-17 ENCOUNTER — APPOINTMENT (OUTPATIENT)
Dept: FAMILY MEDICINE | Facility: CLINIC | Age: 64
End: 2024-12-17
Payer: MEDICARE

## 2024-12-17 VITALS
SYSTOLIC BLOOD PRESSURE: 130 MMHG | OXYGEN SATURATION: 99 % | DIASTOLIC BLOOD PRESSURE: 72 MMHG | TEMPERATURE: 97.8 F | WEIGHT: 133 LBS | HEART RATE: 45 BPM | RESPIRATION RATE: 14 BRPM | HEIGHT: 65 IN | BODY MASS INDEX: 22.16 KG/M2

## 2024-12-17 DIAGNOSIS — Z81.8 FAMILY HISTORY OF OTHER MENTAL AND BEHAVIORAL DISORDERS: ICD-10-CM

## 2024-12-17 DIAGNOSIS — Z87.891 PERSONAL HISTORY OF NICOTINE DEPENDENCE: ICD-10-CM

## 2024-12-17 PROBLEM — Z01.818 PRE-OPERATIVE CLEARANCE: Status: ACTIVE | Noted: 2024-12-17

## 2024-12-17 PROBLEM — H40.9 GLAUCOMA, UNSPECIFIED GLAUCOMA TYPE, UNSPECIFIED LATERALITY: Status: ACTIVE | Noted: 2019-07-01

## 2024-12-17 PROCEDURE — G2211 COMPLEX E/M VISIT ADD ON: CPT

## 2024-12-17 PROCEDURE — 99214 OFFICE O/P EST MOD 30 MIN: CPT

## 2024-12-20 ENCOUNTER — ASC (OUTPATIENT)
Dept: URBAN - METROPOLITAN AREA SURGERY 8 | Facility: SURGERY | Age: 64
Setting detail: OPHTHALMOLOGY
End: 2024-12-20
Payer: MEDICARE

## 2024-12-20 DIAGNOSIS — H40.1113: ICD-10-CM

## 2024-12-20 PROCEDURE — 66710 CILIARY TRANSSLERAL THERAPY: CPT | Mod: RT | Performed by: OPHTHALMOLOGY

## 2024-12-26 ENCOUNTER — APPOINTMENT (OUTPATIENT)
Dept: FAMILY MEDICINE | Facility: CLINIC | Age: 64
End: 2024-12-26
Payer: MEDICARE

## 2024-12-26 VITALS
TEMPERATURE: 97.7 F | HEIGHT: 65 IN | WEIGHT: 133 LBS | SYSTOLIC BLOOD PRESSURE: 146 MMHG | BODY MASS INDEX: 22.16 KG/M2 | RESPIRATION RATE: 14 BRPM | DIASTOLIC BLOOD PRESSURE: 88 MMHG

## 2024-12-26 DIAGNOSIS — I10 ESSENTIAL (PRIMARY) HYPERTENSION: ICD-10-CM

## 2024-12-26 DIAGNOSIS — H40.9 UNSPECIFIED GLAUCOMA: ICD-10-CM

## 2024-12-26 DIAGNOSIS — E78.00 PURE HYPERCHOLESTEROLEMIA, UNSPECIFIED: ICD-10-CM

## 2024-12-26 DIAGNOSIS — F41.9 ANXIETY DISORDER, UNSPECIFIED: ICD-10-CM

## 2024-12-26 DIAGNOSIS — E55.9 VITAMIN D DEFICIENCY, UNSPECIFIED: ICD-10-CM

## 2024-12-26 DIAGNOSIS — Z01.818 ENCOUNTER FOR OTHER PREPROCEDURAL EXAMINATION: ICD-10-CM

## 2024-12-26 DIAGNOSIS — N18.30 CHRONIC KIDNEY DISEASE, STAGE 3 UNSPECIFIED: ICD-10-CM

## 2024-12-26 PROCEDURE — G2211 COMPLEX E/M VISIT ADD ON: CPT

## 2024-12-26 PROCEDURE — 99214 OFFICE O/P EST MOD 30 MIN: CPT

## 2024-12-26 RX ORDER — BUPROPION HYDROCHLORIDE 75 MG/1
75 TABLET, FILM COATED ORAL TWICE DAILY
Qty: 180 | Refills: 2 | Status: ACTIVE | COMMUNITY
Start: 2024-12-26 | End: 1900-01-01

## 2024-12-27 ENCOUNTER — OFFICE (OUTPATIENT)
Dept: URBAN - METROPOLITAN AREA CLINIC 94 | Facility: CLINIC | Age: 64
Setting detail: OPHTHALMOLOGY
End: 2024-12-27
Payer: MEDICARE

## 2024-12-27 DIAGNOSIS — H40.1113: ICD-10-CM

## 2024-12-27 DIAGNOSIS — H40.1123: ICD-10-CM

## 2024-12-27 PROCEDURE — 99024 POSTOP FOLLOW-UP VISIT: CPT | Performed by: PHYSICIAN ASSISTANT

## 2024-12-27 ASSESSMENT — REFRACTION_MANIFEST
OS_VA2: 20/20(J1+)
OD_SPHERE: +0.50
OS_VA1: 20/25
OS_AXIS: 180
OD_ADD: +2.25
OD_VA1: 20/25
OS_CYLINDER: -0.25
OD_CYLINDER: -0.75
OS_CYLINDER: -1.25
OS_SPHERE: PLANO
OS_AXIS: 095
OD_SPHERE: +0.50
OD_VA1: 20/20
OD_CYLINDER: -1.75
OS_SPHERE: PLANO
OS_VA1: 20/20
OD_CYLINDER: -1.25
OD_SPHERE: +0.25
OD_VA2: 20/20(J1+)
OS_CYLINDER: -0.25
OS_CYLINDER: -0.50
OD_CYLINDER: -1.50
OS_ADD: +2.25
OD_AXIS: 070
OS_SPHERE: +0.25
OD_ADD: +2.50
OS_ADD: +2.50
OD_AXIS: 089
OS_VA1: 20/20-1
OS_SPHERE: -0.25
OD_SPHERE: PLANO
OD_VA1: 20/25
OD_VA1: 20/30
OS_AXIS: 092
OS_AXIS: 092
OD_AXIS: 080
OD_AXIS: 080
OS_VA1: 20/40

## 2024-12-27 ASSESSMENT — KERATOMETRY
OS_K2POWER_DIOPTERS: 40.50
OD_K1POWER_DIOPTERS: 40.25
OD_K2POWER_DIOPTERS: 41.25
OS_K1POWER_DIOPTERS: 40.25
OD_AXISANGLE_DEGREES: 171
METHOD_AUTO_MANUAL: AUTO
OS_AXISANGLE_DEGREES: 002

## 2024-12-27 ASSESSMENT — PACHYMETRY
OS_CT_UM: 505
OS_CT_CORRECTION: 3
OD_CT_UM: 514
OD_CT_CORRECTION: 2

## 2024-12-27 ASSESSMENT — CONFRONTATIONAL VISUAL FIELD TEST (CVF)
OS_FINDINGS: FULL
OD_FINDINGS: FULL

## 2024-12-27 ASSESSMENT — TONOMETRY: OS_IOP_MMHG: 13

## 2024-12-27 ASSESSMENT — REFRACTION_CURRENTRX
OS_AXIS: 089
OD_VPRISM_DIRECTION: SV
OD_CYLINDER: -0.75
OD_VPRISM_DIRECTION: PROGS
OS_SPHERE: +2.25
OD_OVR_VA: 20/
OD_OVR_VA: 20/
OS_VPRISM_DIRECTION: PROGS
OS_AXIS: 092
OD_SPHERE: +2.25
OD_ADD: +2.25
OS_CYLINDER: -2.50
OS_OVR_VA: 20/
OS_SPHERE: PLANO
OD_AXIS: 092
OD_SPHERE: PLANO
OS_CYLINDER: -0.25
OS_VPRISM_DIRECTION: SV
OD_AXIS: 078
OS_ADD: +2.25
OD_CYLINDER: -2.50
OS_OVR_VA: 20/

## 2024-12-27 ASSESSMENT — REFRACTION_AUTOREFRACTION
OD_AXIS: 084
OD_SPHERE: +1.00
OD_CYLINDER: -1.25
OS_CYLINDER: -0.75
OS_SPHERE: +0.50
OS_AXIS: 093

## 2024-12-27 ASSESSMENT — VISUAL ACUITY
OD_BCVA: 20/20
OS_BCVA: 20/25

## 2025-01-03 ENCOUNTER — OFFICE (OUTPATIENT)
Dept: URBAN - METROPOLITAN AREA CLINIC 94 | Facility: CLINIC | Age: 65
Setting detail: OPHTHALMOLOGY
End: 2025-01-03
Payer: MEDICARE

## 2025-01-03 ENCOUNTER — RX ONLY (RX ONLY)
Age: 65
End: 2025-01-03

## 2025-01-03 DIAGNOSIS — H40.1113: ICD-10-CM

## 2025-01-03 DIAGNOSIS — H40.1123: ICD-10-CM

## 2025-01-03 PROCEDURE — 99024 POSTOP FOLLOW-UP VISIT: CPT | Performed by: OPHTHALMOLOGY

## 2025-01-03 ASSESSMENT — REFRACTION_MANIFEST
OS_AXIS: 092
OD_AXIS: 089
OS_VA1: 20/20-1
OS_CYLINDER: -0.25
OS_ADD: +2.50
OS_SPHERE: PLANO
OD_CYLINDER: -1.75
OD_SPHERE: +0.25
OS_SPHERE: PLANO
OD_CYLINDER: -1.25
OD_VA1: 20/25
OD_VA2: 20/20(J1+)
OD_VA1: 20/25
OS_ADD: +2.25
OS_CYLINDER: -0.50
OD_ADD: +2.50
OS_SPHERE: -0.25
OS_VA1: 20/25
OD_ADD: +2.25
OD_SPHERE: PLANO
OS_CYLINDER: -0.25
OS_VA2: 20/20(J1+)
OS_AXIS: 180
OS_VA1: 20/40
OD_CYLINDER: -0.75
OS_VA1: 20/20
OS_CYLINDER: -1.25
OS_AXIS: 092
OD_AXIS: 080
OS_AXIS: 095
OD_AXIS: 080
OD_SPHERE: +0.50
OD_VA1: 20/30
OD_AXIS: 070
OD_SPHERE: +0.50
OD_VA1: 20/20
OD_CYLINDER: -1.50
OS_SPHERE: +0.25

## 2025-01-03 ASSESSMENT — REFRACTION_CURRENTRX
OD_CYLINDER: -0.75
OS_ADD: +2.25
OS_SPHERE: PLANO
OS_OVR_VA: 20/
OS_AXIS: 092
OS_VPRISM_DIRECTION: PROGS
OD_AXIS: 078
OD_CYLINDER: -2.50
OD_OVR_VA: 20/
OD_SPHERE: +2.25
OS_VPRISM_DIRECTION: SV
OD_ADD: +2.25
OD_OVR_VA: 20/
OD_VPRISM_DIRECTION: SV
OS_OVR_VA: 20/
OS_CYLINDER: -2.50
OS_AXIS: 089
OS_CYLINDER: -0.25
OD_VPRISM_DIRECTION: PROGS
OS_SPHERE: +2.25
OD_AXIS: 092
OD_SPHERE: PLANO

## 2025-01-03 ASSESSMENT — REFRACTION_AUTOREFRACTION
OS_CYLINDER: -1.00
OD_SPHERE: 0.00
OD_CYLINDER: -0.50
OS_SPHERE: +0.75
OS_AXIS: 086
OD_AXIS: 125

## 2025-01-03 ASSESSMENT — PACHYMETRY
OS_CT_UM: 505
OD_CT_CORRECTION: 2
OD_CT_UM: 514
OS_CT_CORRECTION: 3

## 2025-01-03 ASSESSMENT — KERATOMETRY
OS_K2POWER_DIOPTERS: 40.50
OD_K1POWER_DIOPTERS: 41.00
METHOD_AUTO_MANUAL: AUTO
OS_AXISANGLE_DEGREES: 167
OS_K1POWER_DIOPTERS: 40.25
OD_K2POWER_DIOPTERS: 41.50
OD_AXISANGLE_DEGREES: 131

## 2025-01-03 ASSESSMENT — VISUAL ACUITY
OS_BCVA: HM
OD_BCVA: 20/20-1

## 2025-01-03 ASSESSMENT — TONOMETRY: OS_IOP_MMHG: 11

## 2025-01-03 ASSESSMENT — CONFRONTATIONAL VISUAL FIELD TEST (CVF)
OS_FINDINGS: FULL
OD_FINDINGS: FULL

## 2025-01-15 ENCOUNTER — NON-APPOINTMENT (OUTPATIENT)
Age: 65
End: 2025-01-15

## 2025-03-04 ENCOUNTER — APPOINTMENT (OUTPATIENT)
Dept: FAMILY MEDICINE | Facility: CLINIC | Age: 65
End: 2025-03-04

## 2025-03-04 DIAGNOSIS — Z12.31 ENCOUNTER FOR SCREENING MAMMOGRAM FOR MALIGNANT NEOPLASM OF BREAST: ICD-10-CM

## 2025-03-04 DIAGNOSIS — M54.12 RADICULOPATHY, CERVICAL REGION: ICD-10-CM

## 2025-03-04 DIAGNOSIS — F41.9 ANXIETY DISORDER, UNSPECIFIED: ICD-10-CM

## 2025-03-04 DIAGNOSIS — E78.00 PURE HYPERCHOLESTEROLEMIA, UNSPECIFIED: ICD-10-CM

## 2025-03-10 ENCOUNTER — APPOINTMENT (OUTPATIENT)
Dept: FAMILY MEDICINE | Facility: CLINIC | Age: 65
End: 2025-03-10
Payer: MEDICARE

## 2025-03-10 ENCOUNTER — LABORATORY RESULT (OUTPATIENT)
Age: 65
End: 2025-03-10

## 2025-03-10 VITALS
BODY MASS INDEX: 24.32 KG/M2 | OXYGEN SATURATION: 98 % | TEMPERATURE: 97.3 F | SYSTOLIC BLOOD PRESSURE: 150 MMHG | HEART RATE: 44 BPM | HEIGHT: 65 IN | DIASTOLIC BLOOD PRESSURE: 90 MMHG | WEIGHT: 146 LBS | RESPIRATION RATE: 14 BRPM

## 2025-03-10 DIAGNOSIS — Z81.8 FAMILY HISTORY OF OTHER MENTAL AND BEHAVIORAL DISORDERS: ICD-10-CM

## 2025-03-10 DIAGNOSIS — N18.30 CHRONIC KIDNEY DISEASE, STAGE 3 UNSPECIFIED: ICD-10-CM

## 2025-03-10 DIAGNOSIS — F31.9 BIPOLAR DISORDER, UNSPECIFIED: ICD-10-CM

## 2025-03-10 DIAGNOSIS — I10 ESSENTIAL (PRIMARY) HYPERTENSION: ICD-10-CM

## 2025-03-10 DIAGNOSIS — H40.9 UNSPECIFIED GLAUCOMA: ICD-10-CM

## 2025-03-10 DIAGNOSIS — E78.00 PURE HYPERCHOLESTEROLEMIA, UNSPECIFIED: ICD-10-CM

## 2025-03-10 DIAGNOSIS — N18.31 CHRONIC KIDNEY DISEASE, STAGE 3A: ICD-10-CM

## 2025-03-10 DIAGNOSIS — D72.819 DECREASED WHITE BLOOD CELL COUNT, UNSPECIFIED: ICD-10-CM

## 2025-03-10 DIAGNOSIS — E55.9 VITAMIN D DEFICIENCY, UNSPECIFIED: ICD-10-CM

## 2025-03-10 DIAGNOSIS — Z87.891 PERSONAL HISTORY OF NICOTINE DEPENDENCE: ICD-10-CM

## 2025-03-10 PROCEDURE — 99214 OFFICE O/P EST MOD 30 MIN: CPT

## 2025-03-10 PROCEDURE — G2211 COMPLEX E/M VISIT ADD ON: CPT

## 2025-03-10 PROCEDURE — 36415 COLL VENOUS BLD VENIPUNCTURE: CPT

## 2025-03-11 LAB
25(OH)D3 SERPL-MCNC: 32.8 NG/ML
ALBUMIN SERPL ELPH-MCNC: 3.9 G/DL
ALP BLD-CCNC: 73 U/L
ALT SERPL-CCNC: 8 U/L
ANION GAP SERPL CALC-SCNC: 9 MMOL/L
APPEARANCE: CLEAR
AST SERPL-CCNC: 12 U/L
BASOPHILS # BLD AUTO: 0.01 K/UL
BASOPHILS NFR BLD AUTO: 0.3 %
BILIRUB SERPL-MCNC: 0.2 MG/DL
BILIRUBIN URINE: NEGATIVE
BLOOD URINE: NEGATIVE
BUN SERPL-MCNC: 33 MG/DL
C PEPTIDE SERPL-MCNC: 4.3 NG/ML
CALCIUM SERPL-MCNC: 9 MG/DL
CHLORIDE SERPL-SCNC: 105 MMOL/L
CHOLEST SERPL-MCNC: 210 MG/DL
CO2 SERPL-SCNC: 27 MMOL/L
COLOR: YELLOW
CREAT SERPL-MCNC: 1.75 MG/DL
CREAT SPEC-SCNC: 52 MG/DL
EGFRCR SERPLBLD CKD-EPI 2021: 32 ML/MIN/1.73M2
EOSINOPHIL # BLD AUTO: 0.05 K/UL
EOSINOPHIL NFR BLD AUTO: 1.5 %
ESTIMATED AVERAGE GLUCOSE: 100 MG/DL
FERRITIN SERPL-MCNC: 154 NG/ML
FOLATE SERPL-MCNC: 10.1 NG/ML
GLUCOSE QUALITATIVE U: NEGATIVE MG/DL
GLUCOSE SERPL-MCNC: 79 MG/DL
HBA1C MFR BLD HPLC: 5.1 %
HCT VFR BLD CALC: 36.7 %
HDLC SERPL-MCNC: 97 MG/DL
HGB BLD-MCNC: 11.7 G/DL
IMM GRANULOCYTES NFR BLD AUTO: 0.6 %
IRON SATN MFR SERPL: 21 %
IRON SERPL-MCNC: 64 UG/DL
KETONES URINE: NEGATIVE MG/DL
LDLC SERPL CALC-MCNC: 101 MG/DL
LEUKOCYTE ESTERASE URINE: ABNORMAL
LYMPHOCYTES # BLD AUTO: 0.9 K/UL
LYMPHOCYTES NFR BLD AUTO: 27.4 %
MAN DIFF?: NORMAL
MCHC RBC-ENTMCNC: 30.5 PG
MCHC RBC-ENTMCNC: 31.9 G/DL
MCV RBC AUTO: 95.8 FL
MICROALBUMIN 24H UR DL<=1MG/L-MCNC: 7.9 MG/DL
MICROALBUMIN/CREAT 24H UR-RTO: 151 MG/G
MONOCYTES # BLD AUTO: 0.41 K/UL
MONOCYTES NFR BLD AUTO: 12.5 %
NEUTROPHILS # BLD AUTO: 1.9 K/UL
NEUTROPHILS NFR BLD AUTO: 57.7 %
NITRITE URINE: NEGATIVE
NONHDLC SERPL-MCNC: 113 MG/DL
PH URINE: 6
PLATELET # BLD AUTO: 188 K/UL
POTASSIUM SERPL-SCNC: 4.7 MMOL/L
PROT SERPL-MCNC: 7.7 G/DL
PROTEIN URINE: NEGATIVE MG/DL
RBC # BLD: 3.83 M/UL
RBC # FLD: 13.2 %
SODIUM SERPL-SCNC: 141 MMOL/L
SPECIFIC GRAVITY URINE: 1.01
T4 SERPL-MCNC: 9.6 UG/DL
TIBC SERPL-MCNC: 303 UG/DL
TRIGL SERPL-MCNC: 70 MG/DL
TSH SERPL-ACNC: 1.49 UIU/ML
UIBC SERPL-MCNC: 239 UG/DL
UROBILINOGEN URINE: 0.2 MG/DL
VIT B12 SERPL-MCNC: 411 PG/ML
WBC # FLD AUTO: 3.29 K/UL

## 2025-04-28 ENCOUNTER — APPOINTMENT (OUTPATIENT)
Dept: OTOLARYNGOLOGY | Facility: CLINIC | Age: 65
End: 2025-04-28

## 2025-05-29 ENCOUNTER — EMERGENCY (EMERGENCY)
Facility: HOSPITAL | Age: 65
LOS: 1 days | End: 2025-05-29
Attending: EMERGENCY MEDICINE
Payer: MEDICARE

## 2025-05-29 VITALS
SYSTOLIC BLOOD PRESSURE: 173 MMHG | OXYGEN SATURATION: 97 % | HEART RATE: 72 BPM | RESPIRATION RATE: 20 BRPM | DIASTOLIC BLOOD PRESSURE: 91 MMHG | TEMPERATURE: 99 F

## 2025-05-29 DIAGNOSIS — H26.9 UNSPECIFIED CATARACT: Chronic | ICD-10-CM

## 2025-05-29 DIAGNOSIS — F25.9 SCHIZOAFFECTIVE DISORDER, UNSPECIFIED: ICD-10-CM

## 2025-05-29 LAB
ALBUMIN SERPL ELPH-MCNC: 4 G/DL — SIGNIFICANT CHANGE UP (ref 3.3–5.2)
ALP SERPL-CCNC: 62 U/L — SIGNIFICANT CHANGE UP (ref 40–120)
ALT FLD-CCNC: 21 U/L — SIGNIFICANT CHANGE UP
ANION GAP SERPL CALC-SCNC: 17 MMOL/L — SIGNIFICANT CHANGE UP (ref 5–17)
APAP SERPL-MCNC: <3 UG/ML — LOW (ref 10–26)
AST SERPL-CCNC: 39 U/L — HIGH
BILIRUB SERPL-MCNC: 0.5 MG/DL — SIGNIFICANT CHANGE UP (ref 0.4–2)
BUN SERPL-MCNC: 35.5 MG/DL — HIGH (ref 8–20)
CALCIUM SERPL-MCNC: 9.7 MG/DL — SIGNIFICANT CHANGE UP (ref 8.4–10.5)
CHLORIDE SERPL-SCNC: 105 MMOL/L — SIGNIFICANT CHANGE UP (ref 96–108)
CO2 SERPL-SCNC: 19 MMOL/L — LOW (ref 22–29)
CREAT SERPL-MCNC: 2.78 MG/DL — HIGH (ref 0.5–1.3)
EGFR: 19 ML/MIN/1.73M2 — LOW
EGFR: 19 ML/MIN/1.73M2 — LOW
ETHANOL SERPL-MCNC: <10 MG/DL — SIGNIFICANT CHANGE UP (ref 0–9)
GLUCOSE SERPL-MCNC: 72 MG/DL — SIGNIFICANT CHANGE UP (ref 70–99)
HCT VFR BLD CALC: 37.3 % — SIGNIFICANT CHANGE UP (ref 34.5–45)
HGB BLD-MCNC: 12.6 G/DL — SIGNIFICANT CHANGE UP (ref 11.5–15.5)
LITHIUM SERPL-MCNC: <0.1 MMOL/L — LOW (ref 0.5–1.5)
MCHC RBC-ENTMCNC: 29.4 PG — SIGNIFICANT CHANGE UP (ref 27–34)
MCHC RBC-ENTMCNC: 33.8 G/DL — SIGNIFICANT CHANGE UP (ref 32–36)
MCV RBC AUTO: 87.1 FL — SIGNIFICANT CHANGE UP (ref 80–100)
NRBC # BLD AUTO: 0 K/UL — SIGNIFICANT CHANGE UP (ref 0–0)
NRBC # FLD: 0 K/UL — SIGNIFICANT CHANGE UP (ref 0–0)
NRBC BLD AUTO-RTO: 0 /100 WBCS — SIGNIFICANT CHANGE UP (ref 0–0)
OSMOLALITY SERPL: 304 MOSMOL/KG — HIGH (ref 280–301)
PLATELET # BLD AUTO: 183 K/UL — SIGNIFICANT CHANGE UP (ref 150–400)
PMV BLD: 10.5 FL — SIGNIFICANT CHANGE UP (ref 7–13)
POTASSIUM SERPL-MCNC: 4.2 MMOL/L — SIGNIFICANT CHANGE UP (ref 3.5–5.3)
POTASSIUM SERPL-SCNC: 4.2 MMOL/L — SIGNIFICANT CHANGE UP (ref 3.5–5.3)
PROT SERPL-MCNC: 8.4 G/DL — SIGNIFICANT CHANGE UP (ref 6.6–8.7)
RAPID RVP RESULT: SIGNIFICANT CHANGE UP
RBC # BLD: 4.28 M/UL — SIGNIFICANT CHANGE UP (ref 3.8–5.2)
RBC # FLD: 14 % — SIGNIFICANT CHANGE UP (ref 10.3–14.5)
SALICYLATES SERPL-MCNC: <0.6 MG/DL — LOW (ref 10–20)
SARS-COV-2 RNA SPEC QL NAA+PROBE: SIGNIFICANT CHANGE UP
SODIUM SERPL-SCNC: 141 MMOL/L — SIGNIFICANT CHANGE UP (ref 135–145)
WBC # BLD: 2.85 K/UL — LOW (ref 3.8–10.5)
WBC # FLD AUTO: 2.85 K/UL — LOW (ref 3.8–10.5)

## 2025-05-29 PROCEDURE — 76775 US EXAM ABDO BACK WALL LIM: CPT | Mod: 26

## 2025-05-29 PROCEDURE — 90792 PSYCH DIAG EVAL W/MED SRVCS: CPT

## 2025-05-29 PROCEDURE — 99223 1ST HOSP IP/OBS HIGH 75: CPT

## 2025-05-29 PROCEDURE — 93010 ELECTROCARDIOGRAM REPORT: CPT

## 2025-05-29 RX ORDER — LORAZEPAM 4 MG/ML
2 VIAL (ML) INJECTION ONCE
Refills: 0 | Status: DISCONTINUED | OUTPATIENT
Start: 2025-05-29 | End: 2025-05-29

## 2025-05-29 RX ADMIN — Medication 1000 MILLILITER(S): at 21:42

## 2025-05-29 RX ADMIN — Medication 1000 MILLILITER(S): at 20:49

## 2025-05-29 RX ADMIN — Medication 2 MILLIGRAM(S): at 19:37
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
## 2025-05-29 NOTE — ED BEHAVIORAL HEALTH ASSESSMENT NOTE - RISK ASSESSMENT
rf:  mental health condition(s)  stressors    pf:  connected to care  family support  domiciled, educated

## 2025-05-29 NOTE — ED PROVIDER NOTE - OBJECTIVE STATEMENT
As per EMS, patient was brought in due to erratic behavior 64 year old woman  domiciled unemployed pph schizoaffective bipolar prior inpatient psychiatric hospitalization outpatient mental health follow up (through fsl act w prior aot order) no prior suicide attempts or non suicidal self injurious behavior no instances of violence aggression violation of orders of protection, pmh breast cancer chronic kidney disease glaucoma hypertension who was brought in by ems activated by  for psychiatric evaluation.

## 2025-05-29 NOTE — ED ADULT TRIAGE NOTE - BP NONINVASIVE DIASTOLIC (MM HG)
Subjective   Patient ID: Sarita Carlson is a 68 y.o. female.    Patient is a 68-year-old female who complains of increasing redness and discharge to the site of 2 discrete cat bite wounds to the patient's right hand and wrist that she sustained yesterday.  Patient reports that she was bitten by her daughters pet cat.  Cat's immunizations are reported to be current.  Patient did not seek medical care prior to arrival at this urgent care facility today.  Patient denies paresthesia or paralysis to right hand and fingers and states that her  is intact.  Patient reports that she is able to flex and extend her right wrist although it is painful to do so.      Animal Bite  The following portions of the chart were reviewed this encounter and updated as appropriate:  Allergies       Review of Systems   Skin:  Positive for wound.        Cat Bite Right Hand and Wrist   All other systems reviewed and are negative.  Objective   Physical Exam  Vitals and nursing note reviewed.   Constitutional:       Appearance: Normal appearance. She is normal weight.   HENT:      Head: Normocephalic.   Cardiovascular:      Pulses: Normal pulses.   Pulmonary:      Effort: Pulmonary effort is normal.      Breath sounds: Normal breath sounds.   Musculoskeletal:         General: Swelling, tenderness and signs of injury present. No deformity. Normal range of motion.   Skin:     General: Skin is warm and dry.      Capillary Refill: Capillary refill takes less than 2 seconds.      Findings: Erythema present. No bruising.      Comments: Two discrete puncture wounds are noted to the dorsal right hand and radial right wrist.  There is surrounding erythema with mild soft tissue edema.  No bleeding, serous appearing fluid is noted on exam.  No rash or ecchymosis is noted to the right hand and wrist.  MSP to the right fingers is fully intact and  is strong and equal.  Remainder of exam to the right fingers, hand and arm is unremarkable.    Neurological:      General: No focal deficit present.      Mental Status: She is alert and oriented to person, place, and time.   Psychiatric:         Mood and Affect: Mood normal.         Behavior: Behavior normal.         Thought Content: Thought content normal.         Judgment: Judgment normal.     Assessment/Plan   Physical exam findings as noted above.  Patient was provided with a prescription for Augmentin 875-125 mg and wound care instructions were discussed.  Patient was very, very clearly advised to report to an emergency department if she notes the slightest worsening of her symptoms as at that time laboratory testing, blood cultures, IV antibiotics and possible hospital admission would be required.  Patient verbalizes good understanding of the above instructions.    CLINICAL IMPRESSION:  Cat Bite Right Hand/Wrist;  Cellulitis Cat Bite Wound    Diagnoses and all orders for this visit:  Cat bite of right wrist, initial encounter  -     amoxicillin-pot clavulanate (Augmentin) 875-125 mg tablet; Take 1 tablet by mouth 2 times a day for 10 days.    Patient disposition: Home   91

## 2025-05-29 NOTE — ED CDU PROVIDER INITIAL DAY NOTE - CLINICAL SUMMARY MEDICAL DECISION MAKING FREE TEXT BOX
labs and ECG results reviewed. psychiatry assessment noted. nephrology consulted for AM. will place in obs

## 2025-05-29 NOTE — ED PROVIDER NOTE - CLINICAL SUMMARY MEDICAL DECISION MAKING FREE TEXT BOX
labs and ECG results reviewed. psychiatry assessment noted. labs and ECG results reviewed. psychiatry assessment noted. nephrology consulted for AM. will place in obs

## 2025-05-29 NOTE — ED CDU PROVIDER INITIAL DAY NOTE - OBJECTIVE STATEMENT
64 year old woman  domiciled unemployed pph schizoaffective bipolar prior inpatient psychiatric hospitalization outpatient mental health follow up (through fsl act w prior aot order) no prior suicide attempts or non suicidal self injurious behavior no instances of violence aggression violation of orders of protection, pmh breast cancer chronic kidney disease glaucoma hypertension who was brought in by ems activated by  for psychiatric evaluation.

## 2025-05-29 NOTE — ED ADULT NURSE NOTE - OBJECTIVE STATEMENT
rcvd pt  in yellow gown with 1:1 at bedside, pt awake and confused, sent for bizarre behavior, pt has flight of ideas and unable to provide hx , stating "I was sent because people don't like me" resp even and unlabored no distress noted , denies any pain

## 2025-05-29 NOTE — ED BEHAVIORAL HEALTH ASSESSMENT NOTE - SUMMARY
64 year old woman  domiciled unemployed pph schizoaffective bipolar prior inpatient psychiatric hospitalization outpatient mental health follow up (through fsl act w prior aot order) no prior suicide attempts or non suicidal self injurious behavior no instances of violence aggression violation of orders of protection, pmh breast cancer chronic kidney disease glaucoma hypertension who was brought in by ems activated by  for psychiatric evaluation.  h/o schizoaffective bipolar. despite reported compliance with psychotropic medication regimen (per ) presenting with symptoms/narrative suggestive of psychotic decompensation. awaiting medical clearance. however when medically cleared will likely benefit from inpatient psychiatric hospitalization. Would benefit from inpatient psychiatric admission for assurance of safety, further stabilization, medication management, and diagnostic clarification.

## 2025-05-29 NOTE — ED ADULT NURSE NOTE - SUICIDE SCREENING QUESTION 1
Patient unable to complete
Show Topical Anesthesia Variable?: Yes
Medical Necessity Clause: This procedure was medically necessary because the lesions that were treated were:
Post-Care Instructions: I reviewed with the patient in detail post-care instructions. Patient is to wear sunprotection, and avoid picking at any of the treated lesions. Pt may apply Vaseline to crusted or scabbing areas.
Application Tool (Optional): Liquid Nitrogen Sprayer
Medical Necessity Information: It is in your best interest to select a reason for this procedure from the list below. All of these items fulfill various CMS LCD requirements except the new and changing color options.
Consent: The patient's consent was obtained including but not limited to risks of crusting, scabbing, blistering, scarring, darker or lighter pigmentary change, recurrence, incomplete removal and infection.
Add 52 Modifier (Optional): no
Detail Level: Detailed
Spray Paint Text: The liquid nitrogen was applied to the skin utilizing a spray paint frosting technique.

## 2025-05-29 NOTE — ED ADULT NURSE NOTE - NS ED NURSE LEVEL OF CONSCIOUSNESS AFFECT
Please enter lab orders for the patient's upcoming physical appointment. Physical scheduled: Your appointments     Date & Time Appointment Department [facility])    Oct 06, 2022  7:45 AM CDT Physical - Established with MD TAWANA Hill Stover, [facility], [address] [address], [place]  ([address] [place] [address])            [address] [id number] [place] [id number] [phone]         Preferred lab: [address] [facility])     The patient has been notified to complete fasting labs prior to their physical appointment. Calm

## 2025-05-29 NOTE — ED PROVIDER NOTE - CARE PLAN
Principal Discharge DX:	Schizoaffective disorder  Secondary Diagnosis:	Chronic renal insufficiency   1

## 2025-05-29 NOTE — ED ADULT TRIAGE NOTE - CHIEF COMPLAINT QUOTE
Pt BIBA c/o agitated behavior. As per report, pt has bipolar disorder and was not acting like herself.  Pt unable to provide reliable hx.  Flight of ideas demonstrated.  Pt moved to ambulance triage for evaluation. Denies SI/HI.  Pt lost control of bowels prior to EMS arrival.

## 2025-05-30 LAB
ANION GAP SERPL CALC-SCNC: 12 MMOL/L — SIGNIFICANT CHANGE UP (ref 5–17)
APPEARANCE UR: CLEAR — SIGNIFICANT CHANGE UP
BILIRUB UR-MCNC: NEGATIVE — SIGNIFICANT CHANGE UP
BUN SERPL-MCNC: 22.2 MG/DL — HIGH (ref 8–20)
CALCIUM SERPL-MCNC: 8.3 MG/DL — LOW (ref 8.4–10.5)
CHLORIDE SERPL-SCNC: 111 MMOL/L — HIGH (ref 96–108)
CO2 SERPL-SCNC: 20 MMOL/L — LOW (ref 22–29)
COLOR SPEC: YELLOW — SIGNIFICANT CHANGE UP
CREAT SERPL-MCNC: 1.72 MG/DL — HIGH (ref 0.5–1.3)
DIFF PNL FLD: NEGATIVE — SIGNIFICANT CHANGE UP
EGFR: 33 ML/MIN/1.73M2 — LOW
EGFR: 33 ML/MIN/1.73M2 — LOW
GLUCOSE SERPL-MCNC: 63 MG/DL — LOW (ref 70–99)
GLUCOSE UR QL: NEGATIVE MG/DL — SIGNIFICANT CHANGE UP
KETONES UR QL: ABNORMAL MG/DL
LEUKOCYTE ESTERASE UR-ACNC: NEGATIVE — SIGNIFICANT CHANGE UP
NITRITE UR-MCNC: NEGATIVE — SIGNIFICANT CHANGE UP
PH UR: 5.5 — SIGNIFICANT CHANGE UP (ref 5–8)
POTASSIUM SERPL-MCNC: 4.1 MMOL/L — SIGNIFICANT CHANGE UP (ref 3.5–5.3)
POTASSIUM SERPL-SCNC: 4.1 MMOL/L — SIGNIFICANT CHANGE UP (ref 3.5–5.3)
PROT UR-MCNC: SIGNIFICANT CHANGE UP MG/DL
SODIUM SERPL-SCNC: 143 MMOL/L — SIGNIFICANT CHANGE UP (ref 135–145)
SP GR SPEC: 1.01 — SIGNIFICANT CHANGE UP (ref 1–1.03)
UROBILINOGEN FLD QL: 1 MG/DL — SIGNIFICANT CHANGE UP (ref 0.2–1)
VALPROATE SERPL-MCNC: <3.7 UG/ML — LOW (ref 50–100)

## 2025-05-30 PROCEDURE — 99215 OFFICE O/P EST HI 40 MIN: CPT

## 2025-05-30 PROCEDURE — 99233 SBSQ HOSP IP/OBS HIGH 50: CPT

## 2025-05-30 PROCEDURE — 99283 EMERGENCY DEPT VISIT LOW MDM: CPT

## 2025-05-30 RX ORDER — BRIMONIDINE TARTRATE 1.5 MG/ML
1 SOLUTION/ DROPS OPHTHALMIC
Refills: 0 | Status: DISCONTINUED | OUTPATIENT
Start: 2025-05-30 | End: 2025-05-30

## 2025-05-30 RX ORDER — MIDAZOLAM IN 0.9 % SOD.CHLORID 1 MG/ML
2 PLASTIC BAG, INJECTION (ML) INTRAVENOUS ONCE
Refills: 0 | Status: DISCONTINUED | OUTPATIENT
Start: 2025-05-30 | End: 2025-05-30

## 2025-05-30 RX ORDER — ACETAZOLAMIDE 250 MG/1
250 TABLET ORAL
Refills: 0 | Status: ACTIVE | OUTPATIENT
Start: 2025-05-30 | End: 2026-04-28

## 2025-05-30 RX ORDER — HALOPERIDOL 10 MG/1
5 TABLET ORAL ONCE
Refills: 0 | Status: COMPLETED | OUTPATIENT
Start: 2025-05-30 | End: 2025-05-30

## 2025-05-30 RX ORDER — DORZOLAMIDE 20 MG/ML
1 SOLUTION/ DROPS OPHTHALMIC THREE TIMES A DAY
Refills: 0 | Status: DISCONTINUED | OUTPATIENT
Start: 2025-05-30 | End: 2025-05-30

## 2025-05-30 RX ORDER — DORZOLAMIDE 20 MG/ML
1 SOLUTION/ DROPS OPHTHALMIC THREE TIMES A DAY
Refills: 0 | Status: ACTIVE | OUTPATIENT
Start: 2025-05-30 | End: 2026-04-28

## 2025-05-30 RX ORDER — TIMOLOL MALEATE 6.8 MG/ML
1 SOLUTION OPHTHALMIC
Refills: 0 | Status: ACTIVE | OUTPATIENT
Start: 2025-05-30 | End: 2026-04-28

## 2025-05-30 RX ORDER — LOSARTAN POTASSIUM 100 MG/1
100 TABLET, FILM COATED ORAL DAILY
Refills: 0 | Status: ACTIVE | OUTPATIENT
Start: 2025-05-30 | End: 2026-04-28

## 2025-05-30 RX ORDER — AMLODIPINE BESYLATE 10 MG/1
10 TABLET ORAL DAILY
Refills: 0 | Status: ACTIVE | OUTPATIENT
Start: 2025-05-30 | End: 2026-04-28

## 2025-05-30 RX ORDER — BENZTROPINE MESYLATE 2 MG
0.5 TABLET ORAL
Refills: 0 | Status: ACTIVE | OUTPATIENT
Start: 2025-05-30 | End: 2026-04-28

## 2025-05-30 RX ORDER — AMLODIPINE BESYLATE 10 MG/1
5 TABLET ORAL ONCE
Refills: 0 | Status: DISCONTINUED | OUTPATIENT
Start: 2025-05-30 | End: 2025-05-30

## 2025-05-30 RX ORDER — BUPROPION HYDROBROMIDE 522 MG/1
150 TABLET, EXTENDED RELEASE ORAL DAILY
Refills: 0 | Status: ACTIVE | OUTPATIENT
Start: 2025-05-30 | End: 2026-04-28

## 2025-05-30 RX ORDER — AMLODIPINE BESYLATE 10 MG/1
10 TABLET ORAL ONCE
Refills: 0 | Status: COMPLETED | OUTPATIENT
Start: 2025-05-30 | End: 2025-05-30

## 2025-05-30 RX ORDER — HALOPERIDOL 10 MG/1
1 TABLET ORAL
Refills: 0 | Status: ACTIVE | OUTPATIENT
Start: 2025-05-30 | End: 2026-04-28

## 2025-05-30 RX ORDER — LATANOPROST PF 0.05 MG/ML
1 SOLUTION/ DROPS OPHTHALMIC AT BEDTIME
Refills: 0 | Status: ACTIVE | OUTPATIENT
Start: 2025-05-30 | End: 2026-04-28

## 2025-05-30 RX ADMIN — Medication 2 MILLIGRAM(S): at 11:58

## 2025-05-30 RX ADMIN — DORZOLAMIDE 1 DROP(S): 20 SOLUTION/ DROPS OPHTHALMIC at 14:24

## 2025-05-30 RX ADMIN — DORZOLAMIDE 1 DROP(S): 20 SOLUTION/ DROPS OPHTHALMIC at 23:35

## 2025-05-30 RX ADMIN — HALOPERIDOL 5 MILLIGRAM(S): 10 TABLET ORAL at 11:58

## 2025-05-30 RX ADMIN — HALOPERIDOL 1 MILLIGRAM(S): 10 TABLET ORAL at 18:49

## 2025-05-30 RX ADMIN — LATANOPROST PF 1 DROP(S): 0.05 SOLUTION/ DROPS OPHTHALMIC at 23:35

## 2025-05-30 RX ADMIN — TIMOLOL MALEATE 1 DROP(S): 6.8 SOLUTION OPHTHALMIC at 18:51

## 2025-05-30 RX ADMIN — Medication 0.5 MILLIGRAM(S): at 19:09

## 2025-05-30 RX ADMIN — ACETAZOLAMIDE 250 MILLIGRAM(S): 250 TABLET ORAL at 18:48

## 2025-05-30 RX ADMIN — AMLODIPINE BESYLATE 10 MILLIGRAM(S): 10 TABLET ORAL at 02:24

## 2025-05-30 RX ADMIN — Medication 2 MILLIGRAM(S): at 13:32

## 2025-05-30 RX ADMIN — HALOPERIDOL 5 MILLIGRAM(S): 10 TABLET ORAL at 13:32

## 2025-05-30 NOTE — ED BEHAVIORAL HEALTH PROGRESS NOTE - NSBHATTESTAPPBILLTIME_PSY_A_CORE
I attest my time as CHELSEA is greater than 50% of the total combined time spent on qualifying patient care activities. I have reviewed and verified the documentation.

## 2025-05-30 NOTE — CONSULT NOTE ADULT - SUBJECTIVE AND OBJECTIVE BOX
History of present illness: Patient is a 64-year-old female with past medical history significant for Schizoaffective bipolar disorder with prior inpatient psychiatric hospitalization, hypertension, CKD 3 was admitted with disorganized behavior.  She was seen by behavioral health and plan is to admit her to inpatient psychiatry unit.  Labs in ER significant for serum creatinine of 2.78 on admission for which nephrology consultation was sought.  Patient seen in ER, sitter at bedside.  Patient is not providing any meaningful history.    Review of system: Unable to obtain secondary to patient current clinical condition.  Past medical and surgical history: Hypertension, bipolar disorder, glaucoma, CKD stage III, cataract  Allergies: NKDA  Family history: No pertinent family history of renal disease or electrolyte disorder in first degree relatives.  Social history: Lives with  no history of drug/tobacco abuse.  Home Medications:  Haldol Decanoate 50 mg/mL intramuscular solution: 100 milligram(s) intramuscular every 4 weeks  Next due on 11/13/2022 (08 Nov 2022 11:32)    MEDICATIONS  (STANDING):  acetaZOLAMIDE    Tablet 250 milliGRAM(s) Oral two times a day  amLODIPine   Tablet 10 milliGRAM(s) Oral daily  benztropine 0.5 milliGRAM(s) Oral two times a day  buPROPion XL (24-Hour) . 150 milliGRAM(s) Oral daily  dorzolamide 2% Ophthalmic Solution 1 Drop(s) Right EYE three times a day  haloperidol     Tablet 1 milliGRAM(s) Oral two times a day  latanoprost 0.005% Ophthalmic Solution 1 Drop(s) Both EYES at bedtime  losartan 100 milliGRAM(s) Oral daily  timolol 0.5% Solution 1 Drop(s) Both EYES two times a day    MEDICATIONS  (PRN):    Vital Signs Last 24 Hrs  T(C): 37.1 (30 May 2025 19:13), Max: 37.1 (30 May 2025 02:08)  T(F): 98.7 (30 May 2025 19:13), Max: 98.8 (30 May 2025 02:08)  HR: 73 (30 May 2025 19:13) (58 - 73)  BP: 178/109 (30 May 2025 19:13) (158/76 - 217/81)  BP(mean): 132 (30 May 2025 19:13) (108 - 132)  RR: 18 (30 May 2025 19:13) (16 - 20)  SpO2: 98% (30 May 2025 19:13) (95% - 99%)  Parameters below as of 30 May 2025 19:13  Patient On (Oxygen Delivery Method): room air      Physical Exam:  Gen: no acute distress  MS: Alert, labile mood, not answering questions appropriately  HENT: NCAT, MMM  CV: S1-S2 normal, no LE edema  Chest: CTAB, no w/r/r,  Abd: soft, NT, ND  Skin: dry, warm, no rash or jaundice    05-30    143  |  111[H]  |  22.2[H]  ----------------------------<  63[L]  4.1   |  20.0[L]  |  1.72[H]    Ca    8.3[L]      30 May 2025 14:30    TPro  8.4  /  Alb  4.0  /  TBili  0.5  /  DBili  x   /  AST  39[H]  /  ALT  21  /  AlkPhos  62  05-29                        12.6   2.85  )-----------( 183      ( 29 May 2025 16:37 )             37.3     Imaging: Reviewed
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
## 2025-05-30 NOTE — CONSULT NOTE ADULT - ASSESSMENT
64 YOF Schizoaffective bipolar disorder and prior inpatient psychiatric hospitalization admitted for relapse.  Awaiting inpatient psychiatric admission.  Nephrology consulted for acute kidney injury.    Acute kidney injury on chronic kidney disease stage III: Baseline serum creatinine of 1.5-1.7 mg/deciliter.  It was elevated to 2.78 mg/deciliter on arrival, better back to baseline today of 1.72 mg/deciliter.  Urinalysis was bland.  Ultrasound of kidney unremarkable without any hydronephrosis.  LILIA was likely prerenal improving back to baseline now.     Hypertension: Controlled, continue losartan and amlodipine.  Schizoaffective bipolar disorder: Management per behavioral health.  Patient is stable from nephrology standpoint for inpatient admission.

## 2025-05-30 NOTE — ED BEHAVIORAL HEALTH NOTE - BEHAVIORAL HEALTH NOTE
SW Note: Telepsych reported plan is to transfer pt for IPP care when medically cleared. Spoke with Dr. Webster and pt is pending nephrology consult but she plans to repeat labs while waiting for eval. SW will follow and assist with transfer plans when medically cleared

## 2025-05-31 ENCOUNTER — INPATIENT (INPATIENT)
Facility: HOSPITAL | Age: 65
LOS: 12 days | Discharge: HOME | End: 2025-06-13
Attending: STUDENT IN AN ORGANIZED HEALTH CARE EDUCATION/TRAINING PROGRAM | Admitting: STUDENT IN AN ORGANIZED HEALTH CARE EDUCATION/TRAINING PROGRAM
Payer: MEDICARE

## 2025-05-31 VITALS — HEIGHT: 65 IN | OXYGEN SATURATION: 100 % | WEIGHT: 141.98 LBS | TEMPERATURE: 98 F

## 2025-05-31 VITALS
TEMPERATURE: 98 F | SYSTOLIC BLOOD PRESSURE: 149 MMHG | RESPIRATION RATE: 18 BRPM | DIASTOLIC BLOOD PRESSURE: 88 MMHG | HEART RATE: 77 BPM | OXYGEN SATURATION: 97 %

## 2025-05-31 DIAGNOSIS — H26.9 UNSPECIFIED CATARACT: Chronic | ICD-10-CM

## 2025-05-31 DIAGNOSIS — F32.9 MAJOR DEPRESSIVE DISORDER, SINGLE EPISODE, UNSPECIFIED: ICD-10-CM

## 2025-05-31 LAB
ANION GAP SERPL CALC-SCNC: 12 MMOL/L — SIGNIFICANT CHANGE UP (ref 5–17)
BUN SERPL-MCNC: 17.3 MG/DL — SIGNIFICANT CHANGE UP (ref 8–20)
CALCIUM SERPL-MCNC: 8.5 MG/DL — SIGNIFICANT CHANGE UP (ref 8.4–10.5)
CHLORIDE SERPL-SCNC: 110 MMOL/L — HIGH (ref 96–108)
CO2 SERPL-SCNC: 20 MMOL/L — LOW (ref 22–29)
CREAT SERPL-MCNC: 1.54 MG/DL — HIGH (ref 0.5–1.3)
EGFR: 38 ML/MIN/1.73M2 — LOW
EGFR: 38 ML/MIN/1.73M2 — LOW
GLUCOSE SERPL-MCNC: 68 MG/DL — LOW (ref 70–99)
POTASSIUM SERPL-MCNC: 4.9 MMOL/L — SIGNIFICANT CHANGE UP (ref 3.5–5.3)
POTASSIUM SERPL-SCNC: 4.9 MMOL/L — SIGNIFICANT CHANGE UP (ref 3.5–5.3)
SODIUM SERPL-SCNC: 142 MMOL/L — SIGNIFICANT CHANGE UP (ref 135–145)

## 2025-05-31 PROCEDURE — G0378: CPT

## 2025-05-31 PROCEDURE — 80307 DRUG TEST PRSMV CHEM ANLYZR: CPT

## 2025-05-31 PROCEDURE — 93005 ELECTROCARDIOGRAM TRACING: CPT

## 2025-05-31 PROCEDURE — 80053 COMPREHEN METABOLIC PANEL: CPT

## 2025-05-31 PROCEDURE — 0225U NFCT DS DNA&RNA 21 SARSCOV2: CPT

## 2025-05-31 PROCEDURE — 99285 EMERGENCY DEPT VISIT HI MDM: CPT | Mod: 25

## 2025-05-31 PROCEDURE — 83930 ASSAY OF BLOOD OSMOLALITY: CPT

## 2025-05-31 PROCEDURE — 99233 SBSQ HOSP IP/OBS HIGH 50: CPT

## 2025-05-31 PROCEDURE — 80178 ASSAY OF LITHIUM: CPT

## 2025-05-31 PROCEDURE — 81003 URINALYSIS AUTO W/O SCOPE: CPT

## 2025-05-31 PROCEDURE — 76775 US EXAM ABDO BACK WALL LIM: CPT

## 2025-05-31 PROCEDURE — 36415 COLL VENOUS BLD VENIPUNCTURE: CPT

## 2025-05-31 PROCEDURE — 80164 ASSAY DIPROPYLACETIC ACD TOT: CPT

## 2025-05-31 PROCEDURE — 96372 THER/PROPH/DIAG INJ SC/IM: CPT

## 2025-05-31 PROCEDURE — 85027 COMPLETE CBC AUTOMATED: CPT

## 2025-05-31 PROCEDURE — 80048 BASIC METABOLIC PNL TOTAL CA: CPT

## 2025-05-31 RX ORDER — DORZOLAMIDE 20 MG/ML
1 SOLUTION/ DROPS OPHTHALMIC THREE TIMES A DAY
Refills: 0 | Status: DISCONTINUED | OUTPATIENT
Start: 2025-05-31 | End: 2025-06-13

## 2025-05-31 RX ORDER — TIMOLOL MALEATE 6.8 MG/ML
1 SOLUTION OPHTHALMIC
Refills: 0 | Status: DISCONTINUED | OUTPATIENT
Start: 2025-05-31 | End: 2025-06-13

## 2025-05-31 RX ORDER — BENZTROPINE MESYLATE 2 MG
0.5 TABLET ORAL
Refills: 0 | Status: DISCONTINUED | OUTPATIENT
Start: 2025-05-31 | End: 2025-06-13

## 2025-05-31 RX ORDER — LORAZEPAM 4 MG/ML
2 VIAL (ML) INJECTION EVERY 6 HOURS
Refills: 0 | Status: DISCONTINUED | OUTPATIENT
Start: 2025-05-31 | End: 2025-06-04

## 2025-05-31 RX ORDER — LOSARTAN POTASSIUM 100 MG/1
100 TABLET, FILM COATED ORAL DAILY
Refills: 0 | Status: DISCONTINUED | OUTPATIENT
Start: 2025-05-31 | End: 2025-06-13

## 2025-05-31 RX ORDER — BUPROPION HYDROBROMIDE 522 MG/1
150 TABLET, EXTENDED RELEASE ORAL DAILY
Refills: 0 | Status: DISCONTINUED | OUTPATIENT
Start: 2025-05-31 | End: 2025-06-02

## 2025-05-31 RX ORDER — HALOPERIDOL 10 MG/1
1 TABLET ORAL
Refills: 0 | Status: DISCONTINUED | OUTPATIENT
Start: 2025-05-31 | End: 2025-06-03

## 2025-05-31 RX ORDER — AMLODIPINE BESYLATE 10 MG/1
10 TABLET ORAL DAILY
Refills: 0 | Status: DISCONTINUED | OUTPATIENT
Start: 2025-05-31 | End: 2025-06-13

## 2025-05-31 RX ORDER — LORAZEPAM 4 MG/ML
2 VIAL (ML) INJECTION ONCE
Refills: 0 | Status: DISCONTINUED | OUTPATIENT
Start: 2025-05-31 | End: 2025-06-04

## 2025-05-31 RX ORDER — HALOPERIDOL 10 MG/1
5 TABLET ORAL EVERY 6 HOURS
Refills: 0 | Status: DISCONTINUED | OUTPATIENT
Start: 2025-05-31 | End: 2025-06-13

## 2025-05-31 RX ORDER — ACETAZOLAMIDE 250 MG/1
250 TABLET ORAL
Refills: 0 | Status: DISCONTINUED | OUTPATIENT
Start: 2025-05-31 | End: 2025-06-13

## 2025-05-31 RX ORDER — LATANOPROST PF 0.05 MG/ML
1 SOLUTION/ DROPS OPHTHALMIC AT BEDTIME
Refills: 0 | Status: DISCONTINUED | OUTPATIENT
Start: 2025-05-31 | End: 2025-06-13

## 2025-05-31 RX ORDER — HALOPERIDOL 10 MG/1
5 TABLET ORAL ONCE
Refills: 0 | Status: DISCONTINUED | OUTPATIENT
Start: 2025-05-31 | End: 2025-06-13

## 2025-05-31 RX ADMIN — HALOPERIDOL 1 MILLIGRAM(S): 10 TABLET ORAL at 05:21

## 2025-05-31 RX ADMIN — DORZOLAMIDE 1 DROP(S): 20 SOLUTION/ DROPS OPHTHALMIC at 14:52

## 2025-05-31 RX ADMIN — Medication 0.5 MILLIGRAM(S): at 05:22

## 2025-05-31 RX ADMIN — Medication 0.5 MILLIGRAM(S): at 20:51

## 2025-05-31 RX ADMIN — AMLODIPINE BESYLATE 10 MILLIGRAM(S): 10 TABLET ORAL at 05:59

## 2025-05-31 RX ADMIN — LOSARTAN POTASSIUM 100 MILLIGRAM(S): 100 TABLET, FILM COATED ORAL at 05:21

## 2025-05-31 RX ADMIN — ACETAZOLAMIDE 250 MILLIGRAM(S): 250 TABLET ORAL at 20:51

## 2025-05-31 RX ADMIN — BUPROPION HYDROBROMIDE 150 MILLIGRAM(S): 522 TABLET, EXTENDED RELEASE ORAL at 12:10

## 2025-05-31 RX ADMIN — HALOPERIDOL 1 MILLIGRAM(S): 10 TABLET ORAL at 20:52

## 2025-05-31 RX ADMIN — ACETAZOLAMIDE 250 MILLIGRAM(S): 250 TABLET ORAL at 05:22

## 2025-05-31 NOTE — BH PATIENT PROFILE - HOME MEDICATIONS
haloperidol 1 mg oral tablet , 1 tab(s) orally 2 times a day x 30 days   amLODIPine 10 mg oral tablet , 1 tab(s) orally once a day x 30 days   Haldol Decanoate 50 mg/mL intramuscular solution , 100 milligram(s) intramuscular every 4 weeks  Next due on 11/13/2022  benztropine 0.5 mg oral tablet , 1 tab(s) orally 2 times a day x 30 days

## 2025-05-31 NOTE — ED CDU PROVIDER SUBSEQUENT DAY NOTE - CLINICAL SUMMARY MEDICAL DECISION MAKING FREE TEXT BOX
labs and ECG results reviewed. psychiatry assessment noted. nephrology consulted for AM. will place in obs. awaiting psych inpt.
Plan for involuntary psychiatric admission. Pt had LILIA, improving, per nephrology consult likely prerenal.

## 2025-05-31 NOTE — CHART NOTE - NSCHARTNOTEFT_GEN_A_CORE
ED Attending Dr. Muniz:  Reviewed ECG, patient with sinus rhythm, not tachycardic; evidence of LVH (left ventricular hypertrophy) with repolarization changes. These findings were also present on ECG from 2022 and do not represent a change in baseline.  This not an acute finding and does not preclude in patient psychiatric treatment.   Patient remains medically cleared for inpatient psychiatric care and treatment.

## 2025-05-31 NOTE — ED CDU PROVIDER DISPOSITION NOTE - CLINICAL COURSE
medical cleared; psychiatrically accepted for inpatient care for decompensated schizoaffective disorder "I'm okay" "I 'want to stab myself with knife "

## 2025-05-31 NOTE — ED BEHAVIORAL HEALTH PROGRESS NOTE - RISK ASSESSMENT
rf:  mental health condition(s)  stressors    pf:  connected to care  family support  domiciled, educated
rf:  mental health condition(s)  stressors    pf:  connected to care  family support  domiciled, educated

## 2025-05-31 NOTE — ED BEHAVIORAL HEALTH PROGRESS NOTE - NSBHMSEREMMEM_PSY_A_CORE
Patient up to bathroom with RN assistance. Sarina-care taught and completed. Questions encouraged and answered. Patient ambulating without difficulty, encouraged to call for needs or concerns. Verbalizes understanding.
Unable to assess
Unable to assess

## 2025-05-31 NOTE — ED BEHAVIORAL HEALTH NOTE - BEHAVIORAL HEALTH NOTE
SHAZIA Note: SW received report from telepsych that pt was accepted to [facility] for IP MH treatment, accepting MD: Gabo Martino.   Pt was informed of acceptance, [facility] letter provided. Pt going involuntary and legals were completed. SW informed RN to completed the RN to RN with unit  2 [facility] [phone].  SW arranged for an ambulance via [facility] for a next available . RN informed to give report to [facility].   SW completed transfer packet. ED provider Flavio, aware of the transfer and provided with Certificate of transfer paperwork to be completed.   SW to have RN  the Certificate of [facility] when completed and [facility]   Pt has [employer] Medicare insurance and will require auth. SHAZIA to obtain auth when appropriate.
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
## 2025-05-31 NOTE — ED BEHAVIORAL HEALTH PROGRESS NOTE - SUMMARY
64 year old woman  domiciled unemployed pph schizoaffective bipolar prior inpatient psychiatric hospitalization outpatient mental health follow up (through fsl act w prior aot order) no prior suicide attempts or non suicidal self injurious behavior no instances of violence aggression violation of orders of protection, pmh breast cancer chronic kidney disease glaucoma hypertension who was brought in by ems activated by  for psychiatric evaluation.  h/o schizoaffective bipolar. despite reported compliance with psychotropic medication regimen (per ) presenting with symptoms/narrative suggestive of psychotic decompensation. awaiting medical clearance. however when medically cleared will likely benefit from inpatient psychiatric hospitalization. Would benefit from inpatient psychiatric admission for assurance of safety, further stabilization, medication management, and diagnostic clarification.
64 year old woman  domiciled unemployed pph schizoaffective bipolar prior inpatient psychiatric hospitalization outpatient mental health follow up (through fsl act w prior aot order) no prior suicide attempts or non suicidal self injurious behavior no instances of violence aggression violation of orders of protection, pmh breast cancer chronic kidney disease glaucoma hypertension who was brought in by ems activated by  for psychiatric evaluation.  h/o schizoaffective bipolar. despite reported compliance with psychotropic medication regimen (per ) presenting with symptoms/narrative suggestive of psychotic decompensation. awaiting medical clearance. however when medically cleared will likely benefit from inpatient psychiatric hospitalization. Would benefit from inpatient psychiatric admission for assurance of safety, further stabilization, medication management, and diagnostic clarification.

## 2025-05-31 NOTE — ED BEHAVIORAL HEALTH PROGRESS NOTE - OTHER
Detail Level: Simple Detail Level: Zone Detail Level: Generalized Patient Specific Counseling (Will Not Stick From Patient To Patient): She opts not to treat today around neck and under left arm. tbd denies limited deferred

## 2025-05-31 NOTE — ED CDU PROVIDER SUBSEQUENT DAY NOTE - HISTORY
Katherine: No events overnight. Creatinine yesterday downtrending, Am labs pending.
no event sovernight

## 2025-05-31 NOTE — ED BEHAVIORAL HEALTH PROGRESS NOTE - NEEDS CONTINUED MEDICAL WORKUP/TREATMENT DETAILS FREE TEXT
nephro consult, [facility]ox - now medically cleared 
nephro consult, [facility],

## 2025-05-31 NOTE — ED BEHAVIORAL HEALTH PROGRESS NOTE - CASE SUMMARY/FORMULATION (CLEARLY DOCUMENT RATIONALE FOR DISPOSITION CHANGE)
64 year old woman  domiciled unemployed pph schizoaffective bipolar prior inpatient psychiatric hospitalization outpatient mental health follow up (through fsl act w prior aot order) no prior suicide attempts or non suicidal self injurious behavior no instances of violence aggression violation of orders of protection, pmh breast cancer chronic kidney disease glaucoma hypertension who was brought in by ems activated by  for psychiatric evaluation.    Attempted eval this morning however patient was somnolent, arousable to verbal stimuli.  Pt wakes to questions but unable to stay alert.  Pt knows she is in the hospital however her thinking is still disorganized, her mood is irritable. Pt uncooperative. Denies AVH, SI/HI.    Given ongoing disorganized thought process, along with collateral information obtained from [facility] and  in addition to patient's overall presentation of irritability and combativeness in the ED, patient warrants and requires inpatient level of psychiatric care at this point and time for decompensation of schizoaffective disorder. Nephrology consulted who evaluated pt yesterday evening, now medically cleared for inpatient psychiatric admission.     
Attempted eval however just previously medicated for agitation with Haldol 5 IM and Versed 2 IM at approx 12 noon.  Pt wakes to questions but unable to stay alert.  Pt knows she is in the hospital however her thinking is disorganized, her mood is irritable and is paranoid.  Pt uncooperative with blood draws and IV insertion for fluids.  Pt c/o staff trying to kill her and is guarded and uncooperative.  Pt tried to walk around ED looking for her  and at times tried to hit 1 to 1 who was following her as she wandered.  Denies AVH, SI/HI.  Med list confirmed with  [phone] and ophthalmologist office Dr Unger, [phone].  Attempted contact and left several messages with ACT team [phone] and awaiting return call for haldol Dec due date.  Other meds:  acetaZOLAMIDE    Tablet 250 milliGRAM(s) Oral two times a day  amLODIPine   Tablet 10 milliGRAM(s) Oral daily  benztropine 0.5 milliGRAM(s) Oral two times a day  buPROPion XL (24-Hour) . 150 milliGRAM(s) Oral daily  dorzolamide 2% Ophthalmic Solution 1 Drop(s) Right EYE three times a day  haloperidol     Tablet 1 milliGRAM(s) Oral two times a day  latanoprost 0.005% Ophthalmic Solution 1 Drop(s) Both EYES at bedtime  losartan 100 milliGRAM(s) Oral daily  timolol 0.5% Solution 1 Drop(s) Both EYES two times a day    hold for med clearance  Haldol dec 100 mg q4 weeks  need date of last and next dose.  Transfer to in pt psych on [facility] once medically cleared.  Continue meds as above  Haldol 5 mg PO/IM, Ativan 2 mg PO/IM, PRN Q6H for agitation

## 2025-05-31 NOTE — ED BEHAVIORAL HEALTH PROGRESS NOTE - AXIS III
breast cancer chronic kidney disease glaucoma hypertension
breast cancer chronic kidney disease glaucoma hypertension

## 2025-05-31 NOTE — ED ADULT NURSE REASSESSMENT NOTE - NS ED NURSE REASSESS COMMENT FT1
Assumed care of patient from CONOR Payan and patient on constant observation for agitation, pt calm and cooperative at this time.
Assumed care of pt at 0715 from Stephanie PERDOMO. Pt sitting up in bed with HOB elevated as tolerated. Pt denies any pain/ discomfort. Pt denies chest pain./sob, respirations are even and unlabored. Pt awaiting nephrology. 1:1 at bedside. Yellow gown in place. env clean of clutter.  NAD.
Pt received from RN DC. Pt resting comfortably. 1:1 aide at the bedside. Safety measures maintained.
Report given to [facility] - [address] [place] [address] - [phone] - Romy COHEN RN.
assumed care of pt at 0720. report received from axel shields. charting as noted. rr even and unlabored. iv intact. 1;1 at bedside to maintain pt safety. denies si/hi/auditory or visual hallucinations. continued yellow gown in place. denies complaints at this time. awaiting nephrology consult and urine sample. pt educated on plan of care, pt able to successfully teach back plan of care to RN, RN will continue to reeducate pt during hospital stay.
patient calm and cooperative, pt medicated per order and safety precautions in place.
patient calm and cooperative, pt remains on safety precautions and pending inpatient psych placement. pt denies pain or needs at this time.
patient sleeping at this time, lights dimmed for comfort and safety precautions continued.
patient unsteady on her gait and Charge Nurse was made aware.
report given to  CONOR Engle. pt escorted to  with security and 1;1. RN made aware of transfer of care. pt educated on plan of care, pt able to successfully teach back plan of care to RN, RN will continue to reeducate pt during hospital stay.
Received pt from main ED. A&O x 3 hx of schizoaffective disorder presents for psych eval. Pt states "there's a man who looks like my , but that's not him." Pt tearful but cooperative and following directions. No SI/HI. Pt in yellow gown and nonskid socks. Frequent checks made.

## 2025-05-31 NOTE — ED BEHAVIORAL HEALTH PROGRESS NOTE - STANDING MEDS RECEIVED IN ED DETAILS FREE TEXT
acetaZOLAMIDE    Tablet 250 milliGRAM(s) Oral two times a day  amLODIPine   Tablet 10 milliGRAM(s) Oral daily  benztropine 0.5 milliGRAM(s) Oral two times a day  buPROPion XL (24-Hour) . 150 milliGRAM(s) Oral daily  dorzolamide 2% Ophthalmic Solution 1 Drop(s) Right EYE three times a day  haloperidol     Tablet 1 milliGRAM(s) Oral two times a day  latanoprost 0.005% Ophthalmic Solution 1 Drop(s) Both EYES at bedtime  losartan 100 milliGRAM(s) Oral daily  timolol 0.5% Solution 1 Drop(s) Both EYES two times a day  
   acetaZOLAMIDE    Tablet 250 milliGRAM(s) Oral two times a day  amLODIPine   Tablet 10 milliGRAM(s) Oral daily  benztropine 0.5 milliGRAM(s) Oral two times a day  buPROPion XL (24-Hour) . 150 milliGRAM(s) Oral daily  dorzolamide 2% Ophthalmic Solution 1 Drop(s) Right EYE three times a day  haloperidol     Tablet 1 milliGRAM(s) Oral two times a day  latanoprost 0.005% Ophthalmic Solution 1 Drop(s) Both EYES at bedtime  losartan 100 milliGRAM(s) Oral daily  timolol 0.5% Solution 1 Drop(s) Both EYES two times a day

## 2025-05-31 NOTE — ED BEHAVIORAL HEALTH PROGRESS NOTE - NSRISKVIOL_PSY_A_CORE
Unable to determine level of potential violence risk
Unable to determine level of potential violence risk

## 2025-05-31 NOTE — CHART NOTE - NSCHARTNOTEFT_GEN_A_CORE
Screening Medical Evaluation    Patient Admitted from: [facility] ED.     [facility] admitting diagnosis: Major depressive disorder with single episode      PAST MEDICAL & SURGICAL HISTORY:  HTN (hypertension)      Bipolar disorder      Glaucoma      CKD (chronic kidney disease)      Bilateral cataracts            Allergies    No Known Allergies    Intolerances          Social History:       FAMILY HISTORY:  FHx: breast cancer (Sibling)          MEDICATIONS  (STANDING):  acetaZOLAMIDE    Tablet 250 milliGRAM(s) Oral two times a day  amLODIPine   Tablet 10 milliGRAM(s) Oral daily  benztropine 0.5 milliGRAM(s) Oral two times a day  buPROPion XL (24-Hour) 150 milliGRAM(s) Oral daily  dorzolamide 2% Ophthalmic Solution 1 Drop(s) Right EYE three times a day  haloperidol     Tablet 1 milliGRAM(s) Oral two times a day  haloperidol    Injectable 5 milliGRAM(s) IntraMuscular once  latanoprost 0.005% Ophthalmic Solution 1 Drop(s) Both EYES at bedtime  LORazepam   Injectable 2 milliGRAM(s) IntraMuscular once  losartan 100 milliGRAM(s) Oral daily  timolol 0.5% Solution 1 Drop(s) Both EYES two times a day    MEDICATIONS  (PRN):  haloperidol     Tablet 5 milliGRAM(s) Oral every 6 hours PRN agitation 2/2 psychosis  LORazepam     Tablet 2 milliGRAM(s) Oral every 6 hours PRN agitation 2/2 psychosis        Vital Signs Last 24 Hrs  T(C): 36.9 (31 May 2025 17:41), Max: 36.9 (31 May 2025 17:41)  T(F): 98.4 (31 May 2025 17:41), Max: 98.4 (31 May 2025 17:41)  HR: 77 (31 May 2025 15:42) (56 - 77)  BP: 149/88 (31 May 2025 15:42) (139/82 - 180/90)  BP(mean): --  RR: 18 (31 May 2025 15:42) (18 - 18)  SpO2: 100% (31 May 2025 17:41) (95% - 100%)      CAPILLARY BLOOD GLUCOSE            PHYSICAL EXAM:  GENERAL: NAD.  HEAD:  Atraumatic, Normocephalic  EYES: EOMI, PERRLA, conjunctiva and sclera clear  NECK: Supple, No JVD  CHEST/LUNG: Clear to auscultation bilaterally; No wheeze  HEART: Regular rate and rhythm; No murmurs, rubs, or gallops  ABDOMEN: Positive BS, Soft, Nontender.   EXTREMITIES:  2+ Peripheral Pulses, No clubbing, cyanosis, or edema  PSYCH: Cooprative  NEUROLOGY: non-focal  SKIN: No rashes or lesions seen on exposed skin,     LABS:    05-31    142  |  110[H]  |  17.3  ----------------------------<  68[L]  4.9   |  20.0[L]  |  1.54[H]    Ca    8.5      31 May 2025 04:30            Urinalysis Basic - ( 31 May 2025 04:30 )    Color: x / Appearance: x / SG: x / pH: x  Gluc: 68 mg/dL / Ketone: x  / Bili: x / Urobili: x   Blood: x / Protein: x / Nitrite: x   Leuk Esterase: x / RBC: x / WBC x   Sq Epi: x / Non Sq Epi: x / Bacteria: x        RADIOLOGY & ADDITIONAL TESTS:      Assessment and Plan:  64F admitted to [facility] for Major depressive disorder with single episode.  PMHx of HTN, Glaucoma.  Pt seen for medical screening evaluation. Patient has no acute complaints at this time. Patient denies fever, chills, headache, dizziness, lightheadedness, N/V, SOB, cough, congestion, chest pain, abdominal pain, dysuria, hematuria, diarrhea, constipation. Physical exam unremarkable, VSS. Labs above. 3/29 EKG SB @ 56/ min, TWI 1,2, AVL, AVF, V3-> V6, LVH, QT/ Qtc= 476/ 459.     1.) HTN: DASH diet, continue BP meds.  2.) Glaucoma: Continue eye drops.  3.) Major depressive disorder with single episode: Plan per primary team.

## 2025-05-31 NOTE — ED ADULT NURSE REASSESSMENT NOTE - NSFALLUNIVINTERV_ED_ALL_ED
Monitor gait and stability/Bed/Stretcher in lowest position, wheels locked, appropriate side rails in place/Call bell, personal items and telephone in reach/Instruct patient to call for assistance before getting out of bed/chair/stretcher/Non-slip footwear applied when patient is off stretcher/[place] to call system/Physically safe environment - no spills, clutter or unnecessary equipment/Purposeful proactive rounding/Room/bathroom lighting operational, light cord in reach

## 2025-05-31 NOTE — ED BEHAVIORAL HEALTH PROGRESS NOTE - PRN MEDS RECIEVED IN ED DETAILS FREE TEXT
Haldol 5 IM,  Versed 2 mg IM 1158, Ativan 2 IM 5/29
Haldol 5 IM,  Versed 2 mg IM 1158, Ativan 2 IM 5/29

## 2025-05-31 NOTE — ED BEHAVIORAL HEALTH PROGRESS NOTE - DETAILS:
Attempted eval however just previously medicated for agitation with Haldol 5 IM and Versed 2 IM at approx 12 noon.  Pt wakes to questions but unable to stay alert.  Pt knows she is in the hospital however her thinking is disorganized, her mood is irritable and is paranoid.  Pt uncooperative with blood draws and IV insertion for fluids.  Pt c/o staff trying to kill her and is guarded and uncooperative.  Pt tried to walk around ED looking for her  and at times tried to hit 1 to 1 who was following her as she wandered.  Denies AVH, SI/HI.  Med list confirmed with  [phone] and ophthalmologist office Dr Unger, [phone].  Attempted contact and left several messages with ACT team [phone] and awaiting return call for haldol Dec due date.  Other meds:  acetaZOLAMIDE    Tablet 250 milliGRAM(s) Oral two times a day  amLODIPine   Tablet 10 milliGRAM(s) Oral daily  benztropine 0.5 milliGRAM(s) Oral two times a day  buPROPion XL (24-Hour) . 150 milliGRAM(s) Oral daily  dorzolamide 2% Ophthalmic Solution 1 Drop(s) Right EYE three times a day  haloperidol     Tablet 1 milliGRAM(s) Oral two times a day  latanoprost 0.005% Ophthalmic Solution 1 Drop(s) Both EYES at bedtime  losartan 100 milliGRAM(s) Oral daily  timolol 0.5% Solution 1 Drop(s) Both EYES two times a day    
Attempted eval however patient was somnolent, arousable to verbal stimuli.  Pt wakes to questions but unable to stay alert.  Pt knows she is in the hospital however her thinking is still disorganized, her mood is irritable. Pt uncooperative. Denies AVH, SI/HI.    Med list confirmed during previous day with  [phone] and ophthalmologist office Dr Unger, [phone].  Attempted contact and left several messages with ACT team [phone] and awaiting return call for haldol Dec due date.  Other meds:  acetaZOLAMIDE    Tablet 250 milliGRAM(s) Oral two times a day  amLODIPine   Tablet 10 milliGRAM(s) Oral daily  benztropine 0.5 milliGRAM(s) Oral two times a day  buPROPion XL (24-Hour) . 150 milliGRAM(s) Oral daily  dorzolamide 2% Ophthalmic Solution 1 Drop(s) Right EYE three times a day  haloperidol     Tablet 1 milliGRAM(s) Oral two times a day  latanoprost 0.005% Ophthalmic Solution 1 Drop(s) Both EYES at bedtime  losartan 100 milliGRAM(s) Oral daily  timolol 0.5% Solution 1 Drop(s) Both EYES two times a day

## 2025-05-31 NOTE — ED CDU PROVIDER SUBSEQUENT DAY NOTE - CONSTITUTIONAL, MLM
Last OV relevant to medication: 10/7/17 cpx  Last refill date: 8/8/16     #/refills: 90/3  When pt was asked to return for OV: 1 yr  Upcoming appt/reason: none    Lab Results  Component Value Date   GLU 99 09/08/2016   BUN 14 09/08/2016   CREATSERUM 0.73 0 Well appearing, awake, alert, oriented to person, place, time/situation and in no apparent distress. normal...

## 2025-05-31 NOTE — ED BEHAVIORAL HEALTH PROGRESS NOTE - LACKING INFO FOR PSYCH DISPO DETAILS FREE TEXT
holding for med clearance for transfer to in Pt psych
held for med clearance for transfer to in Pt psych, now medically cleared with nephrology consulted and having assessed the patient

## 2025-05-31 NOTE — ED BEHAVIORAL HEALTH PROGRESS NOTE - PSYCHIATRIC ISSUES AND PLAN (INCLUDE STANDING AND PRN MEDICATION)
continue constant observation for safety; confirm psychotropic medication regimen (date and time of late, next Dose Haldol dec 100 mg q4 weeks
continue constant observation for safety; confirm psychotropic medication regimen (date and time of late, next Dose Haldol dec 100 mg q4 weeks

## 2025-06-01 DIAGNOSIS — H40.9 UNSPECIFIED GLAUCOMA: ICD-10-CM

## 2025-06-01 DIAGNOSIS — I10 ESSENTIAL (PRIMARY) HYPERTENSION: ICD-10-CM

## 2025-06-01 PROCEDURE — 99232 SBSQ HOSP IP/OBS MODERATE 35: CPT

## 2025-06-01 RX ORDER — ONDANSETRON HCL/PF 4 MG/2 ML
4 VIAL (ML) INJECTION ONCE
Refills: 0 | Status: COMPLETED | OUTPATIENT
Start: 2025-06-01 | End: 2025-06-01

## 2025-06-01 RX ORDER — ACETAMINOPHEN 500 MG/5ML
650 LIQUID (ML) ORAL ONCE
Refills: 0 | Status: COMPLETED | OUTPATIENT
Start: 2025-06-01 | End: 2025-06-01

## 2025-06-01 RX ADMIN — Medication 0.5 MILLIGRAM(S): at 08:03

## 2025-06-01 RX ADMIN — LOSARTAN POTASSIUM 100 MILLIGRAM(S): 100 TABLET, FILM COATED ORAL at 08:03

## 2025-06-01 RX ADMIN — Medication 650 MILLIGRAM(S): at 21:43

## 2025-06-01 RX ADMIN — HALOPERIDOL 1 MILLIGRAM(S): 10 TABLET ORAL at 08:02

## 2025-06-01 RX ADMIN — HALOPERIDOL 1 MILLIGRAM(S): 10 TABLET ORAL at 21:01

## 2025-06-01 RX ADMIN — Medication 0.5 MILLIGRAM(S): at 21:01

## 2025-06-01 RX ADMIN — Medication 4 MILLIGRAM(S): at 21:43

## 2025-06-01 NOTE — BH INPATIENT PSYCHIATRY ASSESSMENT NOTE - NSBHASSESSSUMMFT_PSY_ALL_CORE
66 yo F with schizoaffective disorder, prior hospitalizations, medical hx of CKD, breast CA, HTN admitted for psychotic decompensation.  History and current presentation suggestive of acute decompensation of psychosis and possible henrietta in setting of psychosocial stressors (as noted above) and poor compliance to treatment (VPA level low). Hospitalization indicated at this time. Treatment will include stabilization of symptoms, optimization of meds, coordination of outpatient services. No CO indicated at this time.     Standard OBS  Haldol 1mg BID  Defer VPA for now until we obtain weights / confirm with collateral  Continue Wellbutrin for now but consider discontinuing in setting of increased reported irritability out in the community  c/w medical medications as ordered  coordinate with hospitalist as needed  coordinate with s/w re. disposition 66 yo F with schizoaffective disorder, prior hospitalizations, medical hx of CKD, breast CA, HTN admitted for psychotic decompensation.  History and current presentation suggestive of acute decompensation of psychosis and possible henrietta in setting of psychosocial stressors (as noted above) and poor compliance to treatment (VPA level low). Hospitalization indicated at this time. Treatment will include stabilization of symptoms, optimization of meds, coordination of outpatient services. No CO indicated at this time.     Standard OBS  Haldol 1mg BID; haldol dec 100mg q28 days next due 6/9 (needs confirmation)   Defer VPA for now until we obtain weights / confirm with collateral  Continue Wellbutrin for now but consider discontinuing in setting of increased reported irritability out in the community  c/w medical medications as ordered  coordinate with hospitalist as needed  coordinate with s/w re. disposition

## 2025-06-01 NOTE — BH INPATIENT PSYCHIATRY ASSESSMENT NOTE - NSBHCHARTREVIEWVS_PSY_A_CORE FT
Vital Signs Last 24 Hrs  T(C): 36.5 (06-01-25 @ 06:34), Max: 36.9 (05-31-25 @ 17:41)  T(F): 97.7 (06-01-25 @ 06:34), Max: 98.4 (05-31-25 @ 17:41)  HR: 77 (05-31-25 @ 15:42) (73 - 77)  BP: 149/88 (05-31-25 @ 15:42) (139/82 - 149/88)  BP(mean): --  RR: 16 (06-01-25 @ 06:34) (16 - 18)  SpO2: 100% (05-31-25 @ 17:41) (95% - 100%)    Orthostatic VS  06-01-25 @ 06:34  Lying BP: --/-- HR: --  Sitting BP: 156/67 HR: 57  Standing BP: 147/89 HR: 66  Site: --  Mode: --  Orthostatic VS  05-31-25 @ 17:41  Lying BP: --/-- HR: --  Sitting BP: 139/65 HR: 66  Standing BP: 136/60 HR: 75  Site: --  Mode: --   Vital Signs Last 24 Hrs  T(C): 36.5 (06-01-25 @ 06:34), Max: 36.9 (05-31-25 @ 17:41)  T(F): 97.7 (06-01-25 @ 06:34), Max: 98.4 (05-31-25 @ 17:41)  HR: 77 (05-31-25 @ 15:42) (77 - 77)  BP: 149/88 (05-31-25 @ 15:42) (149/88 - 149/88)  BP(mean): --  RR: 16 (06-01-25 @ 06:34) (16 - 18)  SpO2: 100% (05-31-25 @ 17:41) (97% - 100%)    Orthostatic VS  06-01-25 @ 06:34  Lying BP: --/-- HR: --  Sitting BP: 156/67 HR: 57  Standing BP: 147/89 HR: 66  Site: --  Mode: --  Orthostatic VS  05-31-25 @ 17:41  Lying BP: --/-- HR: --  Sitting BP: 139/65 HR: 66  Standing BP: 136/60 HR: 75  Site: --  Mode: --

## 2025-06-01 NOTE — BH INPATIENT PSYCHIATRY ASSESSMENT NOTE - NSBHCONSDANGERSELF_PSY_A_CORE
Medication: metoPROLOL succinate (TOPROL-XL) 50 MG 24 hr tablet   losartan-hydrochlorothiazide (HYZAAR) 100-25 MG per tablet    passed protocol.   Last office visit date: 02/19/2024  Next appointment scheduled?: No; Return in about 1 year (around 2/19/2025) for Medicare Wellness Visit..    Number of refills given: 3 months with 3 refills    
unable to care for self

## 2025-06-01 NOTE — PSYCHIATRIC REHAB INITIAL EVALUATION - NSBHPRRECOMMEND_PSY_ALL_CORE
Writer attempted twice to meet with Pt to orient her to Psych Rehab department and its functions. Pt refused to speak with the writer on both attempts. Therefore, this report is based on Pt’s chart. Per the chart Pt is pph schizoaffective bipolar prior inpatient psychiatric hospitalization outpatient mental health follow up (through fsl act w prior aot order) no prior suicide attempts or non-suicidal self-injurious behavior no instances of violence aggression violation of orders of protection,  who was brought in by ems activated by  for psychiatric evaluation. Writer was unable to establish a collaborative goal with the Pt due to Pt’s refusal. Therefore, a goal was chosen for the Pt to work on the next seven days. Psychiatric Rehabilitation staff will continue to provide encouragement, support, psychotherapy, and psychoeducation to assist the Pt in the progression of her treatment goal and engagement with activities.

## 2025-06-01 NOTE — BH INPATIENT PSYCHIATRY ASSESSMENT NOTE - NSBHMETABOLIC_PSY_ALL_CORE_FT
BMI: BMI (kg/m2): 23.6 (05-31-25 @ 17:41)  HbA1c:   Glucose:   BP: --Vital Signs Last 24 Hrs  T(C): 36.5 (06-01-25 @ 06:34), Max: 36.9 (05-31-25 @ 17:41)  T(F): 97.7 (06-01-25 @ 06:34), Max: 98.4 (05-31-25 @ 17:41)  HR: 77 (05-31-25 @ 15:42) (73 - 77)  BP: 149/88 (05-31-25 @ 15:42) (139/82 - 149/88)  BP(mean): --  RR: 16 (06-01-25 @ 06:34) (16 - 18)  SpO2: 100% (05-31-25 @ 17:41) (95% - 100%)    Orthostatic VS  06-01-25 @ 06:34  Lying BP: --/-- HR: --  Sitting BP: 156/67 HR: 57  Standing BP: 147/89 HR: 66  Site: --  Mode: --  Orthostatic VS  05-31-25 @ 17:41  Lying BP: --/-- HR: --  Sitting BP: 139/65 HR: 66  Standing BP: 136/60 HR: 75  Site: --  Mode: --    Lipid Panel:  BMI: BMI (kg/m2): 23.6 (05-31-25 @ 17:41)  HbA1c:   Glucose:   BP: --Vital Signs Last 24 Hrs  T(C): 36.5 (06-01-25 @ 06:34), Max: 36.9 (05-31-25 @ 17:41)  T(F): 97.7 (06-01-25 @ 06:34), Max: 98.4 (05-31-25 @ 17:41)  HR: 77 (05-31-25 @ 15:42) (77 - 77)  BP: 149/88 (05-31-25 @ 15:42) (149/88 - 149/88)  BP(mean): --  RR: 16 (06-01-25 @ 06:34) (16 - 18)  SpO2: 100% (05-31-25 @ 17:41) (97% - 100%)    Orthostatic VS  06-01-25 @ 06:34  Lying BP: --/-- HR: --  Sitting BP: 156/67 HR: 57  Standing BP: 147/89 HR: 66  Site: --  Mode: --  Orthostatic VS  05-31-25 @ 17:41  Lying BP: --/-- HR: --  Sitting BP: 139/65 HR: 66  Standing BP: 136/60 HR: 75  Site: --  Mode: --    Lipid Panel:

## 2025-06-01 NOTE — BH INPATIENT PSYCHIATRY ASSESSMENT NOTE - MSE UNSTRUCTURED FT
Appearance: Dressed appropriately.  Attitude / Behavior / relatedness: oddly-related  Eye contact: appropriate   Motor: No abnormal movements, no psychomotor slowing or activation.  Speech: Regular rate.  Mood: "fine"  Affect: neutral; mildly anxious   Thought Process: disorganized, illogical, with derailments   Thought Content: bizarre contents. no suicidal ideation or hi   Perceptual: denies current AVH   Insight: poor   Judgment: impaired  Impulse control:  fair   Cognition: grossly intact  Gait: Intact.
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
## 2025-06-01 NOTE — BH INPATIENT PSYCHIATRY ASSESSMENT NOTE - CURRENT MEDICATION
MEDICATIONS  (STANDING):  acetaZOLAMIDE    Tablet 250 milliGRAM(s) Oral two times a day  amLODIPine   Tablet 10 milliGRAM(s) Oral daily  benztropine 0.5 milliGRAM(s) Oral two times a day  buPROPion XL (24-Hour) 150 milliGRAM(s) Oral daily  dorzolamide 2% Ophthalmic Solution 1 Drop(s) Right EYE three times a day  haloperidol     Tablet 1 milliGRAM(s) Oral two times a day  haloperidol    Injectable 5 milliGRAM(s) IntraMuscular once  latanoprost 0.005% Ophthalmic Solution 1 Drop(s) Both EYES at bedtime  LORazepam   Injectable 2 milliGRAM(s) IntraMuscular once  losartan 100 milliGRAM(s) Oral daily  timolol 0.5% Solution 1 Drop(s) Both EYES two times a day    MEDICATIONS  (PRN):  haloperidol     Tablet 5 milliGRAM(s) Oral every 6 hours PRN agitation 2/2 psychosis  LORazepam     Tablet 2 milliGRAM(s) Oral every 6 hours PRN agitation 2/2 psychosis

## 2025-06-02 PROCEDURE — 99232 SBSQ HOSP IP/OBS MODERATE 35: CPT

## 2025-06-02 RX ADMIN — HALOPERIDOL 1 MILLIGRAM(S): 10 TABLET ORAL at 20:36

## 2025-06-02 RX ADMIN — ACETAZOLAMIDE 250 MILLIGRAM(S): 250 TABLET ORAL at 20:37

## 2025-06-02 RX ADMIN — Medication 0.5 MILLIGRAM(S): at 20:36

## 2025-06-02 NOTE — BH SOCIAL WORK INITIAL PSYCHOSOCIAL EVALUATION - OTHER PAST PSYCHIATRIC HISTORY (INCLUDE DETAILS REGARDING ONSET, COURSE OF ILLNESS, INPATIENT/OUTPATIENT TREATMENT)
According to ED Behavioral Health Assessment, "64 year old woman  domiciled unemployed pph schizoaffective bipolar prior inpatient psychiatric hospitalization outpatient mental health follow up (through fsl act w prior aot order) no prior suicide attempts or non suicidal self injurious behavior no instances of violence aggression violation of orders of protection, [facility] breast cancer chronic kidney disease glaucoma hypertension who was brought in by ems activated by  for psychiatric evaluation. Patient admitted due to concerns for psychotic decompensation and inability to care for herself."

## 2025-06-02 NOTE — BH INPATIENT PSYCHIATRY PROGRESS NOTE - MSE UNSTRUCTURED FT
Appearance: Dressed appropriately.  Attitude / Behavior / relatedness: oddly-related  Eye contact: appropriate   Motor: No abnormal movements, no psychomotor slowing or activation.  Speech: Regular rate.  Mood: "fine"  Affect: tense   Thought Process: disorganized, illogical, with derailments   Thought Content: bizarre contents. no suicidal ideation or hi   Perceptual: denies current AVH   Insight: poor   Judgment: impaired  Impulse control:  fair   Cognition: grossly intact  Gait: Intact.

## 2025-06-02 NOTE — BH INPATIENT PSYCHIATRY PROGRESS NOTE - NSBHFUPINTERVALHXFT_PSY_A_CORE
Patient declined all meds this morning. She is somewhat more intense today, with poor boundaries, tells me she is being discharged at 930am today. Her thoughts remain disorganized and difficult to follow. She denies AH.

## 2025-06-03 PROCEDURE — 99232 SBSQ HOSP IP/OBS MODERATE 35: CPT

## 2025-06-03 RX ORDER — HALOPERIDOL 10 MG/1
2 TABLET ORAL AT BEDTIME
Refills: 0 | Status: DISCONTINUED | OUTPATIENT
Start: 2025-06-03 | End: 2025-06-13

## 2025-06-03 RX ORDER — HALOPERIDOL 10 MG/1
1 TABLET ORAL DAILY
Refills: 0 | Status: DISCONTINUED | OUTPATIENT
Start: 2025-06-04 | End: 2025-06-04

## 2025-06-03 RX ADMIN — Medication 0.5 MILLIGRAM(S): at 08:40

## 2025-06-03 RX ADMIN — LOSARTAN POTASSIUM 100 MILLIGRAM(S): 100 TABLET, FILM COATED ORAL at 08:39

## 2025-06-03 RX ADMIN — Medication 0.5 MILLIGRAM(S): at 20:02

## 2025-06-03 RX ADMIN — HALOPERIDOL 2 MILLIGRAM(S): 10 TABLET ORAL at 20:03

## 2025-06-03 RX ADMIN — HALOPERIDOL 1 MILLIGRAM(S): 10 TABLET ORAL at 08:40

## 2025-06-03 NOTE — BH INPATIENT PSYCHIATRY PROGRESS NOTE - NSBHFUPINTERVALHXFT_PSY_A_CORE
Patient is disorganized, has impairments in reality testing, has poor boundaries. Yesterday, patient had urinated herself and was standing in her own urine. Thoughts are illogical. She tells me that I look Chinese and have a Chinese attitude. She declined labs and ekg this morning  Patient is disorganized, has impairments in reality testing, has poor boundaries. Yesterday, patient had urinated herself and was standing in her own urine. Thoughts are illogical. She tells me that I look Chinese and have a Chinese attitude. She declined labs and ekg this morning. took her standing psych medications and some HTN medications, declined glaucoma medications. she has no medical complaints at this time.

## 2025-06-03 NOTE — BH INPATIENT PSYCHIATRY PROGRESS NOTE - MSE UNSTRUCTURED FT
Appearance: disheveled appearing; order than stated age   Attitude / Behavior / relatedness: oddly-related  Eye contact: appropriate   Motor: No abnormal movements, no psychomotor slowing or activation.  Speech: Regular rate.  Mood: "fine"  Affect: bizarre   Thought Process: disorganized, illogical, with derailments   Thought Content: bizarre contents. no suicidal ideation or hi. discharge focused   Perceptual: denies current AVH   Insight: poor   Judgment: impaired  Impulse control:  fair   Cognition: grossly intact  Gait: Intact.  Appearance: disheveled appearing; older than stated age   Attitude / Behavior / relatedness: oddly-related  Eye contact: appropriate   Motor: No abnormal movements, no psychomotor slowing or activation.  Speech: Regular rate.  Mood: "fine"  Affect: bizarre   Thought Process: disorganized, illogical, with derailments   Thought Content: bizarre contents. no suicidal ideation or hi. discharge focused   Perceptual: denies current AVH   Insight: poor   Judgment: impaired  Impulse control:  fair   Cognition: grossly intact  Gait: Intact.

## 2025-06-04 RX ORDER — LORAZEPAM 4 MG/ML
2 VIAL (ML) INJECTION ONCE
Refills: 0 | Status: DISCONTINUED | OUTPATIENT
Start: 2025-06-04 | End: 2025-06-09

## 2025-06-04 RX ORDER — LORAZEPAM 4 MG/ML
2 VIAL (ML) INJECTION EVERY 6 HOURS
Refills: 0 | Status: DISCONTINUED | OUTPATIENT
Start: 2025-06-04 | End: 2025-06-09

## 2025-06-04 RX ORDER — HALOPERIDOL 10 MG/1
2 TABLET ORAL DAILY
Refills: 0 | Status: DISCONTINUED | OUTPATIENT
Start: 2025-06-05 | End: 2025-06-13

## 2025-06-04 RX ADMIN — LOSARTAN POTASSIUM 100 MILLIGRAM(S): 100 TABLET, FILM COATED ORAL at 08:54

## 2025-06-04 RX ADMIN — HALOPERIDOL 2 MILLIGRAM(S): 10 TABLET ORAL at 20:27

## 2025-06-04 RX ADMIN — Medication 0.5 MILLIGRAM(S): at 08:53

## 2025-06-04 RX ADMIN — Medication 0.5 MILLIGRAM(S): at 20:27

## 2025-06-04 RX ADMIN — HALOPERIDOL 1 MILLIGRAM(S): 10 TABLET ORAL at 08:53

## 2025-06-04 NOTE — BH INPATIENT PSYCHIATRY PROGRESS NOTE - MSE UNSTRUCTURED FT
Appearance: disheveled appearing; older than stated age   Attitude / Behavior / relatedness: at times with poor boundaries   Eye contact: appropriate   Motor: No abnormal movements, no psychomotor slowing or activation.  Speech: Regular rate.  Mood: "fine"  Affect: mildly irritable   Thought Process: concrete   Thought Content: discharge focused; denies SI   Perceptual: denies current [facility]   Insight: poor   Judgment: impaired  Impulse control:  fair   Cognition: grossly intact  Gait: Intact.

## 2025-06-04 NOTE — BH INPATIENT PSYCHIATRY PROGRESS NOTE - NSBHFUPINTERVALHXFT_PSY_A_CORE
Sleeping through the night. Patient remains quite discharge focused. She declined labs because she states her kidney is fine and she doesn’t need any more blood work. She denies AVH. she denies SI and HI. she is adherent to her standing psychiatric meds. Still awaiting callback from patient’s ACT team. She permits me to speak with her .

## 2025-06-05 RX ORDER — NICOTINE POLACRILEX 4 MG/1
2 GUM, CHEWING ORAL
Refills: 0 | Status: DISCONTINUED | OUTPATIENT
Start: 2025-06-05 | End: 2025-06-13

## 2025-06-05 RX ORDER — HALOPERIDOL 10 MG/1
100 TABLET ORAL ONCE
Refills: 0 | Status: COMPLETED | OUTPATIENT
Start: 2025-06-09 | End: 2025-06-09

## 2025-06-05 RX ADMIN — HALOPERIDOL 2 MILLIGRAM(S): 10 TABLET ORAL at 20:40

## 2025-06-05 RX ADMIN — LOSARTAN POTASSIUM 100 MILLIGRAM(S): 100 TABLET, FILM COATED ORAL at 08:20

## 2025-06-05 RX ADMIN — Medication 0.5 MILLIGRAM(S): at 20:40

## 2025-06-05 RX ADMIN — HALOPERIDOL 2 MILLIGRAM(S): 10 TABLET ORAL at 08:20

## 2025-06-05 RX ADMIN — Medication 0.5 MILLIGRAM(S): at 08:20

## 2025-06-05 NOTE — BH INPATIENT PSYCHIATRY PROGRESS NOTE - NSBHFUPINTERVALHXFT_PSY_A_CORE
No behavioral issues. Pt adherent to her psychiatric meds and some of her glaucoma and HTN meds, but declined others citing those aren’t her meds, and that the eye drops make things worse. She is initially tense, guarded, discharge focused. She loosens up a bit and expresses that she would like to be with her . She says a few things that odd including that she has 10 last names, and something about going back to school. Denies AVH.

## 2025-06-05 NOTE — BH INPATIENT PSYCHIATRY PROGRESS NOTE - MSE UNSTRUCTURED FT
Appearance: disheveled appearing; older than stated age   Attitude / Behavior / relatedness: at times with poor boundaries   Eye contact: appropriate   Motor: No abnormal movements, no psychomotor slowing or activation.  Speech: Regular rate.  Mood: "fine"  Affect: mildly irritable   Thought Process: illogical and with loose associations   Thought Content: discharge focused; denies SI   Perceptual: denies current AVH   Insight: poor   Judgment: impaired  Impulse control:  fair   Cognition: grossly intact  Gait: Intact.

## 2025-06-06 RX ADMIN — LATANOPROST PF 1 DROP(S): 0.05 SOLUTION/ DROPS OPHTHALMIC at 21:38

## 2025-06-06 RX ADMIN — Medication 0.5 MILLIGRAM(S): at 09:38

## 2025-06-06 RX ADMIN — DORZOLAMIDE 1 DROP(S): 20 SOLUTION/ DROPS OPHTHALMIC at 21:37

## 2025-06-06 RX ADMIN — LOSARTAN POTASSIUM 100 MILLIGRAM(S): 100 TABLET, FILM COATED ORAL at 09:38

## 2025-06-06 RX ADMIN — TIMOLOL MALEATE 1 DROP(S): 6.8 SOLUTION OPHTHALMIC at 21:38

## 2025-06-06 RX ADMIN — ACETAZOLAMIDE 250 MILLIGRAM(S): 250 TABLET ORAL at 20:46

## 2025-06-06 RX ADMIN — Medication 0.5 MILLIGRAM(S): at 20:46

## 2025-06-06 RX ADMIN — HALOPERIDOL 2 MILLIGRAM(S): 10 TABLET ORAL at 20:47

## 2025-06-06 RX ADMIN — HALOPERIDOL 2 MILLIGRAM(S): 10 TABLET ORAL at 09:38

## 2025-06-06 NOTE — BH INPATIENT PSYCHIATRY PROGRESS NOTE - NSBHFUPINTERVALHXFT_PSY_A_CORE
Pt today states that her  knows the answers to all of writer's questions.  She states her moods are ok.

## 2025-06-06 NOTE — BH INPATIENT PSYCHIATRY PROGRESS NOTE - MSE UNSTRUCTURED FT
Appearance: disheveled appearing; older than stated age   Attitude / Behavior / relatedness: at times with poor boundaries, limited cooperation.  Eye contact: appropriate   Motor: No abnormal movements, no psychomotor slowing or activation.  Speech: Regular rate.  Mood: "fine"  Affect: mildly irritable   Thought Process: illogical and with loose associations   Thought Content: discharge focused; denies SI, poverty of TC.  Perceptual: denies current AVH   Insight: poor   Judgment: impaired  Impulse control:  fair   Gait: Intact.

## 2025-06-07 RX ADMIN — TIMOLOL MALEATE 1 DROP(S): 6.8 SOLUTION OPHTHALMIC at 21:33

## 2025-06-07 RX ADMIN — LOSARTAN POTASSIUM 100 MILLIGRAM(S): 100 TABLET, FILM COATED ORAL at 09:37

## 2025-06-07 RX ADMIN — ACETAZOLAMIDE 250 MILLIGRAM(S): 250 TABLET ORAL at 20:53

## 2025-06-07 RX ADMIN — HALOPERIDOL 2 MILLIGRAM(S): 10 TABLET ORAL at 09:37

## 2025-06-07 RX ADMIN — Medication 0.5 MILLIGRAM(S): at 09:37

## 2025-06-07 RX ADMIN — DORZOLAMIDE 1 DROP(S): 20 SOLUTION/ DROPS OPHTHALMIC at 21:33

## 2025-06-07 RX ADMIN — Medication 0.5 MILLIGRAM(S): at 20:53

## 2025-06-07 RX ADMIN — LATANOPROST PF 1 DROP(S): 0.05 SOLUTION/ DROPS OPHTHALMIC at 21:33

## 2025-06-07 RX ADMIN — HALOPERIDOL 2 MILLIGRAM(S): 10 TABLET ORAL at 20:53

## 2025-06-08 RX ADMIN — LOSARTAN POTASSIUM 100 MILLIGRAM(S): 100 TABLET, FILM COATED ORAL at 09:15

## 2025-06-08 RX ADMIN — HALOPERIDOL 2 MILLIGRAM(S): 10 TABLET ORAL at 20:31

## 2025-06-08 RX ADMIN — HALOPERIDOL 2 MILLIGRAM(S): 10 TABLET ORAL at 09:15

## 2025-06-08 RX ADMIN — Medication 0.5 MILLIGRAM(S): at 20:30

## 2025-06-08 RX ADMIN — Medication 0.5 MILLIGRAM(S): at 09:16

## 2025-06-09 PROCEDURE — 99232 SBSQ HOSP IP/OBS MODERATE 35: CPT

## 2025-06-09 RX ORDER — LORAZEPAM 4 MG/ML
2 VIAL (ML) INJECTION ONCE
Refills: 0 | Status: DISCONTINUED | OUTPATIENT
Start: 2025-06-09 | End: 2025-06-13

## 2025-06-09 RX ORDER — LORAZEPAM 4 MG/ML
2 VIAL (ML) INJECTION EVERY 6 HOURS
Refills: 0 | Status: DISCONTINUED | OUTPATIENT
Start: 2025-06-09 | End: 2025-06-13

## 2025-06-09 RX ADMIN — HALOPERIDOL 2 MILLIGRAM(S): 10 TABLET ORAL at 08:56

## 2025-06-09 RX ADMIN — HALOPERIDOL 100 MILLIGRAM(S): 10 TABLET ORAL at 09:52

## 2025-06-09 RX ADMIN — LOSARTAN POTASSIUM 100 MILLIGRAM(S): 100 TABLET, FILM COATED ORAL at 08:56

## 2025-06-09 RX ADMIN — Medication 0.5 MILLIGRAM(S): at 08:56

## 2025-06-09 RX ADMIN — HALOPERIDOL 2 MILLIGRAM(S): 10 TABLET ORAL at 20:27

## 2025-06-09 RX ADMIN — Medication 0.5 MILLIGRAM(S): at 20:27

## 2025-06-09 NOTE — BH PSYCHOLOGY - GROUP THERAPY NOTE - TOKEN PULL-DIAGNOSIS
Primary Diagnosis:  Schizoaffective disorder [F25.9]        Problem Dx:   Hypertension [I10]      Glaucoma [H40.9]      
Primary Diagnosis:  Schizoaffective disorder [F25.9]        Problem Dx:   Hypertension [I10]      Glaucoma [H40.9]

## 2025-06-09 NOTE — BH PSYCHOLOGY - GROUP THERAPY NOTE - NSBHPSYCHOLRESPCOMMENT_PSY_A_CORE FT
The patient appeared messily groomed and adequately dressed. Pt appeared somewhat engaged in group as evidenced by her willingness to verbally communicate during check-in, sharing that she would like to have lunch with her , and is surprised he has not yet visited her. Pt was oddly related and struggled to follow topic; at times observed responding to internal stimuli. Pt was appropriate with peers.  
Pt presented with adequate hygiene and grooming. Pt appeared engaged in group as evidenced by her willingness to verbally communicate during the discussion. She was hyperverbal and tangential at times, but responsive to redirection. She shared that she has people she calls when she is struggling to cope with physical pain. Pt was oriented X3. Pt was appropriate with peers.

## 2025-06-09 NOTE — BH PSYCHOLOGY - GROUP THERAPY NOTE - NSPSYCHOLGRPCOGGOAL_PSY_A_CORE FT
Decrease symptoms, Develop coping/emotion regulation skills, Psychoeducation  
Decrease symptoms, Develop coping/emotion regulation skills, Psychoeducation

## 2025-06-09 NOTE — BH PSYCHOLOGY - GROUP THERAPY NOTE - NSPSYCHOLGRPCOGPT_PSY_A_CORE FT
Patient attended Cognitive Behavioral Therapy Group incorporating ACT-based concepts. The group began with a brief check-in asking patients to share their favorite ways to spend the day. Group then engaged in a mindful breathing mindfulness exercise and were encouraged to notice internal experiences throughout the practice. Group processed the experience and discussed challenges that arose during the exercise, as well as purpose of practicing mindfulness. Group was provided with "Healthy Perspectives on Emotions” handout and engaged in discussion about engaging in emotional experiences, labeling emotions, and acting on emotions. Group facilitators explained concepts, reinforced participation, and engaged patients in the discussion. 
Patient attended Cognitive Behavioral Therapy Group incorporating ACT-based concepts. The group started with a brief check-in asking Pts to identify and share a person they’d like to have lunch with, either living or non-living. Members then engaged in a mindful eating exercise and were encouraged to share their reactions. Lastly, group engaged in a discussion about the “Joana and Weather” ACT metaphor which illustrates the relationship between the observing self (the joana) and our thoughts and feelings (the weather). The joana is seen as a constant, unchanging presence, while the weather represents the transient nature of our thoughts and emotions. Group facilitator explained concepts, reinforced participation, and engaged patients in the discussion.

## 2025-06-09 NOTE — BH PSYCHOLOGY - GROUP THERAPY NOTE - NSPSYCHOLGRPCOGINT_PSY_A_CORE FT
Cognitive/behavioral therapy, Acceptance and Commitment therapy, Emotion regulation/coping skills taught, Psychoed 
Cognitive/behavioral therapy, Acceptance and Commitment therapy, Emotion regulation/coping skills taught, Psychoed

## 2025-06-09 NOTE — BH INPATIENT PSYCHIATRY PROGRESS NOTE - NSBHFUPINTERVALHXFT_PSY_A_CORE
Patient pleasant, inquires about discharge, tells me she knows me, that I am related to Wilmar Alves. Denies AVH. she recites (accurately) her med regimen and dosing. Denies si and hi

## 2025-06-09 NOTE — BH INPATIENT PSYCHIATRY PROGRESS NOTE - MSE UNSTRUCTURED FT
Appearance: disheveled appearing; older than stated age   Attitude / Behavior / relatedness: pleasant   Eye contact: appropriate   Motor: No abnormal movements, no psychomotor slowing or activation.  Speech: Regular rate.  Mood: "fine"  Affect: neutral.   Thought Process: more linear   Thought Content: benign; denies suicidal ideation and hi   Perceptual: denies current AVH   Insight: poor   Judgment: impaired  Impulse control:  fair   Gait: Intact.

## 2025-06-09 NOTE — BH PSYCHOLOGY - GROUP THERAPY NOTE - NSPSYCHOLGRPCOGPROB_PSY_A_CORE FT
Anxiety, Depression, Emotion dysregulation, Lack of coping skills 
Anxiety, Depression, Emotion dysregulation, Lack of coping skills

## 2025-06-10 PROCEDURE — 99232 SBSQ HOSP IP/OBS MODERATE 35: CPT

## 2025-06-10 RX ADMIN — Medication 0.5 MILLIGRAM(S): at 09:20

## 2025-06-10 RX ADMIN — HALOPERIDOL 2 MILLIGRAM(S): 10 TABLET ORAL at 20:35

## 2025-06-10 RX ADMIN — LOSARTAN POTASSIUM 100 MILLIGRAM(S): 100 TABLET, FILM COATED ORAL at 09:19

## 2025-06-10 RX ADMIN — Medication 0.5 MILLIGRAM(S): at 20:36

## 2025-06-10 RX ADMIN — HALOPERIDOL 2 MILLIGRAM(S): 10 TABLET ORAL at 09:20

## 2025-06-10 NOTE — BH INPATIENT PSYCHIATRY PROGRESS NOTE - NSBHFUPINTERVALHXFT_PSY_A_CORE
Pleasant on approach, has no complaints, denies SI And HI, denies avh. denies feeling paranoid. inquires if I spoke with her  yet. no behavioral issues. sleeping at night. no medical complaints

## 2025-06-11 PROCEDURE — 99232 SBSQ HOSP IP/OBS MODERATE 35: CPT

## 2025-06-11 RX ORDER — LOSARTAN POTASSIUM 100 MG/1
1 TABLET, FILM COATED ORAL
Qty: 14 | Refills: 0
Start: 2025-06-11 | End: 2025-06-24

## 2025-06-11 RX ORDER — BENZTROPINE MESYLATE 2 MG
1 TABLET ORAL
Qty: 28 | Refills: 0
Start: 2025-06-11 | End: 2025-06-24

## 2025-06-11 RX ORDER — TIMOLOL MALEATE 6.8 MG/ML
1 SOLUTION OPHTHALMIC
Qty: 0 | Refills: 0 | DISCHARGE
Start: 2025-06-11

## 2025-06-11 RX ORDER — AMLODIPINE BESYLATE 10 MG/1
1 TABLET ORAL
Qty: 14 | Refills: 0
Start: 2025-06-11 | End: 2025-06-24

## 2025-06-11 RX ORDER — HALOPERIDOL 10 MG/1
100 TABLET ORAL
Qty: 1 | Refills: 0
Start: 2025-06-11 | End: 2025-06-11

## 2025-06-11 RX ORDER — DORZOLAMIDE 20 MG/ML
1 SOLUTION/ DROPS OPHTHALMIC
Qty: 0 | Refills: 0 | DISCHARGE
Start: 2025-06-11

## 2025-06-11 RX ORDER — ACETAZOLAMIDE 250 MG/1
1 TABLET ORAL
Qty: 28 | Refills: 0
Start: 2025-06-11 | End: 2025-06-24

## 2025-06-11 RX ORDER — LOPERAMIDE HCL 1 MG/7.5ML
2 SOLUTION ORAL ONCE
Refills: 0 | Status: COMPLETED | OUTPATIENT
Start: 2025-06-11 | End: 2025-06-11

## 2025-06-11 RX ORDER — LATANOPROST PF 0.05 MG/ML
1 SOLUTION/ DROPS OPHTHALMIC
Qty: 0 | Refills: 0 | DISCHARGE
Start: 2025-06-11

## 2025-06-11 RX ORDER — HALOPERIDOL 10 MG/1
1 TABLET ORAL
Qty: 28 | Refills: 0
Start: 2025-06-11 | End: 2025-06-24

## 2025-06-11 RX ADMIN — HALOPERIDOL 2 MILLIGRAM(S): 10 TABLET ORAL at 20:29

## 2025-06-11 RX ADMIN — Medication 0.5 MILLIGRAM(S): at 20:30

## 2025-06-11 RX ADMIN — LOPERAMIDE HCL 2 MILLIGRAM(S): 1 SOLUTION ORAL at 12:27

## 2025-06-11 RX ADMIN — HALOPERIDOL 2 MILLIGRAM(S): 10 TABLET ORAL at 08:45

## 2025-06-11 RX ADMIN — Medication 0.5 MILLIGRAM(S): at 08:45

## 2025-06-11 RX ADMIN — LOSARTAN POTASSIUM 100 MILLIGRAM(S): 100 TABLET, FILM COATED ORAL at 08:45

## 2025-06-11 NOTE — BH INPATIENT PSYCHIATRY DISCHARGE NOTE - NSBHASSESSSUMMFT_PSY_ALL_CORE
64 year old woman  domiciled unemployed pph schizoaffective bipolar prior inpatient psychiatric hospitalization outpatient mental health follow up (through fsl act w prior aot order) no prior suicide attempts or non suicidal self injurious behavior no instances of violence aggression violation of orders of protection, [facility] breast cancer chronic kidney disease glaucoma hypertension who was brought in by ems activated by  for psychiatric evaluation    Currently, her psychotic symptoms are stable, medications optimized, aftercare secured. patient stable for ongoing care in a setting less restrictive.
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
## 2025-06-11 NOTE — BH INPATIENT PSYCHIATRY DISCHARGE NOTE - OTHER PAST PSYCHIATRIC HISTORY (INCLUDE DETAILS REGARDING ONSET, COURSE OF ILLNESS, INPATIENT/OUTPATIENT TREATMENT)
According to ED Behavioral Health Assessment, "64 year old woman  domiciled unemployed pph schizoaffective bipolar prior inpatient psychiatric hospitalization outpatient mental health follow up (through fsl act w prior aot order) no prior suicide attempts or non suicidal self injurious behavior no instances of violence aggression violation of orders of protection, [facility] breast cancer chronic kidney disease glaucoma hypertension who was brought in by ems activated by  for psychiatric evaluation. Patient admitted due to concerns for psychotic decompensation and inability to care for herself."

## 2025-06-11 NOTE — BH INPATIENT PSYCHIATRY DISCHARGE NOTE - NSBHMETABOLIC_PSY_ALL_CORE_FT
BMI: BMI (kg/m2): 23.6 (05-31-25 @ 17:41)  HbA1c:   Glucose:   BP: 149/81 (06-10-25 @ 09:33) (149/81 - 149/81)Vital Signs Last 24 Hrs  T(C): 36.9 (06-11-25 @ 07:46), Max: 36.9 (06-11-25 @ 07:46)  T(F): 98.4 (06-11-25 @ 07:46), Max: 98.4 (06-11-25 @ 07:46)  HR: --  BP: --  BP(mean): --  RR: --  SpO2: --    Orthostatic VS  06-11-25 @ 07:46  Lying BP: --/-- HR: --  Sitting BP: 140/91 HR: 68  Standing BP: 148/85 HR: 67  Site: --  Mode: --    Lipid Panel:

## 2025-06-11 NOTE — BH INPATIENT PSYCHIATRY DISCHARGE NOTE - NSDCCPCAREPLAN_GEN_ALL_CORE_FT
PRINCIPAL DISCHARGE DIAGNOSIS  Diagnosis: Schizoaffective disorder  Assessment and Plan of Treatment: medications + therapy

## 2025-06-11 NOTE — BH INPATIENT PSYCHIATRY DISCHARGE NOTE - NSBHFUPINTERVALHXFT_PSY_A_CORE
Chart reviewed and case discussed with the treatment team. No acute events overnight. On exam today the patient reported that she was doing well and reflected on the progress she made during this admission. She denied any psychotic symptoms. She stated that her mood was "good" and denied SI/HI. She stated that her medications were effective and denied side effects. She stated that she was looking forward to discharge and going to her outpatient appointment. Chart reviewed and case discussed with the treatment team. No acute events overnight. On exam today the patient reported that she was doing well and reflected on the progress she made during this admission. She denied any psychotic symptoms. She stated that her mood was "good" and denied SI/HI. She stated that her medications were effective and denied side effects. She stated that she was looking forward to discharge and following up with her outpatient treatment.

## 2025-06-11 NOTE — BH INPATIENT PSYCHIATRY DISCHARGE NOTE - NSDCMRMEDTOKEN_GEN_ALL_CORE_FT
acetaZOLAMIDE 250 mg oral tablet: 1 tab(s) orally 2 times a day  amLODIPine 10 mg oral tablet: 1 tab(s) orally once a day  benztropine 0.5 mg oral tablet: 1 tab(s) orally 2 times a day  dorzolamide 2% ophthalmic solution: 1 drop(s) to each affected eye 3 times a day  Haldol Decanoate 50 mg/mL intramuscular solution: 100 milligram(s) intramuscular every 4 weeks Next due on 7/7/25  haloperidol 2 mg oral tablet: 1 tab(s) orally 2 times a day  latanoprost 0.005% ophthalmic solution: 1 drop(s) to each affected eye once a day (at bedtime)  losartan 100 mg oral tablet: 1 tab(s) orally once a day  timolol (as maleate) 0.5% ophthalmic solution: 1 drop(s) to each affected eye 2 times a day
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
## 2025-06-11 NOTE — BH INPATIENT PSYCHIATRY PROGRESS NOTE - NSBHFUPINTERVALHXFT_PSY_A_CORE
Pleasant on approach, patient feels she is ready for discharge. She denies SI and HI, denies AVh. she asks that I call her ; I tell her that I tried but his VM was full and that I’d try again later today. She thanks writer for treatment here. she has no complaints. Sleeping well. behaviorally appropriate

## 2025-06-11 NOTE — BH INPATIENT PSYCHIATRY DISCHARGE NOTE - NSBHDCRISKMITIGATE_PSY_ALL_CORE
Safety planning/Reduction in access to lethal methods (pills, firearms, etc)/Referral to ACT Team/Family/Other social support involvement/Long acting injectable medication/Medications targeting suicidality/non-suicidal self injurious behavior

## 2025-06-11 NOTE — BH INPATIENT PSYCHIATRY DISCHARGE NOTE - MSE UNSTRUCTURED FT
Appearance: fair grooming and hygiene  Attitude / Behavior / relatedness: pleasant. cooperative   Eye contact: appropriate   Motor: No abnormal movements, no psychomotor slowing or activation.  Speech: Regular rate.  Mood: "good"  Affect: constricted    Thought Process: linear, logical, goal oriented  Thought Content: benign; denies suicidal ideation and hi   Perceptual: denies current AVH   Insight: fair   Judgment: intact  Impulse control: appropriate  Gait: Intact.

## 2025-06-11 NOTE — BH INPATIENT PSYCHIATRY DISCHARGE NOTE - NSBHDCMEDICALFT_PSY_A_CORE
Patient with HTN, Glaucoma, was partially adherent to her ordered medical medications. labs and ekg were ordered to monitor her chronic conditions during the hospital stay, though she declined

## 2025-06-12 PROCEDURE — 99232 SBSQ HOSP IP/OBS MODERATE 35: CPT

## 2025-06-12 RX ADMIN — LOSARTAN POTASSIUM 100 MILLIGRAM(S): 100 TABLET, FILM COATED ORAL at 08:33

## 2025-06-12 RX ADMIN — DORZOLAMIDE 1 DROP(S): 20 SOLUTION/ DROPS OPHTHALMIC at 12:51

## 2025-06-12 RX ADMIN — Medication 0.5 MILLIGRAM(S): at 20:24

## 2025-06-12 RX ADMIN — DORZOLAMIDE 1 DROP(S): 20 SOLUTION/ DROPS OPHTHALMIC at 08:34

## 2025-06-12 RX ADMIN — HALOPERIDOL 2 MILLIGRAM(S): 10 TABLET ORAL at 20:24

## 2025-06-12 RX ADMIN — HALOPERIDOL 2 MILLIGRAM(S): 10 TABLET ORAL at 08:33

## 2025-06-12 RX ADMIN — Medication 0.5 MILLIGRAM(S): at 08:34

## 2025-06-12 NOTE — BH INPATIENT PSYCHIATRY PROGRESS NOTE - NSBHASSESSSUMMFT_PSY_ALL_CORE
64 yo F with schizoaffective disorder, prior hospitalizations, medical hx of CKD, breast CA, HTN admitted for psychotic decompensation.  History and current presentation suggestive of acute decompensation of psychosis and possible henrietta in setting of psychosocial stressors (as noted above) and poor compliance to treatment (VPA level low).     calm, pleasant, cooperative. dispo planning     Standard OBS  Haldol 2mg BID; Haldol dec 100mg given 5/9   c/w medical medications as ordered (for HTN, Glaucoma, CKD). Labs and ekg as indicated (pt refused on 6/3). coordinate with hospitalist as indicated   coordinate with hospitalist as needed  coordinate with s/w re. disposition
64 yo F with schizoaffective disorder, prior hospitalizations, medical hx of CKD, breast CA, HTN admitted for psychotic decompensation.  History and current presentation suggestive of acute decompensation of psychosis and possible henrietta in setting of psychosocial stressors (as noted above) and poor compliance to treatment (VPA level low).     patient appears near baseline (as confirmed by her  from conversation with him yesterday). planned discharge tomorrow. staff to repeat vitals       Standard OBS  Haldol 2mg BID; Haldol dec 100mg given 5/9   c/w medical medications as ordered (for HTN, Glaucoma, CKD). Labs and ekg as indicated (pt refused on 6/3). coordinate with hospitalist as indicated   coordinate with hospitalist as needed  coordinate with s/w re. disposition
64 yo F with schizoaffective disorder, prior hospitalizations, medical hx of CKD, breast CA, HTN admitted for psychotic decompensation.  History and current presentation suggestive of acute decompensation of psychosis and possible henrietta in setting of psychosocial stressors (as noted above) and poor compliance to treatment (VPA level low).     calm, cooperative, pleasant; significant improvements in presentation relative to initial prsentation. aftercare planning in progress       Standard OBS  Haldol 2mg BID; Haldol dec 100mg given 5/9   c/w medical medications as ordered (for HTN, Glaucoma, CKD). Labs and ekg as indicated (pt refused on 6/3). coordinate with hospitalist as indicated   coordinate with hospitalist as needed  coordinate with s/w re. disposition
66 yo F with schizoaffective disorder, prior hospitalizations, medical hx of CKD, breast CA, HTN admitted for psychotic decompensation.  History and current presentation suggestive of acute decompensation of psychosis and possible henrietta in setting of psychosocial stressors (as noted above) and poor compliance to treatment (VPA level low).     continues to exhibit acute psychotic symptoms and with impaired understanding of reality. refusing glaucoma medications. declined labs and ekg. we are awaiting callback from act team. increase Haldol dec to 3mg tdd      Standard OBS  Haldol 3mg TDD (1mg daily + 2mg QHS); haldol dec 100mg q28 days next due 6/9 (needs confirmation)   Defer VPA for now until confirmation with collatearl   discontinue Wellbutrin   c/w medical medications as ordered (for HTN, Glaucoma, CKD). Labs and ekg as indicated (pt refused on 6/3). coordinate with hospitalist as indicated   coordinate with hospitalist as needed  coordinate with s/w re. disposition
66 yo F with schizoaffective disorder, prior hospitalizations, medical hx of CKD, breast CA, HTN admitted for psychotic decompensation.  History and current presentation suggestive of acute decompensation of psychosis and possible henrietta in setting of psychosocial stressors (as noted above) and poor compliance to treatment (VPA level low).     improved affect; more pleasant, still with impairment sin reality testing. Haldol dec received today      Standard OBS  Haldol 2mg BID; Haldol dec 100mg given 5/9   c/w medical medications as ordered (for HTN, Glaucoma, CKD). Labs and ekg as indicated (pt refused on 6/3). coordinate with hospitalist as indicated   coordinate with hospitalist as needed  coordinate with s/w re. disposition
66 yo F with schizoaffective disorder, prior hospitalizations, medical hx of CKD, breast CA, HTN admitted for psychotic decompensation.  History and current presentation suggestive of acute decompensation of psychosis and possible henrietta in setting of psychosocial stressors (as noted above) and poor compliance to treatment (VPA level low).     patient remains discharge focused, at times irritable, illogical thought process , and with impairments in reality testing but with relative improvements. team confirmed with ACT team that she is due for Haldol dec 100mg on 6/9/25 and ordered.      Standard OBS  Haldol 2mg BID (1mg daily + 2mg QHS); haldol dec 100mg q28 days next due 6/9   c/w medical medications as ordered (for HTN, Glaucoma, CKD). Labs and ekg as indicated (pt refused on 6/3). coordinate with hospitalist as indicated   coordinate with hospitalist as needed  coordinate with s/w re. disposition
66 yo F with schizoaffective disorder, prior hospitalizations, medical hx of CKD, breast CA, HTN admitted for psychotic decompensation.  History and current presentation suggestive of acute decompensation of psychosis and possible henrietta in setting of psychosocial stressors (as noted above) and poor compliance to treatment (VPA level low).     ongoing psychosis though some improvements. patient remains discharge focused. will need collateral (still awaiting callback)     Standard OBS  Haldol 3mg TDD (1mg daily + 2mg QHS); haldol dec 100mg q28 days next due 6/9 (needs confirmation)   Defer VPA for now until confirmation with collatearl   discontinue Wellbutrin   c/w medical medications as ordered (for HTN, Glaucoma, CKD). Labs and ekg as indicated (pt refused on 6/3). coordinate with hospitalist as indicated   coordinate with hospitalist as needed  coordinate with s/w re. disposition
64 yo F with schizoaffective disorder, prior hospitalizations, medical hx of CKD, breast CA, HTN admitted for psychotic decompensation.  History and current presentation suggestive of acute decompensation of psychosis and possible henrietta in setting of psychosocial stressors (as noted above) and poor compliance to treatment (VPA level low).     ongoing thought disorganization and with impairments in reality testing. plan as below. pt refusing medications      Standard OBS  Haldol 1mg BID; haldol dec 100mg q28 days next due 6/9 (needs confirmation)   Defer VPA for now until we obtain weights / confirm with collateral  Continue Wellbutrin for now but consider discontinuing in setting of increased reported irritability out in the community  c/w medical medications as ordered  coordinate with hospitalist as needed  coordinate with s/w re. disposition
64 yo F with schizoaffective disorder, prior hospitalizations, medical hx of CKD, breast CA, HTN admitted for psychotic decompensation.  History and current presentation suggestive of acute decompensation of psychosis and possible henrietta in setting of psychosocial stressors (as noted above) and poor compliance to treatment (VPA level low).     remains disorganized.     Standard OBS  Haldol 2mg BID (1mg daily + 2mg QHS); haldol dec 100mg q28 days next due 6/9, confirmed w/ACT  c/w medical medications as ordered (for HTN, Glaucoma, CKD). Labs and ekg as indicated (pt refused on 6/3). coordinate with hospitalist as indicated   coordinate with hospitalist as needed  coordinate with s/w re. disposition

## 2025-06-12 NOTE — BH INPATIENT PSYCHIATRY PROGRESS NOTE - NSBHFUPINTERVALCCFT_PSY_A_CORE
follow up psychosis

## 2025-06-12 NOTE — BH INPATIENT PSYCHIATRY PROGRESS NOTE - NSTXDCFAMDATETRGT_PSY_ALL_CORE
09-Jun-2025

## 2025-06-12 NOTE — BH INPATIENT PSYCHIATRY PROGRESS NOTE - NSTXDCFAMDATEEST_PSY_ALL_CORE
02-Jun-2025

## 2025-06-12 NOTE — BH INPATIENT PSYCHIATRY PROGRESS NOTE - NSTXDISORGDATEEST_PSY_ALL_CORE
01-Jun-2025

## 2025-06-12 NOTE — BH TREATMENT PLAN - NSTXDISORGINTERPR_PSY_ALL_CORE
Psychiatric Rehabilitation staff will continue to provide encouragement, support, psychotherapy, and psychoeducation to assist the Pt in the progression of her treatment goal and engagement with activities.
Writer met with patient to assess patients progress towards specified goal of demonstrating related thoughts for 5 minutes in conversation. Patient was guarded and tense upon approach but became slightly more receptive to writer during meeting. Patient has demonstrated some progress towards this goal. Patient was observed to have some related thoughts during meeting but continues to make odd/irrelevant comments. Patient has attended 45 percent of psychiatric rehabilitation groups, mainly consisting of leisure-based activities (art, music). Patient is able to follow along with group activities with staff encouragement and redirection. Patient socializes with select peers appropriately. Psych rehab will continue to work with patient to provide support.

## 2025-06-12 NOTE — BH INPATIENT PSYCHIATRY PROGRESS NOTE - PRN MEDS
MEDICATIONS  (PRN):  haloperidol     Tablet 5 milliGRAM(s) Oral every 6 hours PRN agitation 2/2 psychosis  LORazepam     Tablet 2 milliGRAM(s) Oral every 6 hours PRN agitation 2/2 psychosis  nicotine  Polacrilex Gum 2 milliGRAM(s) Oral every 3 hours PRN Smoking Cessation  
MEDICATIONS  (PRN):  haloperidol     Tablet 5 milliGRAM(s) Oral every 6 hours PRN agitation 2/2 psychosis  LORazepam     Tablet 2 milliGRAM(s) Oral every 6 hours PRN agitation 2/2 psychosis  
MEDICATIONS  (PRN):  haloperidol     Tablet 5 milliGRAM(s) Oral every 6 hours PRN agitation 2/2 psychosis  LORazepam     Tablet 2 milliGRAM(s) Oral every 6 hours PRN agitation 2/2 psychosis  nicotine  Polacrilex Gum 2 milliGRAM(s) Oral every 3 hours PRN Smoking Cessation  
MEDICATIONS  (PRN):  haloperidol     Tablet 5 milliGRAM(s) Oral every 6 hours PRN agitation 2/2 psychosis  LORazepam     Tablet 2 milliGRAM(s) Oral every 6 hours PRN agitation 2/2 psychosis  nicotine  Polacrilex Gum 2 milliGRAM(s) Oral every 3 hours PRN Smoking Cessation  
MEDICATIONS  (PRN):  haloperidol     Tablet 5 milliGRAM(s) Oral every 6 hours PRN agitation 2/2 psychosis  LORazepam     Tablet 2 milliGRAM(s) Oral every 6 hours PRN agitation 2/2 psychosis

## 2025-06-12 NOTE — BH INPATIENT PSYCHIATRY PROGRESS NOTE - NSBHMETABOLIC_PSY_ALL_CORE_FT
BMI: BMI (kg/m2): 23.6 (05-31-25 @ 17:41)  HbA1c:   Glucose:   BP: --Vital Signs Last 24 Hrs  T(C): --  T(F): --  HR: --  BP: --  BP(mean): --  RR: --  SpO2: --      Lipid Panel: 
BMI: BMI (kg/m2): 23.6 (05-31-25 @ 17:41)  HbA1c:   Glucose:   BP: --Vital Signs Last 24 Hrs  T(C): --  T(F): --  HR: --  BP: --  BP(mean): --  RR: --  SpO2: --      Lipid Panel: 
BMI: BMI (kg/m2): 23.6 (05-31-25 @ 17:41)  HbA1c:   Glucose:   BP: 149/81 (06-10-25 @ 09:33) (149/81 - 149/81)Vital Signs Last 24 Hrs  T(C): 36.9 (06-11-25 @ 07:46), Max: 36.9 (06-11-25 @ 07:46)  T(F): 98.4 (06-11-25 @ 07:46), Max: 98.4 (06-11-25 @ 07:46)  HR: --  BP: --  BP(mean): --  RR: --  SpO2: --    Orthostatic VS  06-11-25 @ 07:46  Lying BP: --/-- HR: --  Sitting BP: 140/91 HR: 68  Standing BP: 148/85 HR: 67  Site: --  Mode: --    Lipid Panel: 
BMI: BMI (kg/m2): 23.6 (05-31-25 @ 17:41)  HbA1c:   Glucose:   BP: 149/81 (06-10-25 @ 09:33) (149/81 - 149/81)Vital Signs Last 24 Hrs  T(C): 36.7 (06-10-25 @ 09:33), Max: 36.7 (06-10-25 @ 09:33)  T(F): 98 (06-10-25 @ 09:33), Max: 98 (06-10-25 @ 09:33)  HR: 69 (06-10-25 @ 09:33) (69 - 69)  BP: 149/81 (06-10-25 @ 09:33) (149/81 - 149/81)  BP(mean): --  RR: 17 (06-10-25 @ 09:33) (17 - 17)  SpO2: --      Lipid Panel: 
BMI: BMI (kg/m2): 23.6 (05-31-25 @ 17:41)  HbA1c:   Glucose:   BP: 149/81 (06-10-25 @ 09:33) (149/81 - 149/81)Vital Signs Last 24 Hrs  T(C): 37 (06-12-25 @ 08:27), Max: 37 (06-12-25 @ 08:27)  T(F): 98.6 (06-12-25 @ 08:27), Max: 98.6 (06-12-25 @ 08:27)  HR: --  BP: --  BP(mean): --  RR: --  SpO2: --    Orthostatic VS  06-12-25 @ 08:27  Lying BP: --/-- HR: --  Sitting BP: 90/77 HR: 51  Standing BP: 150/73 HR: 60  Site: --  Mode: --  Orthostatic VS  06-11-25 @ 07:46  Lying BP: --/-- HR: --  Sitting BP: 140/91 HR: 68  Standing BP: 148/85 HR: 67  Site: --  Mode: --    Lipid Panel: 
BMI: BMI (kg/m2): 23.6 (05-31-25 @ 17:41)  HbA1c:   Glucose:   BP: --Vital Signs Last 24 Hrs  T(C): --  T(F): --  HR: --  BP: --  BP(mean): --  RR: --  SpO2: --      Lipid Panel: 
BMI: BMI (kg/m2): 23.6 (05-31-25 @ 17:41)  HbA1c:   Glucose:   BP: --Vital Signs Last 24 Hrs  T(C): --  T(F): --  HR: --  BP: --  BP(mean): --  RR: --  SpO2: --    Orthostatic VS  06-01-25 @ 06:34  Lying BP: --/-- HR: --  Sitting BP: 156/67 HR: 57  Standing BP: 147/89 HR: 66  Site: --  Mode: --  Orthostatic VS  05-31-25 @ 17:41  Lying BP: --/-- HR: --  Sitting BP: 139/65 HR: 66  Standing BP: 136/60 HR: 75  Site: --  Mode: --    Lipid Panel: 
BMI: BMI (kg/m2): 23.6 (05-31-25 @ 17:41)  HbA1c:   Glucose:   BP: --Vital Signs Last 24 Hrs  T(C): --  T(F): --  HR: --  BP: --  BP(mean): --  RR: --  SpO2: --      Lipid Panel: 
BMI: BMI (kg/m2): 23.6 (05-31-25 @ 17:41)  HbA1c:   Glucose:   BP: 143/82 (06-07-25 @ 09:39) (143/82 - 143/82)Vital Signs Last 24 Hrs  T(C): --  T(F): --  HR: --  BP: --  BP(mean): --  RR: --  SpO2: --    Orthostatic VS  06-08-25 @ 08:07  Lying BP: --/-- HR: --  Sitting BP: 128/66 HR: 64  Standing BP: --/-- HR: --  Site: --  Mode: --    Lipid Panel:

## 2025-06-12 NOTE — BH INPATIENT PSYCHIATRY PROGRESS NOTE - NSBHATTESTBILLING_PSY_A_CORE
79339-Bfzqrhartl OBS or IP - moderate complexity OR 35-49 mins
10292-Sbclylaryb OBS or IP - moderate complexity OR 35-49 mins
16608-Zguyeskcke OBS or IP - moderate complexity OR 35-49 mins
95588-Bncfkwxyro OBS or IP - moderate complexity OR 35-49 mins
73123-Bogfdloxdk OBS or IP - moderate complexity OR 35-49 mins
37469-Omtwyjcpfy OBS or IP - moderate complexity OR 35-49 mins
64212-Nieevcqiaz OBS or IP - moderate complexity OR 35-49 mins
23601-Pzscuwxqac OBS or IP - moderate complexity OR 35-49 mins
51478-Wbzudwalaj OBS or IP - moderate complexity OR 35-49 mins

## 2025-06-12 NOTE — BH INPATIENT PSYCHIATRY PROGRESS NOTE - NSTXPSYCHOINTERMD_PSY_ALL_CORE
medications + therapy

## 2025-06-12 NOTE — BH DISCHARGE NOTE NURSING/SOCIAL WORK/PSYCH REHAB - DISCHARGE INSTRUCTIONS AFTERCARE APPOINTMENTS
In order to check the location, date, or time of your aftercare appointment, please refer to your Discharge Instructions Document given to you upon leaving the hospital.  If you have lost the instructions please call [phone]

## 2025-06-12 NOTE — BH INPATIENT PSYCHIATRY PROGRESS NOTE - NSICDXBHSECONDARYDX_PSY_ALL_CORE
Glaucoma   H40.9  Hypertension   I10  

## 2025-06-12 NOTE — BH DISCHARGE NOTE NURSING/SOCIAL WORK/PSYCH REHAB - NSDCPRGOAL_PSY_ALL_CORE
Writer met with patient in order to review progress toward rehabilitation goals and assess current functioning. Patient was cooperative. Patient was hospitalized due to worsening psychosis and possible henrietta secondary to psychosocial stressors and poor compliance. Patient made progress in regards to her functioning as well as her rehabilitation goals. Patient exhibited ability to engage in five minute conversation with no irrational thoughts. Patient has been compliant with medication and attended approximately 50% of rehab groups, mostly leisure-based. Patient maintains her ADLs independently. Patient denied AH/VH/SI/HI, and reported that she is looking forward to discharge.   Patient and writer were able to establish a safety plan prior to her discharge.   Psych rehab recommends the patient continue to comply with treatment/medication, and utilize effective coping skills to manage sx after d/c.

## 2025-06-12 NOTE — BH INPATIENT PSYCHIATRY PROGRESS NOTE - NSTXPSYCHOGOAL_PSY_ALL_CORE
Will identify 1 trigger/stressor that exacerbates thoughts

## 2025-06-12 NOTE — BH INPATIENT PSYCHIATRY PROGRESS NOTE - CURRENT MEDICATION
MEDICATIONS  (STANDING):  acetaZOLAMIDE    Tablet 250 milliGRAM(s) Oral two times a day  amLODIPine   Tablet 10 milliGRAM(s) Oral daily  benztropine 0.5 milliGRAM(s) Oral two times a day  dorzolamide 2% Ophthalmic Solution 1 Drop(s) Right EYE three times a day  haloperidol     Tablet 2 milliGRAM(s) Oral at bedtime  haloperidol     Tablet 2 milliGRAM(s) Oral daily  haloperidol    Injectable 5 milliGRAM(s) IntraMuscular once  latanoprost 0.005% Ophthalmic Solution 1 Drop(s) Both EYES at bedtime  LORazepam   Injectable 2 milliGRAM(s) IntraMuscular once  losartan 100 milliGRAM(s) Oral daily  timolol 0.5% Solution 1 Drop(s) Both EYES two times a day    MEDICATIONS  (PRN):  haloperidol     Tablet 5 milliGRAM(s) Oral every 6 hours PRN agitation 2/2 psychosis  LORazepam     Tablet 2 milliGRAM(s) Oral every 6 hours PRN agitation 2/2 psychosis  nicotine  Polacrilex Gum 2 milliGRAM(s) Oral every 3 hours PRN Smoking Cessation  
MEDICATIONS  (STANDING):  acetaZOLAMIDE    Tablet 250 milliGRAM(s) Oral two times a day  amLODIPine   Tablet 10 milliGRAM(s) Oral daily  benztropine 0.5 milliGRAM(s) Oral two times a day  dorzolamide 2% Ophthalmic Solution 1 Drop(s) Right EYE three times a day  haloperidol     Tablet 2 milliGRAM(s) Oral at bedtime  haloperidol     Tablet 2 milliGRAM(s) Oral daily  haloperidol    Injectable 5 milliGRAM(s) IntraMuscular once  latanoprost 0.005% Ophthalmic Solution 1 Drop(s) Both EYES at bedtime  LORazepam   Injectable 2 milliGRAM(s) IntraMuscular once  losartan 100 milliGRAM(s) Oral daily  timolol 0.5% Solution 1 Drop(s) Both EYES two times a day    MEDICATIONS  (PRN):  haloperidol     Tablet 5 milliGRAM(s) Oral every 6 hours PRN agitation 2/2 psychosis  LORazepam     Tablet 2 milliGRAM(s) Oral every 6 hours PRN agitation 2/2 psychosis  nicotine  Polacrilex Gum 2 milliGRAM(s) Oral every 3 hours PRN Smoking Cessation  
MEDICATIONS  (STANDING):  acetaZOLAMIDE    Tablet 250 milliGRAM(s) Oral two times a day  amLODIPine   Tablet 10 milliGRAM(s) Oral daily  benztropine 0.5 milliGRAM(s) Oral two times a day  dorzolamide 2% Ophthalmic Solution 1 Drop(s) Right EYE three times a day  haloperidol     Tablet 2 milliGRAM(s) Oral at bedtime  haloperidol     Tablet 2 milliGRAM(s) Oral daily  haloperidol    Injectable 5 milliGRAM(s) IntraMuscular once  latanoprost 0.005% Ophthalmic Solution 1 Drop(s) Both EYES at bedtime  LORazepam   Injectable 2 milliGRAM(s) IntraMuscular once  losartan 100 milliGRAM(s) Oral daily  timolol 0.5% Solution 1 Drop(s) Both EYES two times a day    MEDICATIONS  (PRN):  haloperidol     Tablet 5 milliGRAM(s) Oral every 6 hours PRN agitation 2/2 psychosis  LORazepam     Tablet 2 milliGRAM(s) Oral every 6 hours PRN agitation 2/2 psychosis  
MEDICATIONS  (STANDING):  acetaZOLAMIDE    Tablet 250 milliGRAM(s) Oral two times a day  amLODIPine   Tablet 10 milliGRAM(s) Oral daily  benztropine 0.5 milliGRAM(s) Oral two times a day  dorzolamide 2% Ophthalmic Solution 1 Drop(s) Right EYE three times a day  haloperidol     Tablet 2 milliGRAM(s) Oral at bedtime  haloperidol    Injectable 5 milliGRAM(s) IntraMuscular once  latanoprost 0.005% Ophthalmic Solution 1 Drop(s) Both EYES at bedtime  LORazepam   Injectable 2 milliGRAM(s) IntraMuscular once  losartan 100 milliGRAM(s) Oral daily  timolol 0.5% Solution 1 Drop(s) Both EYES two times a day    MEDICATIONS  (PRN):  haloperidol     Tablet 5 milliGRAM(s) Oral every 6 hours PRN agitation 2/2 psychosis  LORazepam     Tablet 2 milliGRAM(s) Oral every 6 hours PRN agitation 2/2 psychosis  
MEDICATIONS  (STANDING):  acetaZOLAMIDE    Tablet 250 milliGRAM(s) Oral two times a day  amLODIPine   Tablet 10 milliGRAM(s) Oral daily  benztropine 0.5 milliGRAM(s) Oral two times a day  dorzolamide 2% Ophthalmic Solution 1 Drop(s) Right EYE three times a day  haloperidol     Tablet 2 milliGRAM(s) Oral at bedtime  haloperidol     Tablet 2 milliGRAM(s) Oral daily  haloperidol    Injectable 5 milliGRAM(s) IntraMuscular once  latanoprost 0.005% Ophthalmic Solution 1 Drop(s) Both EYES at bedtime  LORazepam   Injectable 2 milliGRAM(s) IntraMuscular once  losartan 100 milliGRAM(s) Oral daily  timolol 0.5% Solution 1 Drop(s) Both EYES two times a day    MEDICATIONS  (PRN):  haloperidol     Tablet 5 milliGRAM(s) Oral every 6 hours PRN agitation 2/2 psychosis  LORazepam     Tablet 2 milliGRAM(s) Oral every 6 hours PRN agitation 2/2 psychosis  nicotine  Polacrilex Gum 2 milliGRAM(s) Oral every 3 hours PRN Smoking Cessation  
MEDICATIONS  (STANDING):  acetaZOLAMIDE    Tablet 250 milliGRAM(s) Oral two times a day  amLODIPine   Tablet 10 milliGRAM(s) Oral daily  benztropine 0.5 milliGRAM(s) Oral two times a day  dorzolamide 2% Ophthalmic Solution 1 Drop(s) Right EYE three times a day  haloperidol     Tablet 2 milliGRAM(s) Oral at bedtime  haloperidol     Tablet 1 milliGRAM(s) Oral daily  haloperidol    Injectable 5 milliGRAM(s) IntraMuscular once  latanoprost 0.005% Ophthalmic Solution 1 Drop(s) Both EYES at bedtime  LORazepam   Injectable 2 milliGRAM(s) IntraMuscular once  losartan 100 milliGRAM(s) Oral daily  timolol 0.5% Solution 1 Drop(s) Both EYES two times a day    MEDICATIONS  (PRN):  haloperidol     Tablet 5 milliGRAM(s) Oral every 6 hours PRN agitation 2/2 psychosis  LORazepam     Tablet 2 milliGRAM(s) Oral every 6 hours PRN agitation 2/2 psychosis  
MEDICATIONS  (STANDING):  acetaZOLAMIDE    Tablet 250 milliGRAM(s) Oral two times a day  amLODIPine   Tablet 10 milliGRAM(s) Oral daily  benztropine 0.5 milliGRAM(s) Oral two times a day  dorzolamide 2% Ophthalmic Solution 1 Drop(s) Right EYE three times a day  haloperidol     Tablet 2 milliGRAM(s) Oral at bedtime  haloperidol     Tablet 2 milliGRAM(s) Oral daily  haloperidol    Injectable 5 milliGRAM(s) IntraMuscular once  latanoprost 0.005% Ophthalmic Solution 1 Drop(s) Both EYES at bedtime  LORazepam   Injectable 2 milliGRAM(s) IntraMuscular once  losartan 100 milliGRAM(s) Oral daily  timolol 0.5% Solution 1 Drop(s) Both EYES two times a day    MEDICATIONS  (PRN):  haloperidol     Tablet 5 milliGRAM(s) Oral every 6 hours PRN agitation 2/2 psychosis  LORazepam     Tablet 2 milliGRAM(s) Oral every 6 hours PRN agitation 2/2 psychosis  nicotine  Polacrilex Gum 2 milliGRAM(s) Oral every 3 hours PRN Smoking Cessation  
MEDICATIONS  (STANDING):  acetaZOLAMIDE    Tablet 250 milliGRAM(s) Oral two times a day  amLODIPine   Tablet 10 milliGRAM(s) Oral daily  benztropine 0.5 milliGRAM(s) Oral two times a day  dorzolamide 2% Ophthalmic Solution 1 Drop(s) Right EYE three times a day  haloperidol     Tablet 2 milliGRAM(s) Oral at bedtime  haloperidol     Tablet 2 milliGRAM(s) Oral daily  haloperidol    Injectable 5 milliGRAM(s) IntraMuscular once  latanoprost 0.005% Ophthalmic Solution 1 Drop(s) Both EYES at bedtime  LORazepam   Injectable 2 milliGRAM(s) IntraMuscular once  losartan 100 milliGRAM(s) Oral daily  timolol 0.5% Solution 1 Drop(s) Both EYES two times a day    MEDICATIONS  (PRN):  haloperidol     Tablet 5 milliGRAM(s) Oral every 6 hours PRN agitation 2/2 psychosis  LORazepam     Tablet 2 milliGRAM(s) Oral every 6 hours PRN agitation 2/2 psychosis  nicotine  Polacrilex Gum 2 milliGRAM(s) Oral every 3 hours PRN Smoking Cessation  
MEDICATIONS  (STANDING):  acetaZOLAMIDE    Tablet 250 milliGRAM(s) Oral two times a day  amLODIPine   Tablet 10 milliGRAM(s) Oral daily  benztropine 0.5 milliGRAM(s) Oral two times a day  buPROPion XL (24-Hour) 150 milliGRAM(s) Oral daily  dorzolamide 2% Ophthalmic Solution 1 Drop(s) Right EYE three times a day  haloperidol     Tablet 1 milliGRAM(s) Oral two times a day  haloperidol    Injectable 5 milliGRAM(s) IntraMuscular once  latanoprost 0.005% Ophthalmic Solution 1 Drop(s) Both EYES at bedtime  LORazepam   Injectable 2 milliGRAM(s) IntraMuscular once  losartan 100 milliGRAM(s) Oral daily  timolol 0.5% Solution 1 Drop(s) Both EYES two times a day    MEDICATIONS  (PRN):  haloperidol     Tablet 5 milliGRAM(s) Oral every 6 hours PRN agitation 2/2 psychosis  LORazepam     Tablet 2 milliGRAM(s) Oral every 6 hours PRN agitation 2/2 psychosis

## 2025-06-12 NOTE — BH DISCHARGE NOTE NURSING/SOCIAL WORK/PSYCH REHAB - NSDCPRRECOMMEND_PSY_ALL_CORE
Patient will resume treatment with the [facility] for ongoing medication management, support, and psychotherapy.

## 2025-06-12 NOTE — BH INPATIENT PSYCHIATRY PROGRESS NOTE - MSE UNSTRUCTURED FT
Appearance: disheveled appearing; older than stated age   Attitude / Behavior / relatedness: pleasant. cooperative   Eye contact: appropriate   Motor: No abnormal movements, no psychomotor slowing or activation.  Speech: Regular rate.  Mood: "okay"  Affect: neutral.   Thought Process: more linear   Thought Content: benign; denies suicidal ideation and hi   Perceptual: denies current AVH   Insight: partial   Judgment: improving  Impulse control: appropriate  Gait: Intact.

## 2025-06-12 NOTE — BH DISCHARGE NOTE NURSING/SOCIAL WORK/PSYCH REHAB - FINANCIAL ASSISTANCE
[facility] provides services at a reduced cost to those who are determined to be eligible through [facility]’s financial assistance program. Information regarding [facility]’s financial assistance program can be found by going to [email or web address] or by calling [phone].

## 2025-06-12 NOTE — BH INPATIENT PSYCHIATRY PROGRESS NOTE - NSBHFUPINTERVALHXFT_PSY_A_CORE
No new complaints. Pleasant on approach, reports feeling a little more tired today, states she wants to sleep in. says her  is coming to visit at 1pm today. She is excited to hear about discharge tomorrow. No medical complaints. Vital signs documented today appear to be erroneous. Will ask staff to repeat.

## 2025-06-12 NOTE — BH INPATIENT PSYCHIATRY PROGRESS NOTE - NSBHCHARTREVIEWVS_PSY_A_CORE FT
Vital Signs Last 24 Hrs  T(C): --  T(F): --  HR: --  BP: --  BP(mean): --  RR: --  SpO2: --    Orthostatic VS  06-08-25 @ 08:07  Lying BP: --/-- HR: --  Sitting BP: 128/66 HR: 64  Standing BP: --/-- HR: --  Site: --  Mode: --  
Vital Signs Last 24 Hrs  T(C): 36.7 (06-10-25 @ 09:33), Max: 36.7 (06-10-25 @ 09:33)  T(F): 98 (06-10-25 @ 09:33), Max: 98 (06-10-25 @ 09:33)  HR: 69 (06-10-25 @ 09:33) (69 - 69)  BP: 149/81 (06-10-25 @ 09:33) (149/81 - 149/81)  BP(mean): --  RR: 17 (06-10-25 @ 09:33) (17 - 17)  SpO2: --    
Vital Signs Last 24 Hrs  T(C): --  T(F): --  HR: --  BP: --  BP(mean): --  RR: --  SpO2: --    
Vital Signs Last 24 Hrs  T(C): 36.9 (06-11-25 @ 07:46), Max: 36.9 (06-11-25 @ 07:46)  T(F): 98.4 (06-11-25 @ 07:46), Max: 98.4 (06-11-25 @ 07:46)  HR: --  BP: --  BP(mean): --  RR: --  SpO2: --    Orthostatic VS  06-11-25 @ 07:46  Lying BP: --/-- HR: --  Sitting BP: 140/91 HR: 68  Standing BP: 148/85 HR: 67  Site: --  Mode: --  
Vital Signs Last 24 Hrs  T(C): --  T(F): --  HR: --  BP: --  BP(mean): --  RR: --  SpO2: --    
Vital Signs Last 24 Hrs  T(C): 37 (06-12-25 @ 08:27), Max: 37 (06-12-25 @ 08:27)  T(F): 98.6 (06-12-25 @ 08:27), Max: 98.6 (06-12-25 @ 08:27)  HR: --  BP: --  BP(mean): --  RR: --  SpO2: --    Orthostatic VS  06-12-25 @ 08:27  Lying BP: --/-- HR: --  Sitting BP: 90/77 HR: 51  Standing BP: 150/73 HR: 60  Site: --  Mode: --  Orthostatic VS  06-11-25 @ 07:46  Lying BP: --/-- HR: --  Sitting BP: 140/91 HR: 68  Standing BP: 148/85 HR: 67  Site: --  Mode: --  
Vital Signs Last 24 Hrs  T(C): --  T(F): --  HR: --  BP: --  BP(mean): --  RR: --  SpO2: --    Orthostatic VS  06-01-25 @ 06:34  Lying BP: --/-- HR: --  Sitting BP: 156/67 HR: 57  Standing BP: 147/89 HR: 66  Site: --  Mode: --  Orthostatic VS  05-31-25 @ 17:41  Lying BP: --/-- HR: --  Sitting BP: 139/65 HR: 66  Standing BP: 136/60 HR: 75  Site: --  Mode: --

## 2025-06-12 NOTE — BH DISCHARGE NOTE NURSING/SOCIAL WORK/PSYCH REHAB - NSCDUDCCRISIS_PSY_A_CORE
.National Suicide Prevention Lifeline 3 [phone] 823-7154/.  [facility]  [address] [place], [place] [address] [phone]   Hours:  Monday through Friday from 9 AM to 3 PM/988 Suicide and Crisis Lifeline

## 2025-06-12 NOTE — BH INPATIENT PSYCHIATRY PROGRESS NOTE - NSTREATMENTCERT_PSY_ALL_CORE
.
[facility] Counseling: I discussed with the patient the risks of tralokinumab including but not limited to eye infection and irritation, cold sores, injection site reactions, worsening of asthma, allergic reactions and increased risk of parasitic infection.  Live vaccines should be avoided while taking tralokinumab. The patient understands that monitoring is required and they must alert us or the primary physician if symptoms of infection or other concerning signs are noted.

## 2025-06-12 NOTE — BH TREATMENT PLAN - NSPTSTATEDGOAL_PSY_ALL_CORE
"I am going to be discharged soon".
When pt is no longer an acute or imminent risk of harm to self or others, and is able to care for self safely, pt may then be discharged. 
"I am going to be discharged soon".

## 2025-06-12 NOTE — BH DISCHARGE NOTE NURSING/SOCIAL WORK/PSYCH REHAB - PATIENT PORTAL LINK FT
You can access the FollowMyHealth Patient Portal offered by [facility] by registering at the following website: [email or web address]. By joining [facility]’s FollowMyHealth portal, you will also be able to view your health information using other applications (apps) compatible with our system.

## 2025-06-12 NOTE — BH INPATIENT PSYCHIATRY PROGRESS NOTE - NSTXDCFAMINTERMD_PSY_ALL_CORE
coordinate with s/w 

## 2025-06-12 NOTE — BH INPATIENT PSYCHIATRY PROGRESS NOTE - NSTXDISORGDATETRGT_PSY_ALL_CORE
08-Jun-2025
08-Jun-2025
15-Newton-2025
08-Jun-2025
08-Jun-2025
19-Jun-2025
08-Jun-2025

## 2025-06-13 VITALS — TEMPERATURE: 98 F

## 2025-06-13 RX ADMIN — Medication 0.5 MILLIGRAM(S): at 08:45

## 2025-06-13 RX ADMIN — HALOPERIDOL 2 MILLIGRAM(S): 10 TABLET ORAL at 08:45

## 2025-06-13 RX ADMIN — DORZOLAMIDE 1 DROP(S): 20 SOLUTION/ DROPS OPHTHALMIC at 09:11

## 2025-09-12 ENCOUNTER — EMERGENCY (EMERGENCY)
Facility: HOSPITAL | Age: 65
LOS: 1 days | End: 2025-09-12
Attending: EMERGENCY MEDICINE
Payer: MEDICARE

## 2025-09-12 VITALS
WEIGHT: 154.98 LBS | DIASTOLIC BLOOD PRESSURE: 80 MMHG | HEIGHT: 66 IN | OXYGEN SATURATION: 97 % | TEMPERATURE: 98 F | SYSTOLIC BLOOD PRESSURE: 138 MMHG | RESPIRATION RATE: 18 BRPM | HEART RATE: 90 BPM

## 2025-09-12 DIAGNOSIS — H26.9 UNSPECIFIED CATARACT: Chronic | ICD-10-CM

## 2025-09-12 LAB
ALBUMIN SERPL ELPH-MCNC: 3.4 G/DL — SIGNIFICANT CHANGE UP (ref 3.3–5.2)
ALP SERPL-CCNC: 59 U/L — SIGNIFICANT CHANGE UP (ref 40–120)
ALT FLD-CCNC: 5 U/L — SIGNIFICANT CHANGE UP
AMPHET UR-MCNC: NEGATIVE — SIGNIFICANT CHANGE UP
ANION GAP SERPL CALC-SCNC: 12 MMOL/L — SIGNIFICANT CHANGE UP (ref 5–17)
ANISOCYTOSIS BLD QL: SLIGHT — SIGNIFICANT CHANGE UP
APAP SERPL-MCNC: <3 UG/ML — LOW (ref 10–26)
APPEARANCE UR: CLEAR — SIGNIFICANT CHANGE UP
AST SERPL-CCNC: 15 U/L — SIGNIFICANT CHANGE UP
BARBITURATES UR SCN-MCNC: NEGATIVE — SIGNIFICANT CHANGE UP
BASOPHILS # BLD AUTO: 0.01 K/UL — SIGNIFICANT CHANGE UP (ref 0–0.2)
BASOPHILS # BLD MANUAL: 0.03 K/UL — SIGNIFICANT CHANGE UP (ref 0–0.2)
BASOPHILS NFR BLD AUTO: 0.4 % — SIGNIFICANT CHANGE UP (ref 0–2)
BASOPHILS NFR BLD MANUAL: 0.9 % — SIGNIFICANT CHANGE UP (ref 0–2)
BENZODIAZ UR-MCNC: NEGATIVE — SIGNIFICANT CHANGE UP
BILIRUB SERPL-MCNC: 0.5 MG/DL — SIGNIFICANT CHANGE UP (ref 0.4–2)
BILIRUB UR-MCNC: NEGATIVE — SIGNIFICANT CHANGE UP
BUN SERPL-MCNC: 22 MG/DL — HIGH (ref 8–20)
BURR CELLS BLD QL SMEAR: SLIGHT — SIGNIFICANT CHANGE UP
CALCIUM SERPL-MCNC: 9.2 MG/DL — SIGNIFICANT CHANGE UP (ref 8.4–10.5)
CHLORIDE SERPL-SCNC: 111 MMOL/L — HIGH (ref 96–108)
CO2 SERPL-SCNC: 22 MMOL/L — SIGNIFICANT CHANGE UP (ref 22–29)
COCAINE METAB.OTHER UR-MCNC: NEGATIVE — SIGNIFICANT CHANGE UP
COLOR SPEC: YELLOW — SIGNIFICANT CHANGE UP
CREAT SERPL-MCNC: 1.87 MG/DL — HIGH (ref 0.5–1.3)
DIFF PNL FLD: NEGATIVE — SIGNIFICANT CHANGE UP
EGFR: 30 ML/MIN/1.73M2 — LOW
EGFR: 30 ML/MIN/1.73M2 — LOW
EOSINOPHIL # BLD AUTO: 0.02 K/UL — SIGNIFICANT CHANGE UP (ref 0–0.5)
EOSINOPHIL # BLD MANUAL: 0.03 K/UL — SIGNIFICANT CHANGE UP (ref 0–0.5)
EOSINOPHIL NFR BLD AUTO: 0.7 % — SIGNIFICANT CHANGE UP (ref 0–6)
EOSINOPHIL NFR BLD MANUAL: 0.9 % — SIGNIFICANT CHANGE UP (ref 0–6)
ETHANOL SERPL-MCNC: <10 MG/DL — SIGNIFICANT CHANGE UP (ref 0–9)
FENTANYL UR QL SCN: NEGATIVE — SIGNIFICANT CHANGE UP
GIANT PLATELETS BLD QL SMEAR: PRESENT
GLUCOSE SERPL-MCNC: 60 MG/DL — LOW (ref 70–99)
GLUCOSE UR QL: NEGATIVE MG/DL — SIGNIFICANT CHANGE UP
HCT VFR BLD CALC: 32.4 % — LOW (ref 34.5–45)
HGB BLD-MCNC: 10.8 G/DL — LOW (ref 11.5–15.5)
HYPOCHROMIA BLD QL: SLIGHT — SIGNIFICANT CHANGE UP
IMM GRANULOCYTES # BLD AUTO: 0 K/UL — SIGNIFICANT CHANGE UP (ref 0–0.07)
IMM GRANULOCYTES NFR BLD AUTO: 0 % — SIGNIFICANT CHANGE UP (ref 0–0.9)
KETONES UR QL: NEGATIVE MG/DL — SIGNIFICANT CHANGE UP
LEUKOCYTE ESTERASE UR-ACNC: NEGATIVE — SIGNIFICANT CHANGE UP
LYMPHOCYTES # BLD AUTO: 0.64 K/UL — LOW (ref 1–3.3)
LYMPHOCYTES # BLD MANUAL: 0.27 K/UL — LOW (ref 1–3.3)
LYMPHOCYTES NFR BLD AUTO: 22.6 % — SIGNIFICANT CHANGE UP (ref 13–44)
LYMPHOCYTES NFR BLD MANUAL: 9.6 % — LOW (ref 13–44)
MANUAL NEUTROPHIL BANDS #: 0.05 K/UL — SIGNIFICANT CHANGE UP (ref 0–0.84)
MCHC RBC-ENTMCNC: 28.5 PG — SIGNIFICANT CHANGE UP (ref 27–34)
MCHC RBC-ENTMCNC: 33.3 G/DL — SIGNIFICANT CHANGE UP (ref 32–36)
MCV RBC AUTO: 85.5 FL — SIGNIFICANT CHANGE UP (ref 80–100)
METHADONE UR-MCNC: NEGATIVE — SIGNIFICANT CHANGE UP
MONOCYTES # BLD AUTO: 0.34 K/UL — SIGNIFICANT CHANGE UP (ref 0–0.9)
MONOCYTES # BLD MANUAL: 0.25 K/UL — SIGNIFICANT CHANGE UP (ref 0–0.9)
MONOCYTES NFR BLD AUTO: 12 % — SIGNIFICANT CHANGE UP (ref 2–14)
MONOCYTES NFR BLD MANUAL: 8.8 % — SIGNIFICANT CHANGE UP (ref 2–14)
NEUTROPHILS # BLD AUTO: 1.82 K/UL — SIGNIFICANT CHANGE UP (ref 1.8–7.4)
NEUTROPHILS # BLD MANUAL: 2.21 K/UL — SIGNIFICANT CHANGE UP (ref 1.8–7.4)
NEUTROPHILS NFR BLD AUTO: 64.3 % — SIGNIFICANT CHANGE UP (ref 43–77)
NEUTROPHILS NFR BLD MANUAL: 78 % — HIGH (ref 43–77)
NEUTS BAND # BLD: 1.8 % — SIGNIFICANT CHANGE UP (ref 0–8)
NEUTS BAND NFR BLD: 1.8 % — SIGNIFICANT CHANGE UP (ref 0–8)
NITRITE UR-MCNC: NEGATIVE — SIGNIFICANT CHANGE UP
NRBC # BLD AUTO: 0 K/UL — SIGNIFICANT CHANGE UP (ref 0–0)
NRBC # FLD: 0 K/UL — SIGNIFICANT CHANGE UP (ref 0–0)
NRBC BLD AUTO-RTO: 0 /100 WBCS — SIGNIFICANT CHANGE UP (ref 0–0)
OPIATES UR-MCNC: NEGATIVE — SIGNIFICANT CHANGE UP
OVALOCYTES BLD QL SMEAR: SLIGHT — SIGNIFICANT CHANGE UP
PCP SPEC-MCNC: SIGNIFICANT CHANGE UP
PCP UR-MCNC: NEGATIVE — SIGNIFICANT CHANGE UP
PH UR: 6.5 — SIGNIFICANT CHANGE UP (ref 5–8)
PLAT MORPH BLD: NORMAL — SIGNIFICANT CHANGE UP
PLATELET # BLD AUTO: 154 K/UL — SIGNIFICANT CHANGE UP (ref 150–400)
PMV BLD: 10.4 FL — SIGNIFICANT CHANGE UP (ref 7–13)
POIKILOCYTOSIS BLD QL AUTO: SLIGHT — SIGNIFICANT CHANGE UP
POTASSIUM SERPL-MCNC: 4 MMOL/L — SIGNIFICANT CHANGE UP (ref 3.5–5.3)
POTASSIUM SERPL-SCNC: 4 MMOL/L — SIGNIFICANT CHANGE UP (ref 3.5–5.3)
PROT SERPL-MCNC: 7.3 G/DL — SIGNIFICANT CHANGE UP (ref 6.6–8.7)
PROT UR-MCNC: SIGNIFICANT CHANGE UP MG/DL
RBC # BLD: 3.79 M/UL — LOW (ref 3.8–5.2)
RBC # FLD: 15.2 % — HIGH (ref 10.3–14.5)
RBC BLD AUTO: SIGNIFICANT CHANGE UP
SALICYLATES SERPL-MCNC: <0.6 MG/DL — LOW (ref 10–20)
SODIUM SERPL-SCNC: 145 MMOL/L — SIGNIFICANT CHANGE UP (ref 135–145)
SP GR SPEC: 1.01 — SIGNIFICANT CHANGE UP (ref 1–1.03)
THC UR QL: NEGATIVE — SIGNIFICANT CHANGE UP
UROBILINOGEN FLD QL: 0.2 MG/DL — SIGNIFICANT CHANGE UP (ref 0.2–1)
WBC # BLD: 2.83 K/UL — LOW (ref 3.8–10.5)
WBC # FLD AUTO: 2.83 K/UL — LOW (ref 3.8–10.5)

## 2025-09-12 PROCEDURE — 80307 DRUG TEST PRSMV CHEM ANLYZR: CPT

## 2025-09-12 PROCEDURE — 93010 ELECTROCARDIOGRAM REPORT: CPT

## 2025-09-12 PROCEDURE — 99285 EMERGENCY DEPT VISIT HI MDM: CPT

## 2025-09-12 PROCEDURE — 85025 COMPLETE CBC W/AUTO DIFF WBC: CPT

## 2025-09-12 PROCEDURE — 93005 ELECTROCARDIOGRAM TRACING: CPT

## 2025-09-12 PROCEDURE — 36415 COLL VENOUS BLD VENIPUNCTURE: CPT

## 2025-09-12 PROCEDURE — 90792 PSYCH DIAG EVAL W/MED SRVCS: CPT

## 2025-09-12 PROCEDURE — 81003 URINALYSIS AUTO W/O SCOPE: CPT

## 2025-09-12 PROCEDURE — 80053 COMPREHEN METABOLIC PANEL: CPT

## 2025-09-12 RX ORDER — MELATONIN 5 MG
5 TABLET ORAL AT BEDTIME
Refills: 0 | Status: ACTIVE | OUTPATIENT
Start: 2025-09-12 | End: 2026-08-11

## 2025-09-12 RX ORDER — ACETAZOLAMIDE 250 MG/1
250 TABLET ORAL
Refills: 0 | Status: ACTIVE | OUTPATIENT
Start: 2025-09-12 | End: 2026-08-11

## 2025-09-12 RX ORDER — AMLODIPINE BESYLATE 10 MG/1
10 TABLET ORAL DAILY
Refills: 0 | Status: ACTIVE | OUTPATIENT
Start: 2025-09-12 | End: 2026-08-11

## 2025-09-12 RX ORDER — HALOPERIDOL 10 MG/1
5 TABLET ORAL EVERY 6 HOURS
Refills: 0 | Status: ACTIVE | OUTPATIENT
Start: 2025-09-12 | End: 2026-08-11

## 2025-09-12 RX ORDER — LOSARTAN POTASSIUM 100 MG/1
100 TABLET, FILM COATED ORAL DAILY
Refills: 0 | Status: ACTIVE | OUTPATIENT
Start: 2025-09-12 | End: 2026-08-11

## 2025-09-12 RX ORDER — LORAZEPAM 4 MG/ML
2 VIAL (ML) INJECTION ONCE
Refills: 0 | Status: DISCONTINUED | OUTPATIENT
Start: 2025-09-12 | End: 2025-09-12

## 2025-09-12 RX ORDER — DIPHENHYDRAMINE HCL 12.5MG/5ML
50 ELIXIR ORAL ONCE
Refills: 0 | Status: COMPLETED | OUTPATIENT
Start: 2025-09-12 | End: 2025-09-12

## 2025-09-12 RX ORDER — DIPHENHYDRAMINE HCL 12.5MG/5ML
50 ELIXIR ORAL EVERY 6 HOURS
Refills: 0 | Status: ACTIVE | OUTPATIENT
Start: 2025-09-12 | End: 2026-08-11

## 2025-09-12 RX ORDER — HALOPERIDOL 10 MG/1
5 TABLET ORAL ONCE
Refills: 0 | Status: COMPLETED | OUTPATIENT
Start: 2025-09-12 | End: 2025-09-12

## 2025-09-12 RX ADMIN — ACETAZOLAMIDE 250 MILLIGRAM(S): 250 TABLET ORAL at 21:20

## 2025-09-12 RX ADMIN — AMLODIPINE BESYLATE 10 MILLIGRAM(S): 10 TABLET ORAL at 21:21

## 2025-09-12 RX ADMIN — HALOPERIDOL 5 MILLIGRAM(S): 10 TABLET ORAL at 15:43

## 2025-09-12 RX ADMIN — Medication 2 MILLIGRAM(S): at 15:43

## 2025-09-12 RX ADMIN — LOSARTAN POTASSIUM 100 MILLIGRAM(S): 100 TABLET, FILM COATED ORAL at 21:20

## 2025-09-12 RX ADMIN — Medication 50 MILLIGRAM(S): at 15:43

## 2025-09-13 VITALS
OXYGEN SATURATION: 99 % | HEART RATE: 60 BPM | RESPIRATION RATE: 20 BRPM | TEMPERATURE: 98 F | SYSTOLIC BLOOD PRESSURE: 144 MMHG | DIASTOLIC BLOOD PRESSURE: 65 MMHG

## 2025-09-13 LAB
ANION GAP SERPL CALC-SCNC: 12 MMOL/L — SIGNIFICANT CHANGE UP (ref 5–17)
BUN SERPL-MCNC: 20.1 MG/DL — HIGH (ref 8–20)
CALCIUM SERPL-MCNC: 9.1 MG/DL — SIGNIFICANT CHANGE UP (ref 8.4–10.5)
CHLORIDE SERPL-SCNC: 108 MMOL/L — SIGNIFICANT CHANGE UP (ref 96–108)
CO2 SERPL-SCNC: 20 MMOL/L — LOW (ref 22–29)
CREAT SERPL-MCNC: 1.75 MG/DL — HIGH (ref 0.5–1.3)
EGFR: 32 ML/MIN/1.73M2 — LOW
EGFR: 32 ML/MIN/1.73M2 — LOW
GLUCOSE SERPL-MCNC: 75 MG/DL — SIGNIFICANT CHANGE UP (ref 70–99)
POTASSIUM SERPL-MCNC: 3.9 MMOL/L — SIGNIFICANT CHANGE UP (ref 3.5–5.3)
POTASSIUM SERPL-SCNC: 3.9 MMOL/L — SIGNIFICANT CHANGE UP (ref 3.5–5.3)
SODIUM SERPL-SCNC: 140 MMOL/L — SIGNIFICANT CHANGE UP (ref 135–145)

## 2025-09-13 PROCEDURE — 80053 COMPREHEN METABOLIC PANEL: CPT

## 2025-09-13 PROCEDURE — 99223 1ST HOSP IP/OBS HIGH 75: CPT

## 2025-09-13 PROCEDURE — G0378: CPT

## 2025-09-13 PROCEDURE — 99232 SBSQ HOSP IP/OBS MODERATE 35: CPT

## 2025-09-13 PROCEDURE — 36415 COLL VENOUS BLD VENIPUNCTURE: CPT

## 2025-09-13 PROCEDURE — 81003 URINALYSIS AUTO W/O SCOPE: CPT

## 2025-09-13 PROCEDURE — 99285 EMERGENCY DEPT VISIT HI MDM: CPT | Mod: 25

## 2025-09-13 PROCEDURE — 93005 ELECTROCARDIOGRAM TRACING: CPT

## 2025-09-13 PROCEDURE — 85025 COMPLETE CBC W/AUTO DIFF WBC: CPT

## 2025-09-13 PROCEDURE — 80048 BASIC METABOLIC PNL TOTAL CA: CPT

## 2025-09-13 PROCEDURE — 80307 DRUG TEST PRSMV CHEM ANLYZR: CPT

## 2025-09-13 PROCEDURE — 96372 THER/PROPH/DIAG INJ SC/IM: CPT

## 2025-09-13 RX ORDER — HALOPERIDOL 10 MG/1
2 TABLET ORAL
Refills: 0 | Status: ACTIVE | OUTPATIENT
Start: 2025-09-13 | End: 2026-08-12

## 2025-09-13 RX ORDER — HALOPERIDOL 10 MG/1
1 TABLET ORAL
Refills: 0 | Status: DISCONTINUED | OUTPATIENT
Start: 2025-09-13 | End: 2025-09-13

## 2025-09-13 RX ADMIN — HALOPERIDOL 2 MILLIGRAM(S): 10 TABLET ORAL at 10:48

## 2025-09-13 RX ADMIN — ACETAZOLAMIDE 250 MILLIGRAM(S): 250 TABLET ORAL at 05:33
